# Patient Record
Sex: FEMALE | Race: WHITE | NOT HISPANIC OR LATINO | Employment: FULL TIME | URBAN - METROPOLITAN AREA
[De-identification: names, ages, dates, MRNs, and addresses within clinical notes are randomized per-mention and may not be internally consistent; named-entity substitution may affect disease eponyms.]

---

## 2017-01-05 RX ORDER — ERGOCALCIFEROL 1.25 MG/1
50000 CAPSULE ORAL
Qty: 4 CAPSULE | Refills: 2 | Status: SHIPPED | OUTPATIENT
Start: 2017-01-05 | End: 2017-06-11 | Stop reason: SDUPTHER

## 2017-01-10 ENCOUNTER — TELEPHONE (OUTPATIENT)
Dept: ENDOCRINOLOGY | Facility: CLINIC | Age: 47
End: 2017-01-10

## 2017-01-10 NOTE — TELEPHONE ENCOUNTER
----- Message from Rashmi Sheppard sent at 1/10/2017  9:22 AM EST -----  Contact: [facility]  THEY ARE CALLING TO CHECK ON THE STATUS OF A PRIOR AUTH FOR TOUJEO MEDICATION FOR THIS PATIENT. THEY FAXED THE ORIGINAL REQUEST FOR PRIOR AUTH ON 01/04/2017. I ASKED THEM IF THEY WOULD REFAX THAT JUST IN CASE WE DID NOT RECEIVE IT THE FIRST TIME. SHE WANTS US TO FAX THE PRIOR AUTH ONCE WE RECEIVE IT.

## 2017-01-10 NOTE — TELEPHONE ENCOUNTER
Called pharmacy informed them we haven't received the rejection from them asked them to refax the rejection so that PA can be started.

## 2017-01-18 ENCOUNTER — TELEPHONE (OUTPATIENT)
Dept: ENDOCRINOLOGY | Facility: CLINIC | Age: 47
End: 2017-01-18

## 2017-01-18 NOTE — TELEPHONE ENCOUNTER
----- Message from Mackenzie Elder sent at 1/18/2017  9:49 AM EST -----  Contact: [facility] in [place] called to check on PA status for toujeo  [phone]

## 2017-01-19 ENCOUNTER — TELEPHONE (OUTPATIENT)
Dept: ENDOCRINOLOGY | Facility: CLINIC | Age: 47
End: 2017-01-19

## 2017-01-19 DIAGNOSIS — E11.8 UNCONTROLLED TYPE 2 DIABETES MELLITUS WITH COMPLICATION, UNSPECIFIED LONG TERM INSULIN USE STATUS: Primary | ICD-10-CM

## 2017-01-19 DIAGNOSIS — E11.8 UNCONTROLLED TYPE 2 DIABETES MELLITUS WITH COMPLICATION, UNSPECIFIED LONG TERM INSULIN USE STATUS: ICD-10-CM

## 2017-01-19 DIAGNOSIS — E11.65 UNCONTROLLED TYPE 2 DIABETES MELLITUS WITH COMPLICATION, UNSPECIFIED LONG TERM INSULIN USE STATUS: ICD-10-CM

## 2017-01-19 DIAGNOSIS — E11.65 UNCONTROLLED TYPE 2 DIABETES MELLITUS WITH COMPLICATION, UNSPECIFIED LONG TERM INSULIN USE STATUS: Primary | ICD-10-CM

## 2017-01-19 NOTE — TELEPHONE ENCOUNTER
Returned pt's call and informed her that her Rx for Toujeo was switched to Basaglar per  considering that this was the only one that was covered under her insurance. Pt expressed understanding.

## 2017-01-19 NOTE — TELEPHONE ENCOUNTER
----- Message from Trish Rubio sent at 1/19/2017  9:05 AM EST -----  THE PATIENT WAS RETURNING YOUR CALL AND WOULD LIKE FOR YOU TO GIVE HER A CALL B K

## 2017-01-23 ENCOUNTER — TELEPHONE (OUTPATIENT)
Dept: ENDOCRINOLOGY | Facility: CLINIC | Age: 47
End: 2017-01-23

## 2017-01-23 DIAGNOSIS — E11.8 UNCONTROLLED TYPE 2 DIABETES MELLITUS WITH COMPLICATION, UNSPECIFIED LONG TERM INSULIN USE STATUS: ICD-10-CM

## 2017-01-23 DIAGNOSIS — E11.65 UNCONTROLLED TYPE 2 DIABETES MELLITUS WITH COMPLICATION, UNSPECIFIED LONG TERM INSULIN USE STATUS: ICD-10-CM

## 2017-01-23 NOTE — TELEPHONE ENCOUNTER
----- Message from Rashmi Sheppard sent at 1/23/2017  3:39 PM EST -----  Contact: PATIENT  PATIENT IS CALLING ABOUT HER RECENT REFILL ON THE BASAGLAR SCRIPT THAT HAS BEEN CALLED INTO HER PHARMACY. ITS BEING FILLED WRONG. SHE SAID SHE TAKES 50 UNITS AT NIGHT NOT 10 UNITS AT NIGHT. SHE WOULD LIKE FOR US TO FIX THIS ERROR ON THIS MEDICATION. PHARMACY [facility] IN [place] PHONE NUMBER [phone]. SHE NEEDS IT FOR 90 DAYS AS ITS CHEAPER THROUGH INSURANCE TO HAVE IT FILLED THIS WAY. YOU CAN REACH PATIENT [phone]

## 2017-02-17 DIAGNOSIS — E03.9 ACQUIRED HYPOTHYROIDISM: ICD-10-CM

## 2017-02-20 ENCOUNTER — TELEPHONE (OUTPATIENT)
Dept: ENDOCRINOLOGY | Facility: CLINIC | Age: 47
End: 2017-02-20

## 2017-02-20 RX ORDER — LEVOTHYROXINE SODIUM 300 UG/1
TABLET ORAL
Qty: 90 TABLET | Refills: 2 | Status: SHIPPED | OUTPATIENT
Start: 2017-02-20 | End: 2017-03-14 | Stop reason: ALTCHOICE

## 2017-02-20 NOTE — TELEPHONE ENCOUNTER
Please see PT request.         ---- Message from Maliha Dale sent at 2/20/2017 12:36 PM EST -----  Contact: PATIENT  PATIENT STATES HER FEET ARE HURTING A LOT AND SHE FEELS LIKE SHE NEEDS TO BE ON MEDICATION.SHE ISN'T SURE IF SHE SPOKE TO YOU ABOUT THIS AT HER LAST VISIT. SHE DOES SEE YOU IN 2 WEEKS BUT CANT WAIT TILL THEN.

## 2017-02-21 RX ORDER — GABAPENTIN 300 MG/1
300 CAPSULE ORAL 3 TIMES DAILY
Qty: 90 CAPSULE | Refills: 3 | Status: SHIPPED | OUTPATIENT
Start: 2017-02-21 | End: 2017-09-12 | Stop reason: SDUPTHER

## 2017-02-21 NOTE — TELEPHONE ENCOUNTER
Her symptoms sounds like diabetic neuropathy symptoms. We can start her on the medication and will assess jn sin 2 weeks at her visit, Please send or call in rx fro Neurontin 300 mg 3 times a day. (90 tabs in 3 refils) . she may start at twice a day for the first 2 weeks

## 2017-03-08 ENCOUNTER — OFFICE VISIT (OUTPATIENT)
Dept: ENDOCRINOLOGY | Facility: CLINIC | Age: 47
End: 2017-03-08

## 2017-03-08 VITALS
DIASTOLIC BLOOD PRESSURE: 60 MMHG | BODY MASS INDEX: 39.78 KG/M2 | WEIGHT: 233 LBS | HEIGHT: 64 IN | SYSTOLIC BLOOD PRESSURE: 118 MMHG

## 2017-03-08 DIAGNOSIS — E11.42 TYPE 2 DIABETES MELLITUS WITH DIABETIC POLYNEUROPATHY, WITH LONG-TERM CURRENT USE OF INSULIN (HCC): ICD-10-CM

## 2017-03-08 DIAGNOSIS — E11.8 UNCONTROLLED TYPE 2 DIABETES MELLITUS WITH COMPLICATION, UNSPECIFIED LONG TERM INSULIN USE STATUS: Primary | ICD-10-CM

## 2017-03-08 DIAGNOSIS — E11.65 UNCONTROLLED TYPE 2 DIABETES MELLITUS WITH COMPLICATION, UNSPECIFIED LONG TERM INSULIN USE STATUS: Primary | ICD-10-CM

## 2017-03-08 DIAGNOSIS — E03.9 ACQUIRED HYPOTHYROIDISM: ICD-10-CM

## 2017-03-08 DIAGNOSIS — Z79.4 TYPE 2 DIABETES MELLITUS WITH DIABETIC POLYNEUROPATHY, WITH LONG-TERM CURRENT USE OF INSULIN (HCC): ICD-10-CM

## 2017-03-08 DIAGNOSIS — E04.2 NONTOXIC MULTINODULAR GOITER: ICD-10-CM

## 2017-03-08 DIAGNOSIS — E55.9 VITAMIN D DEFICIENCY: ICD-10-CM

## 2017-03-08 LAB
ALBUMIN SERPL-MCNC: 4.4 G/DL (ref 3.2–4.8)
ALBUMIN/GLOB SERPL: 1.6 G/DL (ref 1.5–2.5)
ALP SERPL-CCNC: 52 U/L (ref 25–100)
ALT SERPL W P-5'-P-CCNC: 17 U/L (ref 7–40)
ANION GAP SERPL CALCULATED.3IONS-SCNC: 9 MMOL/L (ref 3–11)
AST SERPL-CCNC: 16 U/L (ref 0–33)
BILIRUB SERPL-MCNC: 0.3 MG/DL (ref 0.3–1.2)
BUN BLD-MCNC: 20 MG/DL (ref 9–23)
BUN/CREAT SERPL: 28.6 (ref 7–25)
CALCIUM SPEC-SCNC: 10 MG/DL (ref 8.7–10.4)
CHLORIDE SERPL-SCNC: 99 MMOL/L (ref 99–109)
CO2 SERPL-SCNC: 26 MMOL/L (ref 20–31)
CREAT BLD-MCNC: 0.7 MG/DL (ref 0.6–1.3)
GFR SERPL CREATININE-BSD FRML MDRD: 90 ML/MIN/1.73
GLOBULIN UR ELPH-MCNC: 2.8 GM/DL
GLUCOSE BLD-MCNC: 201 MG/DL (ref 70–100)
GLUCOSE BLDC GLUCOMTR-MCNC: 252 MG/DL (ref 70–130)
HBA1C MFR BLD: 10.5 %
POTASSIUM BLD-SCNC: 4.4 MMOL/L (ref 3.5–5.5)
PROT SERPL-MCNC: 7.2 G/DL (ref 5.7–8.2)
SODIUM BLD-SCNC: 134 MMOL/L (ref 132–146)
T4 FREE SERPL-MCNC: 1.93 NG/DL (ref 0.89–1.76)
TSH SERPL DL<=0.05 MIU/L-ACNC: 0.24 MIU/ML (ref 0.35–5.35)

## 2017-03-08 PROCEDURE — 80053 COMPREHEN METABOLIC PANEL: CPT | Performed by: INTERNAL MEDICINE

## 2017-03-08 PROCEDURE — 84439 ASSAY OF FREE THYROXINE: CPT | Performed by: INTERNAL MEDICINE

## 2017-03-08 PROCEDURE — 84443 ASSAY THYROID STIM HORMONE: CPT | Performed by: INTERNAL MEDICINE

## 2017-03-08 PROCEDURE — 83036 HEMOGLOBIN GLYCOSYLATED A1C: CPT | Performed by: INTERNAL MEDICINE

## 2017-03-08 PROCEDURE — 82947 ASSAY GLUCOSE BLOOD QUANT: CPT | Performed by: INTERNAL MEDICINE

## 2017-03-08 PROCEDURE — 99214 OFFICE O/P EST MOD 30 MIN: CPT | Performed by: INTERNAL MEDICINE

## 2017-03-08 RX ORDER — MONTELUKAST SODIUM 4 MG/1
1 TABLET, CHEWABLE ORAL 2 TIMES DAILY
Refills: 3 | COMMUNITY
Start: 2017-02-06 | End: 2019-01-29

## 2017-03-08 NOTE — PROGRESS NOTES
Chief complaint  Follow-up (Type 2 diabetes mellitus, hypothyroidism)    Subjective   Ann Galdamez is a 46 y.o. female is here today for follow-up.  Follow-up for DM type 2 dx 2012.  Complications: noticed burning on the top of the feet, tingling at night. Pain is severe and she called the office, I have started her on neurontin 300 mg TID 2 weeks ago.   She started medication and tingling improved, pain is still persistent. It interferes with her sleep and she feels tired. N oside effects observed.   I have started patient on basal insulin Lantus  50 units a day. Insulin titration instructions and insulin administration teaching was provided to the patient. She titrated up to 50 and stopped because felt that the dose is too high     Due for eye exam.   Medications:  metformin 500 mg ER daily. Victoza 1.8 mg Lantus    Past meds: Invokana in the past caused yeast infection and it was stopped.   Monitoring: glucose is 240- 300, more variability during    Hypothyroidism dx 2006.In July 2014 [place] levothyroxine combo was transitioned to levothyroxine, the dose increased to 350 mcg last visit. Doing well and compliant with the medication.        Diarrhea is resolved with cholestipol. Doing better with this. Had colonoscopy.     Thyroid nodules are stable.     She had abnormal PAPsmear precancerous lesions and scheduled for the follow-up visit. Scheduled for procedure.       Hypothyroidism   Associated symptoms include arthralgias and numbness (sensitive top of the feet bilaterally, constant pain worse at night. ). Pertinent negatives include no abdominal pain, chills, congestion, coughing, diaphoresis, joint swelling, myalgias, nausea, neck pain, sore throat or vomiting.       Medications    Current Outpatient Prescriptions:   •  Blood Glucose Monitoring Suppl (ONE TOUCH ULTRA MINI) W/DEVICE kit, Test 3 times a day, Disp: , Rfl:   •  colestipol (COLESTID) 1 G tablet, Take 1 tablet by mouth 2 (Two) Times a Day.,  Disp: , Rfl: 3  •  CRANBERRY PO, Take 1 capsule by mouth daily., Disp: , Rfl:   •  cyanocobalamin 1000 MCG/ML injection, Inject 1 mL under the skin every 28 (twenty-eight) days. Provide 3 vials - 90 days supply, Disp: 3 mL, Rfl: 3  •  gabapentin (NEURONTIN) 300 MG capsule, Take 1 capsule by mouth 3 (Three) Times a Day. TAKE 2 CAPSULES DAILY FOR THE FIRST TWO WEEKS, Disp: 90 capsule, Rfl: 3  •  glucose blood (ONE TOUCH ULTRA TEST) test strip, Test 3 times daily, Disp: , Rfl:   •  Insulin Glargine (BASAGLAR KWIKPEN) 100 UNIT/ML injection pen, Inject 50 Units under the skin Every Night., Disp: 50 mL, Rfl: 1  •  levothyroxine (SYNTHROID, LEVOTHROID) 300 MCG tablet, TAKE 1 TABLET EVERY DAY, Disp: 90 tablet, Rfl: 2  •  Liraglutide (VICTOZA) 18 MG/3ML solution pen-injector, Inject 1.8 mg under the skin Daily., Disp: 3 pen, Rfl: 5  •  medroxyPROGESTERone (PROVERA) 10 MG tablet, Take 10 mg by mouth daily., Disp: , Rfl:   •  metFORMIN XR (GLUCOPHAGE-XR) 500 MG 24 hr tablet, TAKE 1 TABLET EVERY DAY AS DIRECTED, Disp: 30 tablet, Rfl: 5  •  ONETOUCH DELICA LANCETS 33G misc, Test 3 times daily, Disp: , Rfl:   •  valsartan-hydrochlorothiazide (DIOVAN-HCT) 160-12.5 MG per tablet, TAKE 1 TABLET EVERY DAY, Disp: 90 tablet, Rfl: 1  •  vitamin D (ERGOCALCIFEROL) 05807 UNITS capsule capsule, Take 1 capsule by mouth Every 7 (Seven) Days., Disp: 4 capsule, Rfl: 2  •  Insulin Pen Needle (B-D UF III MINI PEN NEEDLES) 31G X 5 MM misc, 1 each 2 (Two) Times a Day., Disp: 60 each, Rfl: 5    PMH  The following portions of the patient's history were reviewed and updated as appropriate: allergies, current medications, past family history, past medical history, past social history, past surgical history and problem list.    Review of systems  Review of Systems   Constitutional: Positive for unexpected weight change (weight gain). Negative for activity change, appetite change, chills and diaphoresis.   HENT: Negative for congestion, ear pain, facial  "swelling, hearing loss, postnasal drip, sore throat, trouble swallowing and voice change.    Eyes: Negative.  Negative for redness, itching and visual disturbance.   Respiratory: Negative for cough and shortness of breath.    Cardiovascular: Negative for palpitations and leg swelling.   Gastrointestinal: Positive for diarrhea. Negative for abdominal distention, abdominal pain, constipation, nausea and vomiting.   Endocrine:        As listed in HPI   Genitourinary: Negative.    Musculoskeletal: Positive for arthralgias. Negative for back pain, joint swelling, myalgias, neck pain and neck stiffness.   Skin: Negative.    Allergic/Immunologic: Negative.    Neurological: Positive for numbness (sensitive top of the feet bilaterally, constant pain worse at night. ). Negative for syncope and light-headedness.   Hematological: Negative.    Psychiatric/Behavioral: Negative.    All other systems reviewed and are negative.      Physical exam  Objective   Blood pressure 118/60, height 64\" (162.6 cm), weight 233 lb (106 kg).   Physical Exam   Constitutional: She is oriented to person, place, and time. She appears well-developed and well-nourished.   HENT:   Head: Normocephalic and atraumatic.   Eyes: Conjunctivae are normal.   Neck: No thyromegaly present.   The neck is supple and symmetric   Cardiovascular: Normal rate, regular rhythm and normal heart sounds.    Pulmonary/Chest: Effort normal and breath sounds normal.   Musculoskeletal: She exhibits no edema.   Lymphadenopathy:     She has no cervical adenopathy.   Neurological: She is alert and oriented to person, place, and time.   Skin: Skin is warm and dry. No rash noted.   Psychiatric: She has a normal mood and affect. Thought content normal.   Vitals reviewed.          Results for orders placed or performed in visit on 03/08/17   POC Glucose Fingerstick   Result Value Ref Range    Glucose 252 (A) 70 - 130 mg/dL   POC Glycosylated Hemoglobin (Hb A1C)   Result Value Ref " Range    Hemoglobin A1C 10.5 %     Lab Results   Component Value Date    HGBA1C 10.5 03/08/2017    HGBA1C 10.9 11/04/2016    HGBA1C 10.60 (H) 08/23/2016     Lab Results   Component Value Date    CREATININE 0.80 11/04/2016         Assessment  Assessment/Plan   Problem List Items Addressed This Visit        Digestive    Vitamin D deficiency       Endocrine    Hypothyroidism    Relevant Orders    TSH    T4, Free    Nontoxic multinodular goiter    Type 2 diabetes mellitus, uncontrolled - Primary    Relevant Orders    POC Glucose Fingerstick (Completed)    POC Glycosylated Hemoglobin (Hb A1C) (Completed)    Comprehensive Metabolic Panel       Other    Diabetes mellitus with diabetic polyneuropathy, with long-term current use of insulin          Plan  Levothyroxine  300 mcg/day. Repeat labs today      cont metformin and Victoza  1.8 mg daily, explained that no association with cervical cancer or precancer.    - neurontin 300 mg ER given for BID dosing and  incresae to 300 TID if not effective      Goals of glucose reviewed. Diabetes diet and complication prevention reviewed in details.     Thyroid u/s 4/23/15 showed stable nodules. Monitor every 2 years, due in 2017. Next visit.     Follow-up in 2-3 months

## 2017-03-14 ENCOUNTER — TELEPHONE (OUTPATIENT)
Dept: ENDOCRINOLOGY | Facility: CLINIC | Age: 47
End: 2017-03-14

## 2017-03-14 RX ORDER — LEVOTHYROXINE SODIUM 0.2 MG/1
200 TABLET ORAL DAILY
Qty: 30 TABLET | Refills: 5 | Status: SHIPPED | OUTPATIENT
Start: 2017-03-14 | End: 2017-08-17 | Stop reason: SDUPTHER

## 2017-03-14 RX ORDER — LEVOTHYROXINE SODIUM 0.07 MG/1
75 TABLET ORAL DAILY
Qty: 30 TABLET | Refills: 5 | Status: SHIPPED | OUTPATIENT
Start: 2017-03-14 | End: 2017-08-17 | Stop reason: SDUPTHER

## 2017-03-14 NOTE — TELEPHONE ENCOUNTER
Pt informed of results and expressed understanding,She stated that she was currently taking Levothyroxine 300 mcg so per , Rx was sent to pharmacy for Levothyroxine 200 mcg and 75 mcg.

## 2017-03-14 NOTE — TELEPHONE ENCOUNTER
----- Message from Bree GARCIA MD sent at 3/13/2017  9:41 PM EDT -----  Please call the patient regarding her lab results. THyroid function is too high and we need to reduce the dose. Please ask what dose is she currently taking - 300 mcg? If it si correct, then reduce the dose to 275 mcg daily - send rx for 200 and 75 mcg levothyroxine to take together daily. Other labs are normal, including electrolytes and renal function, liver enzymes.

## 2017-03-31 RX ORDER — LIRAGLUTIDE 6 MG/ML
INJECTION SUBCUTANEOUS
Qty: 2 PEN | Refills: 4 | Status: SHIPPED | OUTPATIENT
Start: 2017-03-31 | End: 2018-03-26 | Stop reason: SDUPTHER

## 2017-04-10 RX ORDER — METFORMIN HYDROCHLORIDE 500 MG/1
500 TABLET, EXTENDED RELEASE ORAL DAILY
Qty: 90 TABLET | Refills: 0 | Status: SHIPPED | OUTPATIENT
Start: 2017-04-10 | End: 2017-04-26 | Stop reason: SDUPTHER

## 2017-04-21 DIAGNOSIS — E11.65 UNCONTROLLED TYPE 2 DIABETES MELLITUS WITH COMPLICATION, UNSPECIFIED LONG TERM INSULIN USE STATUS: ICD-10-CM

## 2017-04-21 DIAGNOSIS — E11.8 UNCONTROLLED TYPE 2 DIABETES MELLITUS WITH COMPLICATION, UNSPECIFIED LONG TERM INSULIN USE STATUS: ICD-10-CM

## 2017-04-27 RX ORDER — METFORMIN HYDROCHLORIDE 500 MG/1
500 TABLET, EXTENDED RELEASE ORAL DAILY
Qty: 90 TABLET | Refills: 0 | Status: SHIPPED | OUTPATIENT
Start: 2017-04-27 | End: 2017-08-17 | Stop reason: SDUPTHER

## 2017-05-15 RX ORDER — VALSARTAN AND HYDROCHLOROTHIAZIDE 160; 12.5 MG/1; MG/1
TABLET, FILM COATED ORAL
Qty: 90 TABLET | Refills: 1 | Status: SHIPPED | OUTPATIENT
Start: 2017-05-15 | End: 2017-11-10 | Stop reason: SDUPTHER

## 2017-06-08 ENCOUNTER — OFFICE VISIT (OUTPATIENT)
Dept: ENDOCRINOLOGY | Facility: CLINIC | Age: 47
End: 2017-06-08

## 2017-06-08 VITALS
OXYGEN SATURATION: 98 % | SYSTOLIC BLOOD PRESSURE: 136 MMHG | DIASTOLIC BLOOD PRESSURE: 78 MMHG | HEIGHT: 64 IN | BODY MASS INDEX: 40.26 KG/M2 | HEART RATE: 80 BPM | WEIGHT: 235.8 LBS

## 2017-06-08 DIAGNOSIS — E04.2 NONTOXIC MULTINODULAR GOITER: ICD-10-CM

## 2017-06-08 DIAGNOSIS — E11.65 UNCONTROLLED TYPE 2 DIABETES MELLITUS WITH COMPLICATION, UNSPECIFIED LONG TERM INSULIN USE STATUS: Primary | ICD-10-CM

## 2017-06-08 DIAGNOSIS — E03.9 ACQUIRED HYPOTHYROIDISM: ICD-10-CM

## 2017-06-08 DIAGNOSIS — E11.8 UNCONTROLLED TYPE 2 DIABETES MELLITUS WITH COMPLICATION, UNSPECIFIED LONG TERM INSULIN USE STATUS: Primary | ICD-10-CM

## 2017-06-08 LAB
ALBUMIN SERPL-MCNC: 4.6 G/DL (ref 3.2–4.8)
ALBUMIN/GLOB SERPL: 1.5 G/DL (ref 1.5–2.5)
ALP SERPL-CCNC: 46 U/L (ref 25–100)
ALT SERPL W P-5'-P-CCNC: 22 U/L (ref 7–40)
ANION GAP SERPL CALCULATED.3IONS-SCNC: 5 MMOL/L (ref 3–11)
ARTICHOKE IGE QN: 83 MG/DL (ref 0–130)
AST SERPL-CCNC: 17 U/L (ref 0–33)
BILIRUB SERPL-MCNC: 0.4 MG/DL (ref 0.3–1.2)
BUN BLD-MCNC: 17 MG/DL (ref 9–23)
BUN/CREAT SERPL: 24.3 (ref 7–25)
CALCIUM SPEC-SCNC: 10.3 MG/DL (ref 8.7–10.4)
CHLORIDE SERPL-SCNC: 103 MMOL/L (ref 99–109)
CHOLEST SERPL-MCNC: 172 MG/DL (ref 0–200)
CO2 SERPL-SCNC: 29 MMOL/L (ref 20–31)
CREAT BLD-MCNC: 0.7 MG/DL (ref 0.6–1.3)
GFR SERPL CREATININE-BSD FRML MDRD: 90 ML/MIN/1.73
GLOBULIN UR ELPH-MCNC: 3 GM/DL
GLUCOSE BLD-MCNC: 156 MG/DL (ref 70–100)
GLUCOSE BLDC GLUCOMTR-MCNC: 198 MG/DL (ref 70–130)
HBA1C MFR BLD: 9.7 %
HDLC SERPL-MCNC: 43 MG/DL (ref 40–60)
POTASSIUM BLD-SCNC: 4.4 MMOL/L (ref 3.5–5.5)
PROT SERPL-MCNC: 7.6 G/DL (ref 5.7–8.2)
SODIUM BLD-SCNC: 137 MMOL/L (ref 132–146)
T4 FREE SERPL-MCNC: 1.45 NG/DL (ref 0.89–1.76)
TRIGL SERPL-MCNC: 413 MG/DL (ref 0–150)
TSH SERPL DL<=0.05 MIU/L-ACNC: 0.84 MIU/ML (ref 0.35–5.35)

## 2017-06-08 PROCEDURE — 82570 ASSAY OF URINE CREATININE: CPT | Performed by: INTERNAL MEDICINE

## 2017-06-08 PROCEDURE — 84443 ASSAY THYROID STIM HORMONE: CPT | Performed by: INTERNAL MEDICINE

## 2017-06-08 PROCEDURE — 82947 ASSAY GLUCOSE BLOOD QUANT: CPT | Performed by: INTERNAL MEDICINE

## 2017-06-08 PROCEDURE — 99214 OFFICE O/P EST MOD 30 MIN: CPT | Performed by: INTERNAL MEDICINE

## 2017-06-08 PROCEDURE — 82043 UR ALBUMIN QUANTITATIVE: CPT | Performed by: INTERNAL MEDICINE

## 2017-06-08 PROCEDURE — 80053 COMPREHEN METABOLIC PANEL: CPT | Performed by: INTERNAL MEDICINE

## 2017-06-08 PROCEDURE — 84439 ASSAY OF FREE THYROXINE: CPT | Performed by: INTERNAL MEDICINE

## 2017-06-08 PROCEDURE — 76536 US EXAM OF HEAD AND NECK: CPT | Performed by: INTERNAL MEDICINE

## 2017-06-08 PROCEDURE — 80061 LIPID PANEL: CPT | Performed by: INTERNAL MEDICINE

## 2017-06-08 PROCEDURE — 83036 HEMOGLOBIN GLYCOSYLATED A1C: CPT | Performed by: INTERNAL MEDICINE

## 2017-06-08 NOTE — PATIENT INSTRUCTIONS
1. Continue Victoza 1.8 mg a day  Take Tresiba instead of Toujeo 100 units daily       2. Start injection with meal insulin  (Apidra) 10 units with largest meal (dinner).       3. Test glucose in the morning and at bedtime. If your bedtime, after meal glucose is > 200 in 1 week, increase Apidra to 15 Units

## 2017-06-08 NOTE — PROGRESS NOTES
Chief complaint  Hypothyroidism (F/u for type 2 diabetes and hypothyroidism, no sx or concerns. ) and Diabetes    Subjective   Ann Galdamez is a 46 y.o. female is here today for follow-up.  Follow-up for DM type 2 dx 2012.  Complications: noticed burning on the top of the feet, tingling at night.  on neurontin 300 mg at bedtime is effective for the symptoms.      Due for eye exam.   NUtrition - largest meal is dinner.   Medications:  metformin 500 mg ER daily. Victoza 1.8 mg Lantus.    Past meds: Invokana in the past caused yeast infection and it was stopped.   Monitoring: glucose is 240- 300, more variability during    Hypothyroidism dx 2006. In July 2014 [place] levothyroxine combo was transitioned to levothyroxine, the dose increased to 350 mcg last visit. Doing well and compliant with the medication.        Diarrhea is resolved with cholestipol. Doing better with this. Had colonoscopy.     Thyroid nodules were stable stable, last u/s in 2015    She had abnormal PAPsmear precancerous lesions and scheduled for the follow-up biopsy.        Hypothyroidism   Associated symptoms include arthralgias and numbness (sensitive top of the feet bilaterally, constant pain worse at night. ). Pertinent negatives include no abdominal pain, chills, congestion, coughing, diaphoresis, joint swelling, myalgias, nausea, neck pain, sore throat or vomiting.   Diabetes         Medications    Current Outpatient Prescriptions:   •  Blood Glucose Monitoring Suppl (ONE TOUCH ULTRA MINI) W/DEVICE kit, Test 3 times a day, Disp: , Rfl:   •  colestipol (COLESTID) 1 G tablet, Take 1 tablet by mouth 2 (Two) Times a Day., Disp: , Rfl: 3  •  CRANBERRY PO, Take 1 capsule by mouth daily., Disp: , Rfl:   •  cyanocobalamin 1000 MCG/ML injection, Inject 1 mL under the skin every 28 (twenty-eight) days. Provide 3 vials - 90 days supply, Disp: 3 mL, Rfl: 3  •  gabapentin (NEURONTIN) 300 MG capsule, Take 1 capsule by mouth 3 (Three) Times a Day. TAKE  2 CAPSULES DAILY FOR THE FIRST TWO WEEKS, Disp: 90 capsule, Rfl: 3  •  glucose blood (ONE TOUCH ULTRA TEST) test strip, Test 3 times daily, Disp: , Rfl:   •  Insulin Degludec 200 UNIT/ML solution pen-injector, Inject 100 Units under the skin Every Night., Disp: 5 pen, Rfl: 6  •  Insulin Glargine (Insulin Glargine) 100 UNIT/ML injection pen, Inject 50 Units under the skin Every Night., Disp: 50 mL, Rfl: 1  •  Insulin Glulisine 100 UNIT/ML solution pen-injector, Inject 10 Units under the skin Daily., Disp: 5 pen, Rfl: 3  •  Insulin Pen Needle (B-D UF III MINI PEN NEEDLES) 31G X 5 MM misc, 1 each 2 (Two) Times a Day., Disp: 60 each, Rfl: 5  •  levothyroxine (SYNTHROID) 200 MCG tablet, Take 1 tablet by mouth Daily., Disp: 30 tablet, Rfl: 5  •  levothyroxine (SYNTHROID) 75 MCG tablet, Take 1 tablet by mouth Daily., Disp: 30 tablet, Rfl: 5  •  Liraglutide (VICTOZA) 18 MG/3ML solution pen-injector, Inject 1.8 mg under the skin Daily., Disp: 3 pen, Rfl: 5  •  medroxyPROGESTERone (PROVERA) 10 MG tablet, Take 10 mg by mouth daily., Disp: , Rfl:   •  metFORMIN ER (GLUCOPHAGE-XR) 500 MG 24 hr tablet, Take 1 tablet by mouth Daily., Disp: 90 tablet, Rfl: 0  •  ONETOUCH DELICA LANCETS 33G misc, Test 3 times daily, Disp: , Rfl:   •  valsartan-hydrochlorothiazide (DIOVAN-HCT) 160-12.5 MG per tablet, TAKE 1 TABLET EVERY DAY, Disp: 90 tablet, Rfl: 1  •  VICTOZA 18 MG/3ML solution pen-injector, INJECT 1.2 MG UNDER THE SKIN ONCE A DAY WITH BREAKFAST, Disp: 2 pen, Rfl: 4  •  vitamin D (ERGOCALCIFEROL) 00947 UNITS capsule capsule, Take 1 capsule by mouth Every 7 (Seven) Days., Disp: 4 capsule, Rfl: 2    PMH  The following portions of the patient's history were reviewed and updated as appropriate: allergies, current medications, past family history, past medical history, past social history, past surgical history and problem list.    Review of systems  Review of Systems   Constitutional: Positive for unexpected weight change (weight gain).  "Negative for activity change, appetite change, chills and diaphoresis.   HENT: Negative for congestion, ear pain, facial swelling, hearing loss, postnasal drip, sore throat, trouble swallowing and voice change.    Eyes: Negative.  Negative for redness, itching and visual disturbance.   Respiratory: Negative for cough and shortness of breath.    Cardiovascular: Negative for palpitations and leg swelling.   Gastrointestinal: Positive for diarrhea. Negative for abdominal distention, abdominal pain, constipation, nausea and vomiting.   Endocrine:        As listed in HPI   Genitourinary: Negative.    Musculoskeletal: Positive for arthralgias. Negative for back pain, joint swelling, myalgias, neck pain and neck stiffness.   Skin: Negative.    Allergic/Immunologic: Negative.    Neurological: Positive for numbness (sensitive top of the feet bilaterally, constant pain worse at night. ). Negative for syncope and light-headedness.   Hematological: Negative.    Psychiatric/Behavioral: Negative.    All other systems reviewed and are negative.      Physical exam  Objective   Blood pressure 136/78, pulse 80, height 64\" (162.6 cm), weight 235 lb 12.8 oz (107 kg), SpO2 98 %.   Physical Exam   Constitutional: She is oriented to person, place, and time. She appears well-developed and well-nourished.   HENT:   Head: Normocephalic and atraumatic.   Eyes: Conjunctivae are normal.   Neck: No JVD present. No thyromegaly present.   The neck is supple and symmetric   Cardiovascular: Normal rate, regular rhythm and normal heart sounds.    Pulmonary/Chest: Effort normal and breath sounds normal.   Musculoskeletal: Normal range of motion. She exhibits no edema.    Ann had a diabetic foot exam performed (toes are red, capillary refill is normal. ) today.   During the foot exam she had a monofilament test performed.    Neurological Sensory Findings -  Altered sharp/dull right ankle/foot discrimination (toes decreased sensation to " microfilament ) and altered sharp/dull left ankle/foot discrimination. No right ankle/foot altered proprioception and no left ankle/foot altered proprioception    Vascular Status -  Her exam exhibits right foot vasculature normal. Her exam exhibits no right foot edema. Her exam exhibits left foot vasculature normal. Her exam exhibits no left foot edema.   Skin Integrity  -  Her right foot skin is intact.     Ann 's left foot skin is intact. .  Lymphadenopathy:     She has no cervical adenopathy.   Neurological: She is alert and oriented to person, place, and time. She has normal reflexes.   Skin: Skin is warm and dry. No rash noted.   Psychiatric: She has a normal mood and affect. Thought content normal.   Vitals reviewed.          Results for orders placed or performed in visit on 06/08/17   POC Glucose Fingerstick   Result Value Ref Range    Glucose 198 (A) 70 - 130 mg/dL   POC Glycosylated Hemoglobin (Hb A1C)   Result Value Ref Range    Hemoglobin A1C 9.7 %     Lab Results   Component Value Date    HGBA1C 9.7 06/08/2017    HGBA1C 10.5 03/08/2017    HGBA1C 10.9 11/04/2016     Lab Results   Component Value Date    CREATININE 0.70 03/08/2017     Thyroid ultrasound      Real time high resolution imaging of the thyroid gland was performed in transverse and longitudinal planes.      The right lobe measured 2.82 cm L x 0.92 cm AP x 1.43 cm in TV dimension.    The isthmus measured 0.26 cm in thickness.    The left thyroid lobe measured 2.47 cm L x 1.48 cm AP x 1.18 cm in TV dimension.    Thyroid gland is small, hypoechoic and contains single nodule.    Nodule 1   is located in the right lobe and measures 0.9 x 0.45 x 0.58 cm (L X AP X TV).  This nodule is solid, homogeneous, hypoechoic with irregular margins, and Grade II vascularity on Color Flow Doppler.  It has no artifacts    No pathologic lymph nodes were seen.      Assessment: Stable decreased in size nodule.   Follow-up ultrasound in 24  months      Assessment  Assessment/Plan   Problem List Items Addressed This Visit        Endocrine    Hypothyroidism    Nontoxic multinodular goiter    Relevant Orders    TSH    T4, Free    Type 2 diabetes mellitus, uncontrolled - Primary    Relevant Orders    POC Glucose Fingerstick (Completed)    POC Glycosylated Hemoglobin (Hb A1C) (Completed)    US Thyroid (Completed)    Comprehensive Metabolic Panel    Lipid Panel    Microalbumin / Creatinine Urine Ratio          Plan  Levothyroxine  275 mcg/day. Repeat labs today      -cont metformin and Victoza  1.8 mg daily  -Lantus 100 units at night. Samples of tresiba given/   Glycemic control is inadequate and I have added prandial insulin Apidra 10 units with largest meal. Increase to 15 units if glucose postprandially is still high      Goals of glucose reviewed. Diabetes diet and complication prevention reviewed in details. FMLA completed for the appointment and allowing to not wear steel toe shoes and prolonged standing due to neuropathy     Thyroid u/s 6/8/2017 showed stable nodules. Monitor every 2 years, due in 2019.     Follow-up in 2-3 months

## 2017-06-10 LAB
CREAT 24H UR-MCNC: 52.7 MG/DL
MICROALB/CRT. RATIO UR: 53.7 MG/G CREAT (ref 0–30)
MICROALBUMIN UR-MCNC: 28.3 UG/ML

## 2017-06-12 RX ORDER — ERGOCALCIFEROL 1.25 MG/1
CAPSULE ORAL
Qty: 4 CAPSULE | Refills: 1 | Status: SHIPPED | OUTPATIENT
Start: 2017-06-12 | End: 2017-08-17 | Stop reason: SDUPTHER

## 2017-06-27 ENCOUNTER — TELEPHONE (OUTPATIENT)
Dept: INTERNAL MEDICINE | Facility: CLINIC | Age: 47
End: 2017-06-27

## 2017-06-27 NOTE — TELEPHONE ENCOUNTER
LEW,    MS YIER CALLED TO REPORT THAT SHE HAS BEEN TOLD THAT THE [facility] PAPERWORK THAT WAS FILLED OUT FOR HER IS NOT COMPLETE. SHE WILL NEED THIS BEFORE Thursday.     TO FAX BACK #[phone]

## 2017-07-06 ENCOUNTER — TELEPHONE (OUTPATIENT)
Dept: INTERNAL MEDICINE | Facility: CLINIC | Age: 47
End: 2017-07-06

## 2017-08-14 RX ORDER — GABAPENTIN 300 MG/1
CAPSULE ORAL
Qty: 90 CAPSULE | Refills: 1 | OUTPATIENT
Start: 2017-08-14

## 2017-08-17 RX ORDER — METFORMIN HYDROCHLORIDE 500 MG/1
500 TABLET, EXTENDED RELEASE ORAL DAILY
Qty: 90 TABLET | Refills: 0 | Status: SHIPPED | OUTPATIENT
Start: 2017-08-17 | End: 2017-09-22 | Stop reason: SDUPTHER

## 2017-08-17 RX ORDER — LEVOTHYROXINE SODIUM 0.07 MG/1
75 TABLET ORAL DAILY
Qty: 90 TABLET | Refills: 0 | Status: SHIPPED | OUTPATIENT
Start: 2017-08-17 | End: 2017-09-22 | Stop reason: SDUPTHER

## 2017-08-17 RX ORDER — LEVOTHYROXINE SODIUM 0.2 MG/1
200 TABLET ORAL DAILY
Qty: 90 TABLET | Refills: 0 | Status: SHIPPED | OUTPATIENT
Start: 2017-08-17 | End: 2017-08-26 | Stop reason: SDUPTHER

## 2017-08-17 RX ORDER — ERGOCALCIFEROL 1.25 MG/1
50000 CAPSULE ORAL
Qty: 12 CAPSULE | Refills: 0 | Status: SHIPPED | OUTPATIENT
Start: 2017-08-17 | End: 2017-09-22 | Stop reason: SDUPTHER

## 2017-08-26 RX ORDER — LEVOTHYROXINE SODIUM 0.2 MG/1
TABLET ORAL
Qty: 30 TABLET | Refills: 2 | Status: SHIPPED | OUTPATIENT
Start: 2017-08-26 | End: 2017-09-22 | Stop reason: SDUPTHER

## 2017-09-13 RX ORDER — GABAPENTIN 300 MG/1
300 CAPSULE ORAL 3 TIMES DAILY
Qty: 90 CAPSULE | Refills: 3 | Status: SHIPPED | OUTPATIENT
Start: 2017-09-13 | End: 2017-09-22 | Stop reason: SDUPTHER

## 2017-09-13 NOTE — TELEPHONE ENCOUNTER
She has appointment 09/22 if she wants to wait until then. Or we need to print it out and have her  the script, sign papers , baiely etc

## 2017-09-13 NOTE — TELEPHONE ENCOUNTER
Spoke with pt, pt has one day left of med and doesn't mind stopping by to sign contract and  script, pt wants a call once script is read for .

## 2017-09-18 NOTE — TELEPHONE ENCOUNTER
Rx was printed and is at the  with the contract. Called pt, no answer. Left a message requesting for her to return my call.

## 2017-09-22 ENCOUNTER — OFFICE VISIT (OUTPATIENT)
Dept: ENDOCRINOLOGY | Facility: CLINIC | Age: 47
End: 2017-09-22

## 2017-09-22 VITALS
SYSTOLIC BLOOD PRESSURE: 128 MMHG | BODY MASS INDEX: 40.67 KG/M2 | OXYGEN SATURATION: 98 % | HEIGHT: 64 IN | WEIGHT: 238.2 LBS | DIASTOLIC BLOOD PRESSURE: 80 MMHG | HEART RATE: 84 BPM

## 2017-09-22 DIAGNOSIS — E04.2 NONTOXIC MULTINODULAR GOITER: ICD-10-CM

## 2017-09-22 DIAGNOSIS — E11.8 UNCONTROLLED TYPE 2 DIABETES MELLITUS WITH COMPLICATION, UNSPECIFIED LONG TERM INSULIN USE STATUS: Primary | ICD-10-CM

## 2017-09-22 DIAGNOSIS — Z79.4 TYPE 2 DIABETES MELLITUS WITH DIABETIC POLYNEUROPATHY, WITH LONG-TERM CURRENT USE OF INSULIN (HCC): ICD-10-CM

## 2017-09-22 DIAGNOSIS — E03.9 ACQUIRED HYPOTHYROIDISM: ICD-10-CM

## 2017-09-22 DIAGNOSIS — E11.42 TYPE 2 DIABETES MELLITUS WITH DIABETIC POLYNEUROPATHY, WITH LONG-TERM CURRENT USE OF INSULIN (HCC): ICD-10-CM

## 2017-09-22 DIAGNOSIS — E11.65 UNCONTROLLED TYPE 2 DIABETES MELLITUS WITH COMPLICATION, UNSPECIFIED LONG TERM INSULIN USE STATUS: Primary | ICD-10-CM

## 2017-09-22 LAB
GLUCOSE BLDC GLUCOMTR-MCNC: 185 MG/DL (ref 70–130)
HBA1C MFR BLD: 8.8 %

## 2017-09-22 PROCEDURE — 82947 ASSAY GLUCOSE BLOOD QUANT: CPT | Performed by: INTERNAL MEDICINE

## 2017-09-22 PROCEDURE — 83036 HEMOGLOBIN GLYCOSYLATED A1C: CPT | Performed by: INTERNAL MEDICINE

## 2017-09-22 PROCEDURE — 90686 IIV4 VACC NO PRSV 0.5 ML IM: CPT | Performed by: INTERNAL MEDICINE

## 2017-09-22 PROCEDURE — 99214 OFFICE O/P EST MOD 30 MIN: CPT | Performed by: INTERNAL MEDICINE

## 2017-09-22 PROCEDURE — 90471 IMMUNIZATION ADMIN: CPT | Performed by: INTERNAL MEDICINE

## 2017-09-22 RX ORDER — LEVOTHYROXINE SODIUM 0.2 MG/1
200 TABLET ORAL DAILY
Qty: 90 TABLET | Refills: 1 | Status: SHIPPED | OUTPATIENT
Start: 2017-09-22 | End: 2018-03-30 | Stop reason: SDUPTHER

## 2017-09-22 RX ORDER — CYANOCOBALAMIN 1000 UG/ML
1000 INJECTION, SOLUTION INTRAMUSCULAR; SUBCUTANEOUS
Qty: 3 ML | Refills: 1 | Status: SHIPPED | OUTPATIENT
Start: 2017-09-22 | End: 2018-04-13 | Stop reason: SDUPTHER

## 2017-09-22 RX ORDER — METFORMIN HYDROCHLORIDE 500 MG/1
500 TABLET, EXTENDED RELEASE ORAL DAILY
Qty: 90 TABLET | Refills: 1 | Status: SHIPPED | OUTPATIENT
Start: 2017-09-22 | End: 2017-11-05 | Stop reason: SDUPTHER

## 2017-09-22 RX ORDER — LEVOTHYROXINE SODIUM 0.07 MG/1
75 TABLET ORAL DAILY
Qty: 90 TABLET | Refills: 0 | Status: SHIPPED | OUTPATIENT
Start: 2017-09-22 | End: 2017-09-23 | Stop reason: SDUPTHER

## 2017-09-22 RX ORDER — GABAPENTIN 300 MG/1
300 CAPSULE ORAL 3 TIMES DAILY
Qty: 90 CAPSULE | Refills: 3 | Status: SHIPPED | OUTPATIENT
Start: 2017-09-22 | End: 2018-03-19 | Stop reason: SDUPTHER

## 2017-09-22 RX ORDER — ERGOCALCIFEROL 1.25 MG/1
50000 CAPSULE ORAL
Qty: 12 CAPSULE | Refills: 1 | Status: SHIPPED | OUTPATIENT
Start: 2017-09-22 | End: 2018-03-30 | Stop reason: SDUPTHER

## 2017-09-22 NOTE — PROGRESS NOTES
Chief complaint  Diabetes (F/u for type 2 diabetes, testing 1-2x qd)    Subjective   Ann Galdamez is a 47 y.o. female is here today for follow-up.  Follow-up for DM type 2 dx 2012.  Complications: noticed burning on the top of the feet, tingling at night.  on neurontin 300 mg at bedtime is effective for the symptoms.      Due for eye exam.   NUtrition - largest meal is dinner.   Medications:  metformin 500 mg ER daily. Victoza 1.8 mg Lantus wa schanged to Tresiba. Apidra started 3/2017    Past meds: Invokana in the past caused yeast infection and it was stopped.   Monitoring: glucose is 200 in the morning. , more variability during    Hypothyroidism dx 2006. In July 2014 [place] levothyroxine combo was transitioned to levothyroxine, 275 mcg last visit. Doing well and compliant with the medication.   Last TSH was normal       Thyroid nodules were stable stable, last u/s in 2015    Patient is doing better, checks her glucose and administers insulin. She feels that Apidra is working well. Lowering the numbers, glucose is in 200 now, not 300.     Diabetes     Hypothyroidism   Associated symptoms include arthralgias and numbness (sensitive top of the feet bilaterally, constant pain worse at night. ). Pertinent negatives include no abdominal pain, chills, congestion, coughing, diaphoresis, joint swelling, myalgias, nausea, neck pain, sore throat or vomiting.       Medications    Current Outpatient Prescriptions:   •  Blood Glucose Monitoring Suppl (ONE TOUCH ULTRA MINI) W/DEVICE kit, Test 3 times a day, Disp: , Rfl:   •  colestipol (COLESTID) 1 G tablet, Take 1 tablet by mouth 2 (Two) Times a Day., Disp: , Rfl: 3  •  CRANBERRY PO, Take 1 capsule by mouth daily., Disp: , Rfl:   •  cyanocobalamin 1000 MCG/ML injection, Inject 1 mL under the skin Every 28 (Twenty-Eight) Days. Provide 3 vials - 90 days supply, Disp: 3 mL, Rfl: 1  •  gabapentin (NEURONTIN) 300 MG capsule, Take 1 capsule by mouth 3 (Three) Times a Day.,  Disp: 90 capsule, Rfl: 3  •  glucose blood (ONE TOUCH ULTRA TEST) test strip, To test blood glucose level 3 times per day., Disp: 300 each, Rfl: 1  •  Insulin Degludec 200 UNIT/ML solution pen-injector, Inject 100 Units under the skin Every Night., Disp: 45 mL, Rfl: 1  •  Insulin Glulisine (APIDRA SOLOSTAR) 100 UNIT/ML solution pen-injector, Inject 15 Units under the skin Daily., Disp: 45 mL, Rfl: 1  •  Insulin Glulisine 100 UNIT/ML solution pen-injector, Inject 10 Units under the skin Daily., Disp: 5 pen, Rfl: 3  •  Insulin Pen Needle (B-D UF III MINI PEN NEEDLES) 31G X 5 MM misc, 1 each 2 (Two) Times a Day., Disp: 60 each, Rfl: 5  •  levothyroxine (SYNTHROID) 75 MCG tablet, Take 1 tablet by mouth Daily., Disp: 90 tablet, Rfl: 0  •  levothyroxine (SYNTHROID, LEVOTHROID) 200 MCG tablet, Take 1 tablet by mouth Daily., Disp: 90 tablet, Rfl: 1  •  Liraglutide (VICTOZA) 18 MG/3ML solution pen-injector, Inject 1.8 mg under the skin Daily., Disp: 9 pen, Rfl: 0  •  medroxyPROGESTERone (PROVERA) 10 MG tablet, Take 10 mg by mouth daily., Disp: , Rfl:   •  metFORMIN ER (GLUCOPHAGE-XR) 500 MG 24 hr tablet, Take 1 tablet by mouth Daily., Disp: 90 tablet, Rfl: 1  •  ONETOUCH DELICA LANCETS 33G misc, Test 3 times daily, Disp: , Rfl:   •  valsartan-hydrochlorothiazide (DIOVAN-HCT) 160-12.5 MG per tablet, TAKE 1 TABLET EVERY DAY, Disp: 90 tablet, Rfl: 1  •  VICTOZA 18 MG/3ML solution pen-injector, INJECT 1.2 MG UNDER THE SKIN ONCE A DAY WITH BREAKFAST, Disp: 2 pen, Rfl: 4  •  vitamin D (ERGOCALCIFEROL) 65877 units capsule capsule, Take 1 capsule by mouth Every 7 (Seven) Days., Disp: 12 capsule, Rfl: 1    PMH  The following portions of the patient's history were reviewed and updated as appropriate: allergies, current medications, past family history, past medical history, past social history, past surgical history and problem list.    Review of systems  Review of Systems   Constitutional: Positive for unexpected weight change  "(weight gain). Negative for activity change, appetite change, chills and diaphoresis.   HENT: Negative for congestion, ear pain, facial swelling, hearing loss, postnasal drip, sore throat, trouble swallowing and voice change.    Eyes: Negative.  Negative for redness, itching and visual disturbance.   Respiratory: Negative for cough and shortness of breath.    Cardiovascular: Negative for palpitations and leg swelling.   Gastrointestinal: Positive for diarrhea. Negative for abdominal distention, abdominal pain, constipation, nausea and vomiting.   Endocrine:        As listed in HPI   Genitourinary: Negative.    Musculoskeletal: Positive for arthralgias. Negative for back pain, joint swelling, myalgias, neck pain and neck stiffness.   Skin: Negative.    Allergic/Immunologic: Negative.    Neurological: Positive for numbness (sensitive top of the feet bilaterally, constant pain worse at night. ). Negative for syncope and light-headedness.   Hematological: Negative.    Psychiatric/Behavioral: Negative.    All other systems reviewed and are negative.      Physical exam  Objective   Blood pressure 128/80, pulse 84, height 64\" (162.6 cm), weight 238 lb 3.2 oz (108 kg), SpO2 98 %.   Physical Exam   Constitutional: She is oriented to person, place, and time. She appears well-developed and well-nourished.   HENT:   Head: Normocephalic and atraumatic.   Eyes: Conjunctivae are normal.   Neck: No JVD present. No thyromegaly present.   The neck is supple and symmetric   Cardiovascular: Normal rate, regular rhythm and normal heart sounds.    Pulmonary/Chest: Effort normal and breath sounds normal.   Musculoskeletal: Normal range of motion. She exhibits no edema.    Ann had a diabetic foot exam performed (toes are red, capillary refill is normal. ) today.   During the foot exam she had a monofilament test performed.    Neurological Sensory Findings -  Altered sharp/dull right ankle/foot discrimination (toes decreased sensation " to microfilament ) and altered sharp/dull left ankle/foot discrimination. No right ankle/foot altered proprioception and no left ankle/foot altered proprioception    Vascular Status -  Her exam exhibits right foot vasculature normal. Her exam exhibits no right foot edema. Her exam exhibits left foot vasculature normal. Her exam exhibits no left foot edema.   Skin Integrity  -  Her right foot skin is not intact (right big toe - s/p nail removed, wound is dressed, clean. ).    Ann 's left foot skin is intact. .  Lymphadenopathy:     She has no cervical adenopathy.   Neurological: She is alert and oriented to person, place, and time. She has normal reflexes.   Skin: Skin is warm and dry. No rash noted.   Psychiatric: She has a normal mood and affect. Thought content normal.   Vitals reviewed.          Results for orders placed or performed in visit on 09/22/17   POC Glucose Fingerstick   Result Value Ref Range    Glucose 185 (A) 70 - 130 mg/dL   POC Glycosylated Hemoglobin (Hb A1C)   Result Value Ref Range    Hemoglobin A1C 8.8 %     Lab Results   Component Value Date    HGBA1C 8.8 09/22/2017    HGBA1C 9.7 06/08/2017    HGBA1C 10.5 03/08/2017     Lab Results   Component Value Date    MICROALBUR 28.3 06/08/2017    CREATININE 0.70 06/08/2017     Thyroid ultrasound 6/08/17      Real time high resolution imaging of the thyroid gland was performed in transverse and longitudinal planes.      The right lobe measured 2.82 cm L x 0.92 cm AP x 1.43 cm in TV dimension.    The isthmus measured 0.26 cm in thickness.    The left thyroid lobe measured 2.47 cm L x 1.48 cm AP x 1.18 cm in TV dimension.    Thyroid gland is small, hypoechoic and contains single nodule.    Nodule 1   is located in the right lobe and measures 0.9 x 0.45 x 0.58 cm (L X AP X TV).  This nodule is solid, homogeneous, hypoechoic with irregular margins, and Grade II vascularity on Color Flow Doppler.  It has no artifacts    No pathologic lymph nodes were  seen.      Assessment: Stable decreased in size nodule.   Follow-up ultrasound in 24 months      Assessment  Assessment/Plan   Problem List Items Addressed This Visit        Endocrine    Hypothyroidism    Relevant Medications    levothyroxine (SYNTHROID) 75 MCG tablet    levothyroxine (SYNTHROID, LEVOTHROID) 200 MCG tablet    Nontoxic multinodular goiter    Relevant Medications    levothyroxine (SYNTHROID) 75 MCG tablet    levothyroxine (SYNTHROID, LEVOTHROID) 200 MCG tablet    Type 2 diabetes mellitus, uncontrolled - Primary    Relevant Orders    POC Glucose Fingerstick (Completed)    POC Glycosylated Hemoglobin (Hb A1C) (Completed)       Other    Diabetes mellitus with diabetic polyneuropathy, with long-term current use of insulin          Plan  Levothyroxine  275 mcg/day. Repeat labs today      -cont metformin and Victoza  1.8 mg daily  -Tresiba 100 Units. Apidra fro prandial coverage with 2 largest meals.    Goals of glucose reviewed. Diabetes diet and complication prevention reviewed in details. New instructions provided. Gabapentin refilled  Patient Instructions     Results for orders placed or performed in visit on 09/22/17   POC Glucose Fingerstick   Result Value Ref Range    Glucose 185 (A) 70 - 130 mg/dL   POC Glycosylated Hemoglobin (Hb A1C)   Result Value Ref Range    Hemoglobin A1C 8.8 %       Continue Victoza 1.8 mg a day and metformin   Take Tresiba 100 units daily       2. Start injection with meal insulin  (Apidra) 15 units with breakfast and  20 units with dinner.      Correction insulin (Apidra) - add on to meal insulin if your sugars are > 250, or could use separately to control high glucose if you are not eating.     5 units 250-300  8 units  301-350  10 units 351-400  15 units > 400.             Thyroid u/s 6/8/2017 showed stable nodules. Monitor every 2 years, due in 2019.   Flu shot administered today   Fasting labs next visit.   Follow-up in 2-3 months

## 2017-09-22 NOTE — PATIENT INSTRUCTIONS
Results for orders placed or performed in visit on 09/22/17   POC Glucose Fingerstick   Result Value Ref Range    Glucose 185 (A) 70 - 130 mg/dL   POC Glycosylated Hemoglobin (Hb A1C)   Result Value Ref Range    Hemoglobin A1C 8.8 %       Continue Victoza 1.8 mg a day and metformin   Take Tresiba 100 units daily       2. Start injection with meal insulin  (Apidra) 15 units with breakfast and  20 units with dinner.      Correction insulin (Apidra) - add on to meal insulin if your sugars are > 250, or could use separately to control high glucose if you are not eating.     5 units 250-300  8 units  301-350  10 units 351-400  15 units > 400.

## 2017-09-23 RX ORDER — LEVOTHYROXINE SODIUM 0.07 MG/1
TABLET ORAL
Qty: 30 TABLET | Refills: 3 | Status: SHIPPED | OUTPATIENT
Start: 2017-09-23 | End: 2018-01-21 | Stop reason: SDUPTHER

## 2017-09-29 ENCOUNTER — TELEPHONE (OUTPATIENT)
Dept: INTERNAL MEDICINE | Facility: CLINIC | Age: 47
End: 2017-09-29

## 2017-10-02 RX ORDER — INSULIN ASPART 100 [IU]/ML
15 INJECTION, SOLUTION INTRAVENOUS; SUBCUTANEOUS DAILY
Qty: 15 ML | Refills: 5 | Status: SHIPPED | OUTPATIENT
Start: 2017-10-02 | End: 2018-03-28 | Stop reason: ALTCHOICE

## 2017-11-06 RX ORDER — METFORMIN HYDROCHLORIDE 500 MG/1
TABLET, EXTENDED RELEASE ORAL
Qty: 90 TABLET | Refills: 0 | Status: SHIPPED | OUTPATIENT
Start: 2017-11-06 | End: 2018-03-19 | Stop reason: SDUPTHER

## 2017-11-10 RX ORDER — VALSARTAN AND HYDROCHLOROTHIAZIDE 160; 12.5 MG/1; MG/1
TABLET, FILM COATED ORAL
Qty: 90 TABLET | Refills: 1 | Status: SHIPPED | OUTPATIENT
Start: 2017-11-10 | End: 2018-06-10 | Stop reason: SDUPTHER

## 2017-12-01 ENCOUNTER — TELEPHONE (OUTPATIENT)
Dept: INTERNAL MEDICINE | Facility: CLINIC | Age: 47
End: 2017-12-01

## 2018-01-10 ENCOUNTER — LAB (OUTPATIENT)
Dept: INTERNAL MEDICINE | Facility: CLINIC | Age: 48
End: 2018-01-10

## 2018-01-10 ENCOUNTER — TELEPHONE (OUTPATIENT)
Dept: INTERNAL MEDICINE | Facility: CLINIC | Age: 48
End: 2018-01-10

## 2018-01-10 DIAGNOSIS — E03.9 ACQUIRED HYPOTHYROIDISM: ICD-10-CM

## 2018-01-10 DIAGNOSIS — E11.65 UNCONTROLLED TYPE 2 DIABETES MELLITUS WITH COMPLICATION, UNSPECIFIED LONG TERM INSULIN USE STATUS: ICD-10-CM

## 2018-01-10 DIAGNOSIS — E11.8 UNCONTROLLED TYPE 2 DIABETES MELLITUS WITH COMPLICATION, UNSPECIFIED LONG TERM INSULIN USE STATUS: ICD-10-CM

## 2018-01-10 LAB
ALBUMIN SERPL-MCNC: 4.3 G/DL (ref 3.2–4.8)
ALBUMIN/GLOB SERPL: 1.6 G/DL (ref 1.5–2.5)
ALP SERPL-CCNC: 56 U/L (ref 25–100)
ALT SERPL W P-5'-P-CCNC: 24 U/L (ref 7–40)
ANION GAP SERPL CALCULATED.3IONS-SCNC: 8 MMOL/L (ref 3–11)
AST SERPL-CCNC: 18 U/L (ref 0–33)
BILIRUB SERPL-MCNC: 0.4 MG/DL (ref 0.3–1.2)
BUN BLD-MCNC: 26 MG/DL (ref 9–23)
BUN/CREAT SERPL: 32.5 (ref 7–25)
CALCIUM SPEC-SCNC: 9.9 MG/DL (ref 8.7–10.4)
CHLORIDE SERPL-SCNC: 98 MMOL/L (ref 99–109)
CO2 SERPL-SCNC: 27 MMOL/L (ref 20–31)
CREAT BLD-MCNC: 0.8 MG/DL (ref 0.6–1.3)
GFR SERPL CREATININE-BSD FRML MDRD: 77 ML/MIN/1.73
GLOBULIN UR ELPH-MCNC: 2.7 GM/DL
GLUCOSE BLD-MCNC: 149 MG/DL (ref 70–100)
POTASSIUM BLD-SCNC: 4.8 MMOL/L (ref 3.5–5.5)
PROT SERPL-MCNC: 7 G/DL (ref 5.7–8.2)
SODIUM BLD-SCNC: 133 MMOL/L (ref 132–146)
T4 FREE SERPL-MCNC: 1.51 NG/DL (ref 0.89–1.76)
TSH SERPL DL<=0.05 MIU/L-ACNC: 1.53 MIU/ML (ref 0.35–5.35)

## 2018-01-10 PROCEDURE — 80053 COMPREHEN METABOLIC PANEL: CPT | Performed by: INTERNAL MEDICINE

## 2018-01-10 PROCEDURE — 84439 ASSAY OF FREE THYROXINE: CPT | Performed by: INTERNAL MEDICINE

## 2018-01-10 PROCEDURE — 84443 ASSAY THYROID STIM HORMONE: CPT | Performed by: INTERNAL MEDICINE

## 2018-01-10 NOTE — TELEPHONE ENCOUNTER
PATIENT NEEDS POTASSIUM LAB RESULT SENT TO DR CUMMINS PRIOR TO UPCOMING KNEE SURGERY AT [facility]. [facility] IS HERE TODAY FOR LAB DRAW FOR JEAN

## 2018-01-11 ENCOUNTER — TELEPHONE (OUTPATIENT)
Dept: INTERNAL MEDICINE | Facility: CLINIC | Age: 48
End: 2018-01-11

## 2018-01-11 NOTE — TELEPHONE ENCOUNTER
RAUL WITH [facility]  NEEDS US TO FAX HER POTASSIUM LEVEL  ITS NOT SIGNED OFF ON    PLEASE CALL THEM [phone]  [phone]

## 2018-01-12 ENCOUNTER — TELEPHONE (OUTPATIENT)
Dept: INTERNAL MEDICINE | Facility: CLINIC | Age: 48
End: 2018-01-12

## 2018-01-12 NOTE — TELEPHONE ENCOUNTER
----- Message from Bree GARCIA MD sent at 1/12/2018  9:03 AM EST -----  Thyroid function is normal and basic metabolic panel is fine with exception of slightly elevated BUN . It could be a sign of mild dehydration- increase fluid intake.

## 2018-01-12 NOTE — TELEPHONE ENCOUNTER
I faxed results to [facility] @ f: [phone].   Pt is scheduled to have a Left knee scope on 01/17/18

## 2018-01-22 RX ORDER — LEVOTHYROXINE SODIUM 0.07 MG/1
TABLET ORAL
Qty: 90 TABLET | Refills: 0 | Status: SHIPPED | OUTPATIENT
Start: 2018-01-22 | End: 2018-05-23 | Stop reason: ALTCHOICE

## 2018-03-05 ENCOUNTER — HOSPITAL ENCOUNTER (EMERGENCY)
Age: 48
Discharge: HOME | End: 2018-03-05
Payer: COMMERCIAL

## 2018-03-05 VITALS
TEMPERATURE: 98 F | DIASTOLIC BLOOD PRESSURE: 70 MMHG | SYSTOLIC BLOOD PRESSURE: 138 MMHG | HEART RATE: 78 BPM | OXYGEN SATURATION: 99 % | RESPIRATION RATE: 20 BRPM

## 2018-03-05 VITALS
TEMPERATURE: 98.96 F | RESPIRATION RATE: 22 BRPM | OXYGEN SATURATION: 95 % | SYSTOLIC BLOOD PRESSURE: 162 MMHG | DIASTOLIC BLOOD PRESSURE: 92 MMHG | HEART RATE: 88 BPM

## 2018-03-05 VITALS — BODY MASS INDEX: 43.7 KG/M2

## 2018-03-05 DIAGNOSIS — E11.65: ICD-10-CM

## 2018-03-05 DIAGNOSIS — Z79.84: ICD-10-CM

## 2018-03-05 DIAGNOSIS — K29.80: Primary | ICD-10-CM

## 2018-03-05 DIAGNOSIS — Z79.4: ICD-10-CM

## 2018-03-05 LAB
ALBUMIN LEVEL: 3 GM/DL (ref 3.4–5)
ALBUMIN/GLOB SERPL: 0.9 {RATIO} (ref 1.1–1.8)
ALP ISO SERPL-ACNC: 63 U/L (ref 46–116)
ALT SERPLBLD-CCNC: 32 U/L (ref 12–78)
AMYLASE SERPL-CCNC: 26 U/L (ref 25–125)
ANION GAP SERPL CALC-SCNC: 9.2 MEQ/L (ref 5–15)
AST SERPL QL: 19 U/L (ref 15–37)
BILIRUBIN,TOTAL: 0.2 MG/DL (ref 0.2–1)
BUN SERPL-MCNC: 13 MG/DL (ref 7–18)
CALCIUM SPEC-MCNC: 7.9 MG/DL (ref 8.5–10.1)
CHLORIDE SPEC-SCNC: 100 MMOL/L (ref 98–107)
CK SERPL-CCNC: 48 U/L (ref 26–192)
CKMB RELATIVE INDEX: 1 U/L (ref 0–4)
CO2 SERPL-SCNC: 34 MMOL/L (ref 21–32)
COLOR UR: YELLOW
CREAT BLD-SCNC: 0.84 MG/DL (ref 0.55–1.02)
CREATININE CLEARANCE ESTIMATED: 71 ML/MIN (ref 0–300)
ESTIMATED GLOMERULAR FILT RATE: 72 ML/MIN (ref 60–?)
GFR (AFRICAN AMERICAN): 88 ML/MIN (ref 60–?)
GLOBULIN SER CALC-MCNC: 3.5 GM/DL (ref 1.3–3.2)
GLUCOSE UR QL STRIP.AUTO: (no result)
GLUCOSE: 217 MG/DL (ref 74–106)
HCT VFR BLD CALC: 37.7 % (ref 37–47)
HGB BLD-MCNC: 12.4 G/DL (ref 12.2–16.2)
KETONES UR STRIP.AUTO-MCNC: (no result) MG/DL
LEUKOCYTE ESTERASE UR QL STRIP: (no result)
LIPASE: 101 U/L (ref 73–393)
MCHC RBC-ENTMCNC: 32.8 G/DL (ref 31.8–35.4)
MCV RBC: 90.3 FL (ref 81–99)
MEAN CORPUSCULAR HEMOGLOBIN: 29.6 PG (ref 27–31.2)
MICRO URNS: (no result)
PH UR: 8.5 [PH] (ref 5–8.5)
PLATELET # BLD: 256 K/MM3 (ref 142–424)
POTASSIUM: 4.2 MMOL/L (ref 3.5–5.1)
PROT SERPL-MCNC: 6.5 GM/DL (ref 6.4–8.2)
PROT UR STRIP-MCNC: (no result) MG/DL
RBC # BLD AUTO: 4.18 M/MM3 (ref 4.2–5.4)
SODIUM SPEC-SCNC: 139 MMOL/L (ref 136–145)
SP GR UR: 1.01 (ref 1–1.03)
TROPONIN I: < 0.02 NG/ML (ref 0–0.06)
TSH SERPL-ACNC: 7.07 UIU/ML (ref 0.36–3.74)
UROBILINOGEN UR QL: 0.2 EU/DL
WBC # BLD AUTO: 11.6 K/MM3 (ref 4.8–10.8)

## 2018-03-05 PROCEDURE — 84443 ASSAY THYROID STIM HORMONE: CPT

## 2018-03-05 PROCEDURE — 81001 URINALYSIS AUTO W/SCOPE: CPT

## 2018-03-05 PROCEDURE — 83880 ASSAY OF NATRIURETIC PEPTIDE: CPT

## 2018-03-05 PROCEDURE — 84484 ASSAY OF TROPONIN QUANT: CPT

## 2018-03-05 PROCEDURE — 87040 BLOOD CULTURE FOR BACTERIA: CPT

## 2018-03-05 PROCEDURE — 93005 ELECTROCARDIOGRAM TRACING: CPT

## 2018-03-05 PROCEDURE — 82553 CREATINE MB FRACTION: CPT

## 2018-03-05 PROCEDURE — 83690 ASSAY OF LIPASE: CPT

## 2018-03-05 PROCEDURE — 83605 ASSAY OF LACTIC ACID: CPT

## 2018-03-05 PROCEDURE — 74176 CT ABD & PELVIS W/O CONTRAST: CPT

## 2018-03-05 PROCEDURE — 99283 EMERGENCY DEPT VISIT LOW MDM: CPT

## 2018-03-05 PROCEDURE — 82150 ASSAY OF AMYLASE: CPT

## 2018-03-05 PROCEDURE — 85025 COMPLETE CBC W/AUTO DIFF WBC: CPT

## 2018-03-05 PROCEDURE — 96374 THER/PROPH/DIAG INJ IV PUSH: CPT

## 2018-03-05 PROCEDURE — 80053 COMPREHEN METABOLIC PANEL: CPT

## 2018-03-05 PROCEDURE — 96375 TX/PRO/DX INJ NEW DRUG ADDON: CPT

## 2018-03-05 PROCEDURE — 82550 ASSAY OF CK (CPK): CPT

## 2018-03-05 PROCEDURE — 71046 X-RAY EXAM CHEST 2 VIEWS: CPT

## 2018-03-06 LAB
BACTERIA URNS QL MICRO: (no result) /LPF
SQUAMOUS URNS QL MICRO: (no result) #/HPF (ref 0–5)
WBC # UR: (no result) #/HPF (ref 0–3)

## 2018-03-19 RX ORDER — METFORMIN HYDROCHLORIDE 500 MG/1
TABLET, EXTENDED RELEASE ORAL
Qty: 90 TABLET | Refills: 1 | Status: SHIPPED | OUTPATIENT
Start: 2018-03-19 | End: 2018-12-19 | Stop reason: SDUPTHER

## 2018-03-22 RX ORDER — GABAPENTIN 300 MG/1
CAPSULE ORAL
Qty: 90 CAPSULE | Refills: 0 | Status: SHIPPED | OUTPATIENT
Start: 2018-03-22 | End: 2018-05-22 | Stop reason: SDUPTHER

## 2018-03-28 RX ORDER — LIRAGLUTIDE 6 MG/ML
INJECTION SUBCUTANEOUS
Qty: 3 PEN | Refills: 3 | Status: SHIPPED | OUTPATIENT
Start: 2018-03-28 | End: 2018-06-21 | Stop reason: SDUPTHER

## 2018-03-30 RX ORDER — LEVOTHYROXINE SODIUM 0.2 MG/1
200 TABLET ORAL DAILY
Qty: 90 TABLET | Refills: 1 | Status: SHIPPED | OUTPATIENT
Start: 2018-03-30 | End: 2018-11-30 | Stop reason: SDUPTHER

## 2018-03-30 RX ORDER — ERGOCALCIFEROL 1.25 MG/1
CAPSULE ORAL
Qty: 12 CAPSULE | Refills: 1 | Status: SHIPPED | OUTPATIENT
Start: 2018-03-30 | End: 2018-10-01 | Stop reason: SDUPTHER

## 2018-04-13 RX ORDER — CYANOCOBALAMIN 1000 UG/ML
INJECTION, SOLUTION INTRAMUSCULAR; SUBCUTANEOUS
Qty: 3 ML | Refills: 1 | Status: SHIPPED | OUTPATIENT
Start: 2018-04-13 | End: 2018-10-14 | Stop reason: SDUPTHER

## 2018-05-22 ENCOUNTER — OFFICE VISIT (OUTPATIENT)
Dept: ENDOCRINOLOGY | Facility: CLINIC | Age: 48
End: 2018-05-22

## 2018-05-22 VITALS
BODY MASS INDEX: 40.77 KG/M2 | SYSTOLIC BLOOD PRESSURE: 122 MMHG | HEIGHT: 64 IN | WEIGHT: 238.8 LBS | HEART RATE: 84 BPM | OXYGEN SATURATION: 96 % | DIASTOLIC BLOOD PRESSURE: 74 MMHG

## 2018-05-22 DIAGNOSIS — E11.8 UNCONTROLLED TYPE 2 DIABETES MELLITUS WITH COMPLICATION, UNSPECIFIED LONG TERM INSULIN USE STATUS: Primary | ICD-10-CM

## 2018-05-22 DIAGNOSIS — E11.65 UNCONTROLLED TYPE 2 DIABETES MELLITUS WITH COMPLICATION, UNSPECIFIED LONG TERM INSULIN USE STATUS: Primary | ICD-10-CM

## 2018-05-22 DIAGNOSIS — E55.9 VITAMIN D DEFICIENCY: ICD-10-CM

## 2018-05-22 DIAGNOSIS — E03.9 ACQUIRED HYPOTHYROIDISM: ICD-10-CM

## 2018-05-22 LAB
ALBUMIN SERPL-MCNC: 4.1 G/DL (ref 3.2–4.8)
ALBUMIN/GLOB SERPL: 1.5 G/DL (ref 1.5–2.5)
ALP SERPL-CCNC: 56 U/L (ref 25–100)
ALT SERPL W P-5'-P-CCNC: 23 U/L (ref 7–40)
ANION GAP SERPL CALCULATED.3IONS-SCNC: 5 MMOL/L (ref 3–11)
ARTICHOKE IGE QN: 100 MG/DL (ref 0–130)
AST SERPL-CCNC: 15 U/L (ref 0–33)
BILIRUB SERPL-MCNC: 0.4 MG/DL (ref 0.3–1.2)
BUN BLD-MCNC: 14 MG/DL (ref 9–23)
BUN/CREAT SERPL: 23.3 (ref 7–25)
CALCIUM SPEC-SCNC: 9.4 MG/DL (ref 8.7–10.4)
CHLORIDE SERPL-SCNC: 100 MMOL/L (ref 99–109)
CHOLEST SERPL-MCNC: 181 MG/DL (ref 0–200)
CO2 SERPL-SCNC: 34 MMOL/L (ref 20–31)
CREAT BLD-MCNC: 0.6 MG/DL (ref 0.6–1.3)
GFR SERPL CREATININE-BSD FRML MDRD: 107 ML/MIN/1.73
GLOBULIN UR ELPH-MCNC: 2.7 GM/DL
GLUCOSE BLD-MCNC: 111 MG/DL (ref 70–100)
GLUCOSE BLDC GLUCOMTR-MCNC: 120 MG/DL (ref 70–130)
HBA1C MFR BLD: 9.2 %
HDLC SERPL-MCNC: 40 MG/DL (ref 40–60)
POTASSIUM BLD-SCNC: 4.3 MMOL/L (ref 3.5–5.5)
PROT SERPL-MCNC: 6.8 G/DL (ref 5.7–8.2)
SODIUM BLD-SCNC: 139 MMOL/L (ref 132–146)
T4 FREE SERPL-MCNC: 2.04 NG/DL (ref 0.89–1.76)
TRIGL SERPL-MCNC: 423 MG/DL (ref 0–150)
TSH SERPL DL<=0.05 MIU/L-ACNC: 0.65 MIU/ML (ref 0.35–5.35)

## 2018-05-22 PROCEDURE — 80061 LIPID PANEL: CPT | Performed by: INTERNAL MEDICINE

## 2018-05-22 PROCEDURE — 82947 ASSAY GLUCOSE BLOOD QUANT: CPT | Performed by: INTERNAL MEDICINE

## 2018-05-22 PROCEDURE — 80053 COMPREHEN METABOLIC PANEL: CPT | Performed by: INTERNAL MEDICINE

## 2018-05-22 PROCEDURE — 84439 ASSAY OF FREE THYROXINE: CPT | Performed by: INTERNAL MEDICINE

## 2018-05-22 PROCEDURE — 82043 UR ALBUMIN QUANTITATIVE: CPT | Performed by: INTERNAL MEDICINE

## 2018-05-22 PROCEDURE — 84443 ASSAY THYROID STIM HORMONE: CPT | Performed by: INTERNAL MEDICINE

## 2018-05-22 PROCEDURE — 99214 OFFICE O/P EST MOD 30 MIN: CPT | Performed by: INTERNAL MEDICINE

## 2018-05-22 PROCEDURE — 82570 ASSAY OF URINE CREATININE: CPT | Performed by: INTERNAL MEDICINE

## 2018-05-22 PROCEDURE — 83036 HEMOGLOBIN GLYCOSYLATED A1C: CPT | Performed by: INTERNAL MEDICINE

## 2018-05-22 RX ORDER — GABAPENTIN 300 MG/1
600 CAPSULE ORAL 3 TIMES DAILY
Qty: 180 CAPSULE | Refills: 3 | Status: SHIPPED | OUTPATIENT
Start: 2018-05-22 | End: 2018-10-08 | Stop reason: SDUPTHER

## 2018-05-22 RX ORDER — INSULIN GLARGINE 100 [IU]/ML
100 INJECTION, SOLUTION SUBCUTANEOUS NIGHTLY
Qty: 12 PEN | Refills: 11 | Status: SHIPPED | OUTPATIENT
Start: 2018-05-22 | End: 2019-01-29

## 2018-05-22 RX ORDER — GABAPENTIN 300 MG/1
600 CAPSULE ORAL 3 TIMES DAILY
Qty: 180 CAPSULE | Refills: 3 | Status: SHIPPED | OUTPATIENT
Start: 2018-05-22 | End: 2018-05-22 | Stop reason: SDUPTHER

## 2018-05-22 NOTE — PROGRESS NOTES
Chief complaint  Diabetes (F/u for type 2 diabetes and hypothyroidism) and Hypothyroidism    Subjective   Ann Galdamez is a 47 y.o. female is here today for follow-up.  Follow-up for DM type 2 dx 2012.  Complications: noticed burning on the top of the feet, tingling at night.  on neurontin 300 mg at bedtime is effective for the symptoms.      Due for eye exam.   NUtrition - largest meal is dinner.   Medications:  metformin 500 mg ER daily. Victoza 1.8 mg. Basaglar and Novolog     Past meds: Invokana in the past caused yeast infection and it was stopped.   Monitoring: glucose is 200 in the morning. , more variability during    Hypothyroidism dx 2006. In July 2014 [place] levothyroxine combo was transitioned to levothyroxine, 275 mcg last visit. Doing well and compliant with the medication.   Last TSH was normal       Thyroid nodules were stable stable, last u/s in 2015    Patient reported compliance with meds, doesn't test often. She is considering weight loss surgery.  Her goal is to loose wieght and discontinue some of her medications.   She c/o severe pain in the feet and takes 3 tabs of neurontin at night with partial relief of pain.     Diabetes     Hypothyroidism   Associated symptoms include arthralgias and numbness (sensitive top of the feet bilaterally, constant pain worse at night. ). Pertinent negatives include no abdominal pain, chills, congestion, coughing, diaphoresis, joint swelling, myalgias, nausea, neck pain, sore throat or vomiting.       Medications    Current Outpatient Prescriptions:   •  Blood Glucose Monitoring Suppl (ONE TOUCH ULTRA MINI) W/DEVICE kit, Test 3 times a day, Disp: , Rfl:   •  colestipol (COLESTID) 1 G tablet, Take 1 tablet by mouth 2 (Two) Times a Day., Disp: , Rfl: 3  •  CRANBERRY PO, Take 1 capsule by mouth daily., Disp: , Rfl:   •  cyanocobalamin 1000 MCG/ML injection, INJECT 1 ML UNDER THE SKIN EVERY 28 DAYS, Disp: 3 mL, Rfl: 1  •  gabapentin (NEURONTIN) 300 MG capsule,  Take 2 capsules by mouth 3 (Three) Times a Day., Disp: 180 capsule, Rfl: 3  •  glucose blood (ONE TOUCH ULTRA TEST) test strip, To test blood glucose level 3 times per day., Disp: 300 each, Rfl: 1  •  insulin aspart (NOVOLOG FLEXPEN) 100 UNIT/ML solution pen-injector sc pen, Inject 30 Units under the skin 2 (Two) Times a Day., Disp: 60 mL, Rfl: 1  •  Insulin Glargine (BASAGLAR KWIKPEN) 100 UNIT/ML injection pen, Inject 100 Units under the skin Every Night., Disp: 12 pen, Rfl: 11  •  Insulin Pen Needle (B-D UF III MINI PEN NEEDLES) 31G X 5 MM misc, 1 each 2 (Two) Times a Day., Disp: 60 each, Rfl: 5  •  levothyroxine (SYNTHROID, LEVOTHROID) 200 MCG tablet, TAKE 1 TABLET BY MOUTH DAILY, Disp: 90 tablet, Rfl: 1  •  levothyroxine (SYNTHROID, LEVOTHROID) 75 MCG tablet, TAKE 1 TABLET BY MOUTH DAILY, Disp: 90 tablet, Rfl: 0  •  Liraglutide (VICTOZA) 18 MG/3ML solution pen-injector injection, Inject 1.8 mg under the skin Daily., Disp: 9 pen, Rfl: 0  •  medroxyPROGESTERone (PROVERA) 10 MG tablet, Take 10 mg by mouth daily., Disp: , Rfl:   •  metFORMIN ER (GLUCOPHAGE-XR) 500 MG 24 hr tablet, TAKE 1 TABLET BY MOUTH DAILY, Disp: 90 tablet, Rfl: 1  •  ONETOUCH DELICA LANCETS 33G misc, Test 3 times daily, Disp: , Rfl:   •  valsartan-hydrochlorothiazide (DIOVAN-HCT) 160-12.5 MG per tablet, TAKE 1 TABLET EVERY DAY, Disp: 90 tablet, Rfl: 1  •  VICTOZA 18 MG/3ML solution pen-injector injection, INJECT 1.8MG UNDER THE SKIN DAILY, Disp: 3 pen, Rfl: 3  •  vitamin D (ERGOCALCIFEROL) 53720 units capsule capsule, TAKE 1 CAPSULE BY MOUTH EVERY 7 DAYS, Disp: 12 capsule, Rfl: 1    PMH  The following portions of the patient's history were reviewed and updated as appropriate: allergies, current medications, past family history, past medical history, past social history, past surgical history and problem list.    Review of systems  Review of Systems   Constitutional: Positive for unexpected weight change (weight gain). Negative for activity  "change, appetite change, chills and diaphoresis.   HENT: Negative for congestion, ear pain, facial swelling, hearing loss, postnasal drip, sore throat, trouble swallowing and voice change.    Eyes: Negative.  Negative for redness, itching and visual disturbance.   Respiratory: Negative for cough and shortness of breath.    Cardiovascular: Negative for palpitations and leg swelling.   Gastrointestinal: Positive for diarrhea. Negative for abdominal distention, abdominal pain, constipation, nausea and vomiting.   Endocrine:        As listed in HPI   Genitourinary: Negative.    Musculoskeletal: Positive for arthralgias. Negative for back pain, joint swelling, myalgias, neck pain and neck stiffness.   Skin: Negative.    Allergic/Immunologic: Negative.    Neurological: Positive for numbness (sensitive top of the feet bilaterally, constant pain worse at night. ). Negative for syncope and light-headedness.   Hematological: Negative.    Psychiatric/Behavioral: Negative.    All other systems reviewed and are negative.      Physical exam  Objective   Blood pressure 122/74, pulse 84, height 162.6 cm (64\"), weight 108 kg (238 lb 12.8 oz), SpO2 96 %.   Physical Exam   Constitutional: She is oriented to person, place, and time. She appears well-developed and well-nourished.   HENT:   Head: Normocephalic and atraumatic.   Eyes: Conjunctivae are normal.   Neck: No JVD present. No thyromegaly present.   The neck is supple and symmetric   Cardiovascular: Normal rate, regular rhythm and normal heart sounds.    Pulmonary/Chest: Effort normal and breath sounds normal.   Musculoskeletal: Normal range of motion. She exhibits no edema.    Ann had a diabetic foot exam performed (toes are red, capillary refill is normal. ) today.   During the foot exam she had a monofilament test performed.    Neurological Sensory Findings -  Altered sharp/dull right ankle/foot discrimination (toes decreased sensation to microfilament ) and altered " sharp/dull left ankle/foot discrimination. No right ankle/foot altered proprioception and no left ankle/foot altered proprioception  Vascular Status -  Her right foot exhibits abnormal foot vasculature . Her right foot exhibits no edema. Her left foot exhibits abnormal foot vasculature . Her left foot exhibits no edema.  Skin Integrity  -  Her right foot skin is intact (right big toe - s/p nail removed, wound is dressed, clean. ).  Her left foot skin is not intact..  Lymphadenopathy:     She has no cervical adenopathy.   Neurological: She is alert and oriented to person, place, and time. She has normal reflexes.   Skin: Skin is warm and dry. No rash noted.   Psychiatric: She has a normal mood and affect. Thought content normal.   Vitals reviewed.          Results for orders placed or performed in visit on 05/22/18   Lipid Panel   Result Value Ref Range    Total Cholesterol 181 0 - 200 mg/dL    Triglycerides 423 (H) 0 - 150 mg/dL    HDL Cholesterol 40 40 - 60 mg/dL    LDL Cholesterol  100 0 - 130 mg/dL   Comprehensive Metabolic Panel   Result Value Ref Range    Glucose 111 (H) 70 - 100 mg/dL    BUN 14 9 - 23 mg/dL    Creatinine 0.60 0.60 - 1.30 mg/dL    Sodium 139 132 - 146 mmol/L    Potassium 4.3 3.5 - 5.5 mmol/L    Chloride 100 99 - 109 mmol/L    CO2 34.0 (H) 20.0 - 31.0 mmol/L    Calcium 9.4 8.7 - 10.4 mg/dL    Total Protein 6.8 5.7 - 8.2 g/dL    Albumin 4.10 3.20 - 4.80 g/dL    ALT (SGPT) 23 7 - 40 U/L    AST (SGOT) 15 0 - 33 U/L    Alkaline Phosphatase 56 25 - 100 U/L    Total Bilirubin 0.4 0.3 - 1.2 mg/dL    eGFR Non African Amer 107 >60 mL/min/1.73    Globulin 2.7 gm/dL    A/G Ratio 1.5 1.5 - 2.5 g/dL    BUN/Creatinine Ratio 23.3 7.0 - 25.0    Anion Gap 5.0 3.0 - 11.0 mmol/L   TSH   Result Value Ref Range    TSH 0.652 0.350 - 5.350 mIU/mL   T4, Free   Result Value Ref Range    Free T4 2.04 (H) 0.89 - 1.76 ng/dL   POC Glucose Fingerstick   Result Value Ref Range    Glucose 120 70 - 130 mg/dL   POC Glycosylated  Hemoglobin (Hb A1C)   Result Value Ref Range    Hemoglobin A1C 9.2 %     Lab Results   Component Value Date    HGBA1C 9.2 05/22/2018    HGBA1C 8.8 09/22/2017    HGBA1C 9.7 06/08/2017     Lab Results   Component Value Date    MICROALBUR 28.3 06/08/2017    CREATININE 0.60 05/22/2018     Thyroid ultrasound 6/08/17      Real time high resolution imaging of the thyroid gland was performed in transverse and longitudinal planes.      The right lobe measured 2.82 cm L x 0.92 cm AP x 1.43 cm in TV dimension.    The isthmus measured 0.26 cm in thickness.    The left thyroid lobe measured 2.47 cm L x 1.48 cm AP x 1.18 cm in TV dimension.    Thyroid gland is small, hypoechoic and contains single nodule.    Nodule 1   is located in the right lobe and measures 0.9 x 0.45 x 0.58 cm (L X AP X TV).  This nodule is solid, homogeneous, hypoechoic with irregular margins, and Grade II vascularity on Color Flow Doppler.  It has no artifacts    No pathologic lymph nodes were seen.      Assessment: Stable decreased in size nodule.   Follow-up ultrasound in 24 months      Assessment  Assessment/Plan   Problem List Items Addressed This Visit        Digestive    Vitamin D deficiency       Endocrine    Hypothyroidism    Relevant Orders    TSH (Completed)    T4, Free (Completed)    Type 2 diabetes mellitus, uncontrolled - Primary    Relevant Medications    Insulin Glargine (BASAGLAR KWIKPEN) 100 UNIT/ML injection pen    Other Relevant Orders    POC Glucose Fingerstick (Completed)    POC Glycosylated Hemoglobin (Hb A1C) (Completed)    Lipid Panel (Completed)    Comprehensive Metabolic Panel (Completed)    Microalbumin / Creatinine Urine Ratio - Urine, Clean Catch          Plan  Levothyroxine  275 mcg/day changed to 250 mcg daily. Repeat labs today showed high TFT     - diabetes control has worsened and we have reviewed the diet and new insulin instructions.     Patient Instructions   Continue Victoza 1.8 mg a day and metformin   Basaglar 60  Units    Novolog 15 Units with meals.     5 units 250-300  10 units  301-350  15 units 351-400  20 units > 400.             Thyroid u/s 6/8/2017 showed stable nodules. Monitor every 2 years, due in 2019.     Follow-up in 3 months

## 2018-05-22 NOTE — PATIENT INSTRUCTIONS
Continue Victoza 1.8 mg a day and metformin   Basaglar 60 Units    Novolog 15 Units with meals.     5 units 250-300  10 units  301-350  15 units 351-400  20 units > 400.

## 2018-05-23 ENCOUNTER — TELEPHONE (OUTPATIENT)
Dept: ENDOCRINOLOGY | Facility: CLINIC | Age: 48
End: 2018-05-23

## 2018-05-23 LAB
CREAT 24H UR-MCNC: 70.5 MG/DL
MICROALBUMIN UR-MCNC: 5 UG/ML
MICROALBUMIN/CREAT UR: 7.1 MG/G CREAT (ref 0–30)

## 2018-05-23 RX ORDER — LEVOTHYROXINE SODIUM 0.05 MG/1
50 TABLET ORAL DAILY
Qty: 30 TABLET | Refills: 5 | Status: SHIPPED | OUTPATIENT
Start: 2018-05-23 | End: 2018-12-19 | Stop reason: SDUPTHER

## 2018-05-23 NOTE — TELEPHONE ENCOUNTER
----- Message from Bree GARCIA MD sent at 5/22/2018  7:04 PM EDT -----  Please call the patient regarding her lab results. Thyroid function is high and we have to reduce the dose of your medication to 250 mcg (send rx for 50 mcg in addition to 200 mcg instead of current med) Lipid panel is normal and kidney function is good.

## 2018-06-07 NOTE — TELEPHONE ENCOUNTER
Received a 90 day refill request via fax from [facility] pharmacy for Novolog flex pen, escribed per protocol.

## 2018-06-10 RX ORDER — VALSARTAN AND HYDROCHLOROTHIAZIDE 160; 12.5 MG/1; MG/1
TABLET, FILM COATED ORAL
Qty: 90 TABLET | Refills: 1 | Status: SHIPPED | OUTPATIENT
Start: 2018-06-10 | End: 2018-09-12 | Stop reason: ALTCHOICE

## 2018-06-19 ENCOUNTER — TELEPHONE (OUTPATIENT)
Dept: INTERNAL MEDICINE | Facility: CLINIC | Age: 48
End: 2018-06-19

## 2018-06-19 NOTE — TELEPHONE ENCOUNTER
PATIENT CALLED DUE TO HER [place] TIMING OUT AND WOULD LIKE TO HAVE DOCTOR JEAN REINSTATE IT AS SHE DID LAST YR AND THE YR BEFORE THAT. THE PATIENT WOULD LIKE TO GET A CALL BACK IN REGARDS TO THIS MATTER -[phone]

## 2018-06-21 RX ORDER — LIRAGLUTIDE 6 MG/ML
INJECTION SUBCUTANEOUS
Qty: 9 PEN | Refills: 1 | Status: SHIPPED | OUTPATIENT
Start: 2018-06-21 | End: 2018-06-25 | Stop reason: SDUPTHER

## 2018-06-21 NOTE — TELEPHONE ENCOUNTER
PATIENT IS CALLING BACK TO CHECK THE STATUS OF [facility] PAPER WORK. SHE NEEDS TO KNOW IF SHE NEEDS NEW PAPERWORK TO FILL OUT OF IF WE CAN REINSTATE THE ONE WE HAVE ON FILE WHICH IS ABOUT TO . PLEASE CONTACT PATIENT BACK -[phone]

## 2018-06-22 NOTE — TELEPHONE ENCOUNTER
Could you please find in chart last year FMLA, reprint it and copy last page with white out, so I can update the date and fax them back the FMLA>

## 2018-06-28 NOTE — TELEPHONE ENCOUNTER
6/29/2017 scanned [facility] has all pages. PLease print out and I will sign when I am back in a week. If pt needs it earlier, ask dr Cloud if she wouldn't mind signing.

## 2018-08-23 RX ORDER — FLURBIPROFEN SODIUM 0.3 MG/ML
SOLUTION/ DROPS OPHTHALMIC
Qty: 100 EACH | Refills: 5 | Status: SHIPPED | OUTPATIENT
Start: 2018-08-23 | End: 2019-01-29

## 2018-09-12 ENCOUNTER — OFFICE VISIT (OUTPATIENT)
Dept: ENDOCRINOLOGY | Facility: CLINIC | Age: 48
End: 2018-09-12

## 2018-09-12 VITALS
OXYGEN SATURATION: 98 % | HEART RATE: 89 BPM | WEIGHT: 234.8 LBS | BODY MASS INDEX: 40.08 KG/M2 | HEIGHT: 64 IN | DIASTOLIC BLOOD PRESSURE: 78 MMHG | SYSTOLIC BLOOD PRESSURE: 122 MMHG

## 2018-09-12 DIAGNOSIS — E11.42 TYPE 2 DIABETES MELLITUS WITH DIABETIC POLYNEUROPATHY, WITH LONG-TERM CURRENT USE OF INSULIN (HCC): Primary | ICD-10-CM

## 2018-09-12 DIAGNOSIS — E04.2 NONTOXIC MULTINODULAR GOITER: ICD-10-CM

## 2018-09-12 DIAGNOSIS — Z79.4 TYPE 2 DIABETES MELLITUS WITH DIABETIC POLYNEUROPATHY, WITH LONG-TERM CURRENT USE OF INSULIN (HCC): Primary | ICD-10-CM

## 2018-09-12 DIAGNOSIS — E53.8 VITAMIN B12 DEFICIENCY: ICD-10-CM

## 2018-09-12 DIAGNOSIS — E55.9 VITAMIN D DEFICIENCY: ICD-10-CM

## 2018-09-12 DIAGNOSIS — E03.9 ACQUIRED HYPOTHYROIDISM: ICD-10-CM

## 2018-09-12 LAB
ALBUMIN SERPL-MCNC: 4.4 G/DL (ref 3.2–4.8)
ALBUMIN/GLOB SERPL: 2.1 G/DL (ref 1.5–2.5)
ALP SERPL-CCNC: 60 U/L (ref 25–100)
ALT SERPL W P-5'-P-CCNC: 15 U/L (ref 7–40)
ANION GAP SERPL CALCULATED.3IONS-SCNC: 8 MMOL/L (ref 3–11)
AST SERPL-CCNC: 12 U/L (ref 0–33)
BILIRUB SERPL-MCNC: 0.2 MG/DL (ref 0.3–1.2)
BUN BLD-MCNC: 24 MG/DL (ref 9–23)
BUN/CREAT SERPL: 24.7 (ref 7–25)
CALCIUM SPEC-SCNC: 9.1 MG/DL (ref 8.7–10.4)
CHLORIDE SERPL-SCNC: 100 MMOL/L (ref 99–109)
CO2 SERPL-SCNC: 29 MMOL/L (ref 20–31)
CREAT BLD-MCNC: 0.97 MG/DL (ref 0.6–1.3)
GFR SERPL CREATININE-BSD FRML MDRD: 61 ML/MIN/1.73
GLOBULIN UR ELPH-MCNC: 2.1 GM/DL
GLUCOSE BLD-MCNC: 184 MG/DL (ref 70–100)
GLUCOSE BLDC GLUCOMTR-MCNC: 161 MG/DL (ref 70–130)
HBA1C MFR BLD: 6.1 %
POTASSIUM BLD-SCNC: 4.1 MMOL/L (ref 3.5–5.5)
PROT SERPL-MCNC: 6.5 G/DL (ref 5.7–8.2)
SODIUM BLD-SCNC: 137 MMOL/L (ref 132–146)
T4 FREE SERPL-MCNC: 1.54 NG/DL (ref 0.89–1.76)
TSH SERPL DL<=0.05 MIU/L-ACNC: 2.06 MIU/ML (ref 0.35–5.35)

## 2018-09-12 PROCEDURE — 82947 ASSAY GLUCOSE BLOOD QUANT: CPT | Performed by: INTERNAL MEDICINE

## 2018-09-12 PROCEDURE — 99214 OFFICE O/P EST MOD 30 MIN: CPT | Performed by: INTERNAL MEDICINE

## 2018-09-12 PROCEDURE — 84439 ASSAY OF FREE THYROXINE: CPT | Performed by: INTERNAL MEDICINE

## 2018-09-12 PROCEDURE — 83036 HEMOGLOBIN GLYCOSYLATED A1C: CPT | Performed by: INTERNAL MEDICINE

## 2018-09-12 PROCEDURE — 80053 COMPREHEN METABOLIC PANEL: CPT | Performed by: INTERNAL MEDICINE

## 2018-09-12 PROCEDURE — 84443 ASSAY THYROID STIM HORMONE: CPT | Performed by: INTERNAL MEDICINE

## 2018-09-12 RX ORDER — LOSARTAN POTASSIUM AND HYDROCHLOROTHIAZIDE 12.5; 5 MG/1; MG/1
TABLET ORAL
COMMUNITY
Start: 2018-08-21 | End: 2019-01-29

## 2018-09-12 NOTE — PATIENT INSTRUCTIONS
Continue Victoza 1.8 mg a day and metformin     Basaglar 50 Units    Novolog 15 Units with meals.     5 units 250-300  10 units  301-350  15 units 351-400  20 units > 400.

## 2018-09-12 NOTE — PROGRESS NOTES
Chief complaint  Hypothyroidism (Follow Up Blood sugars have been pretty good No meter or log book presented, just came from work) and Diabetes (Updated paperwork on FMLA)    Subjective   Ann Galdamez is a 48 y.o. female is here today for follow-up.  Follow-up for DM type 2 dx 2012.  Complications: noticed burning on the top of the feet, tingling at night.  on neurontin 300 mg at bedtime is effective for the symptoms.      Due for eye exam.   NUtrition - largest meal is dinner.   Medications:  metformin 500 mg ER daily. Victoza 1.8 mg. Basaglar and Novolog     Past meds: Invokana in the past caused yeast infection and it was stopped.   Monitoring: glucose is 200 in the morning. , more variability during    Hypothyroidism dx 2006. In July 2014 [place] levothyroxine combo was transitioned to levothyroxine, an dthe dose was slowly reduced from 275 mcg to current dose of 200 mcg. Last change was in 5/2018, when TFT were high    Thyroid nodules were stable stable, last u/s in 2015    She is doing well, glucose improved, energy level is better. Right shin rash is biopsied and confirmed psoriasis.   Diabetes     Hypothyroidism   Associated symptoms include arthralgias, numbness (sensitive top of the feet bilaterally, constant pain worse at night. ) and a rash. Pertinent negatives include no abdominal pain, chills, congestion, coughing, diaphoresis, joint swelling, myalgias, nausea, neck pain, sore throat or vomiting.       Medications    Current Outpatient Prescriptions:   •  B-D UF III MINI PEN NEEDLES 31G X 5 MM misc, USE AS DIRECTED TWICE A DAY, Disp: 100 each, Rfl: 5  •  Blood Glucose Monitoring Suppl (ONE TOUCH ULTRA MINI) W/DEVICE kit, Test 3 times a day, Disp: , Rfl:   •  colestipol (COLESTID) 1 G tablet, Take 1 tablet by mouth 2 (Two) Times a Day., Disp: , Rfl: 3  •  CRANBERRY PO, Take 1 capsule by mouth daily., Disp: , Rfl:   •  cyanocobalamin 1000 MCG/ML injection, INJECT 1 ML UNDER THE SKIN EVERY 28 DAYS,  Disp: 3 mL, Rfl: 1  •  gabapentin (NEURONTIN) 300 MG capsule, Take 2 capsules by mouth 3 (Three) Times a Day., Disp: 180 capsule, Rfl: 3  •  glucose blood (ONE TOUCH ULTRA TEST) test strip, To test blood glucose level 3 times per day., Disp: 300 each, Rfl: 1  •  insulin aspart (NOVOLOG FLEXPEN) 100 UNIT/ML solution pen-injector sc pen, Inject 30 Units under the skin 2 (Two) Times a Day., Disp: 60 mL, Rfl: 1  •  Insulin Glargine (BASAGLAR KWIKPEN) 100 UNIT/ML injection pen, Inject 100 Units under the skin Every Night., Disp: 12 pen, Rfl: 11  •  levothyroxine (SYNTHROID, LEVOTHROID) 200 MCG tablet, TAKE 1 TABLET BY MOUTH DAILY, Disp: 90 tablet, Rfl: 1  •  levothyroxine (SYNTHROID, LEVOTHROID) 50 MCG tablet, Take 1 tablet by mouth Daily., Disp: 30 tablet, Rfl: 5  •  Liraglutide (VICTOZA) 18 MG/3ML solution pen-injector injection, 1.8 mg Daily, Disp: 27 pen, Rfl: 1  •  losartan-hydrochlorothiazide (HYZAAR) 50-12.5 MG per tablet, , Disp: , Rfl:   •  metFORMIN ER (GLUCOPHAGE-XR) 500 MG 24 hr tablet, TAKE 1 TABLET BY MOUTH DAILY, Disp: 90 tablet, Rfl: 1  •  ONETOUCH DELICA LANCETS 33G misc, Test 3 times daily, Disp: , Rfl:   •  vitamin D (ERGOCALCIFEROL) 21334 units capsule capsule, TAKE 1 CAPSULE BY MOUTH EVERY 7 DAYS, Disp: 12 capsule, Rfl: 1    PMH  The following portions of the patient's history were reviewed and updated as appropriate: allergies, current medications, past family history, past medical history, past social history, past surgical history and problem list.    Review of systems  Review of Systems   Constitutional: Positive for unexpected weight change (weight gain). Negative for activity change, appetite change, chills and diaphoresis.   HENT: Negative for congestion, ear pain, facial swelling, hearing loss, postnasal drip, sore throat, trouble swallowing and voice change.    Eyes: Negative.  Negative for redness, itching and visual disturbance.   Respiratory: Negative for cough and shortness of breath.   "  Cardiovascular: Negative for palpitations and leg swelling.   Gastrointestinal: Positive for diarrhea. Negative for abdominal distention, abdominal pain, constipation, nausea and vomiting.   Endocrine:        As listed in HPI   Genitourinary: Negative.    Musculoskeletal: Positive for arthralgias. Negative for back pain, joint swelling, myalgias, neck pain and neck stiffness.   Skin: Positive for rash.   Allergic/Immunologic: Negative.    Neurological: Positive for numbness (sensitive top of the feet bilaterally, constant pain worse at night. ). Negative for syncope and light-headedness.   Hematological: Negative.    Psychiatric/Behavioral: Negative.    All other systems reviewed and are negative.      Physical exam  Objective   Blood pressure 122/78, pulse 89, height 162.6 cm (64\"), weight 107 kg (234 lb 12.8 oz), SpO2 98 %.   Physical Exam   Constitutional: She is oriented to person, place, and time. She appears well-developed and well-nourished.   HENT:   Head: Normocephalic and atraumatic.   Eyes: Conjunctivae are normal.   Neck: No JVD present. No thyromegaly present.   The neck is supple and symmetric   Cardiovascular: Normal rate, regular rhythm and normal heart sounds.    Pulmonary/Chest: Effort normal and breath sounds normal.   Musculoskeletal: Normal range of motion. She exhibits no edema.   Lymphadenopathy:     She has no cervical adenopathy.   Neurological: She is alert and oriented to person, place, and time. She has normal reflexes.   Skin: Skin is warm and dry. No rash noted.   Right shin erythematous scaly lesion.    Psychiatric: She has a normal mood and affect. Thought content normal.   Vitals reviewed.          Results for orders placed or performed in visit on 09/12/18   POC Glucose Fingerstick   Result Value Ref Range    Glucose 161 (A) 70 - 130 mg/dL   POC Glycosylated Hemoglobin (Hb A1C)   Result Value Ref Range    Hemoglobin A1C 6.1 %     Lab Results   Component Value Date    HGBA1C 6.1 " 09/12/2018    HGBA1C 9.2 05/22/2018    HGBA1C 8.8 09/22/2017     Lab Results   Component Value Date    MICROALBUR 5.0 05/22/2018    CREATININE 0.60 05/22/2018     Thyroid ultrasound 6/08/17      Real time high resolution imaging of the thyroid gland was performed in transverse and longitudinal planes.      The right lobe measured 2.82 cm L x 0.92 cm AP x 1.43 cm in TV dimension.    The isthmus measured 0.26 cm in thickness.    The left thyroid lobe measured 2.47 cm L x 1.48 cm AP x 1.18 cm in TV dimension.    Thyroid gland is small, hypoechoic and contains single nodule.    Nodule 1   is located in the right lobe and measures 0.9 x 0.45 x 0.58 cm (L X AP X TV).  This nodule is solid, homogeneous, hypoechoic with irregular margins, and Grade II vascularity on Color Flow Doppler.  It has no artifacts    No pathologic lymph nodes were seen.      Assessment: Stable decreased in size nodule.   Follow-up ultrasound in 24 months      Assessment  Assessment/Plan   Problem List Items Addressed This Visit        Digestive    Vitamin B12 deficiency    Vitamin D deficiency       Endocrine    Diabetes mellitus with diabetic polyneuropathy, with long-term current use of insulin (CMS/[facility]) - Primary    Relevant Orders    POC Glucose Fingerstick (Completed)    POC Glycosylated Hemoglobin (Hb A1C) (Completed)    Comprehensive Metabolic Panel    TSH    T4, Free    Hypothyroidism    Nontoxic multinodular goiter          Plan  Levothyroxine  250 mcg daily. Repeat levels today.   Patient is taking Biotin 1 tab daily.    - diabetes control has improved and at goal of therapy now.  Continue the same and work on titrating down the Novolog    Patient Instructions   Continue Victoza 1.8 mg a day and metformin     Basaglar 50 Units    Novolog 15 Units with meals.     5 units 250-300  10 units  301-350  15 units 351-400  20 units > 400.         Thyroid u/s 6/8/2017 showed stable nodules. Monitor every 2 years, due in 2019.     Follow-up in 3  months

## 2018-10-01 RX ORDER — ERGOCALCIFEROL 1.25 MG/1
CAPSULE ORAL
Qty: 12 CAPSULE | Refills: 1 | Status: SHIPPED | OUTPATIENT
Start: 2018-10-01 | End: 2019-04-13 | Stop reason: SDUPTHER

## 2018-10-08 NOTE — TELEPHONE ENCOUNTER
Please Advise   LOV 05/22/2018  NOV 01/29/2019  LR 05/22/2018 3 RF   Needs Updated Felix   And CSA

## 2018-10-09 RX ORDER — GABAPENTIN 300 MG/1
CAPSULE ORAL
Qty: 180 CAPSULE | Refills: 1 | Status: SHIPPED | OUTPATIENT
Start: 2018-10-09 | End: 2019-01-29

## 2018-10-14 RX ORDER — CYANOCOBALAMIN 1000 UG/ML
INJECTION, SOLUTION INTRAMUSCULAR; SUBCUTANEOUS
Qty: 3 ML | Refills: 1 | Status: SHIPPED | OUTPATIENT
Start: 2018-10-14 | End: 2019-11-15 | Stop reason: SDUPTHER

## 2018-11-30 RX ORDER — LEVOTHYROXINE SODIUM 0.2 MG/1
200 TABLET ORAL DAILY
Qty: 90 TABLET | Refills: 1 | Status: SHIPPED | OUTPATIENT
Start: 2018-11-30 | End: 2019-08-30 | Stop reason: ALTCHOICE

## 2018-11-30 NOTE — TELEPHONE ENCOUNTER
Rx Refill request from [facility] for 90 day supply of Levothyroixine 200 mcg.  Rx E- Scrtiped

## 2018-12-19 RX ORDER — LEVOTHYROXINE SODIUM 0.05 MG/1
TABLET ORAL
Qty: 30 TABLET | Refills: 5 | Status: SHIPPED | OUTPATIENT
Start: 2018-12-19 | End: 2019-02-05 | Stop reason: DRUGHIGH

## 2018-12-19 RX ORDER — METFORMIN HYDROCHLORIDE 500 MG/1
TABLET, EXTENDED RELEASE ORAL
Qty: 90 TABLET | Refills: 1 | Status: SHIPPED | OUTPATIENT
Start: 2018-12-19 | End: 2019-01-29

## 2019-01-29 ENCOUNTER — OFFICE VISIT (OUTPATIENT)
Dept: ENDOCRINOLOGY | Facility: CLINIC | Age: 49
End: 2019-01-29

## 2019-01-29 VITALS
SYSTOLIC BLOOD PRESSURE: 110 MMHG | HEIGHT: 64 IN | WEIGHT: 202.6 LBS | BODY MASS INDEX: 34.59 KG/M2 | HEART RATE: 64 BPM | OXYGEN SATURATION: 98 % | DIASTOLIC BLOOD PRESSURE: 77 MMHG

## 2019-01-29 DIAGNOSIS — E11.40 CONTROLLED TYPE 2 DIABETES WITH NEUROPATHY (HCC): Primary | ICD-10-CM

## 2019-01-29 DIAGNOSIS — E03.9 ACQUIRED HYPOTHYROIDISM: ICD-10-CM

## 2019-01-29 DIAGNOSIS — E55.9 VITAMIN D DEFICIENCY: ICD-10-CM

## 2019-01-29 DIAGNOSIS — E04.2 NONTOXIC MULTINODULAR GOITER: ICD-10-CM

## 2019-01-29 DIAGNOSIS — E53.8 VITAMIN B12 DEFICIENCY: ICD-10-CM

## 2019-01-29 LAB
25(OH)D3 SERPL-MCNC: 45.9 NG/ML
ALBUMIN SERPL-MCNC: 4.33 G/DL (ref 3.2–4.8)
ALBUMIN/GLOB SERPL: 2.3 G/DL (ref 1.5–2.5)
ALP SERPL-CCNC: 58 U/L (ref 25–100)
ALT SERPL W P-5'-P-CCNC: 15 U/L (ref 7–40)
ANION GAP SERPL CALCULATED.3IONS-SCNC: 8 MMOL/L (ref 3–11)
AST SERPL-CCNC: 14 U/L (ref 0–33)
BILIRUB SERPL-MCNC: 0.3 MG/DL (ref 0.3–1.2)
BUN BLD-MCNC: 20 MG/DL (ref 9–23)
BUN/CREAT SERPL: 27.4 (ref 7–25)
CALCIUM SPEC-SCNC: 9.2 MG/DL (ref 8.7–10.4)
CHLORIDE SERPL-SCNC: 106 MMOL/L (ref 99–109)
CO2 SERPL-SCNC: 30 MMOL/L (ref 20–31)
CREAT BLD-MCNC: 0.73 MG/DL (ref 0.6–1.3)
GFR SERPL CREATININE-BSD FRML MDRD: 85 ML/MIN/1.73
GLOBULIN UR ELPH-MCNC: 1.9 GM/DL
GLUCOSE BLD-MCNC: 102 MG/DL (ref 70–100)
GLUCOSE BLDC GLUCOMTR-MCNC: 117 MG/DL (ref 70–130)
HBA1C MFR BLD: 6.2 %
POTASSIUM BLD-SCNC: 3.9 MMOL/L (ref 3.5–5.5)
PROT SERPL-MCNC: 6.2 G/DL (ref 5.7–8.2)
SODIUM BLD-SCNC: 144 MMOL/L (ref 132–146)
T4 FREE SERPL-MCNC: 1.09 NG/DL (ref 0.89–1.76)
TSH SERPL DL<=0.05 MIU/L-ACNC: 9 MIU/ML (ref 0.35–5.35)
VIT B12 BLD-MCNC: 925 PG/ML (ref 211–911)

## 2019-01-29 PROCEDURE — 99214 OFFICE O/P EST MOD 30 MIN: CPT | Performed by: INTERNAL MEDICINE

## 2019-01-29 PROCEDURE — 80053 COMPREHEN METABOLIC PANEL: CPT | Performed by: INTERNAL MEDICINE

## 2019-01-29 PROCEDURE — 83036 HEMOGLOBIN GLYCOSYLATED A1C: CPT | Performed by: INTERNAL MEDICINE

## 2019-01-29 PROCEDURE — 84443 ASSAY THYROID STIM HORMONE: CPT | Performed by: INTERNAL MEDICINE

## 2019-01-29 PROCEDURE — 82306 VITAMIN D 25 HYDROXY: CPT | Performed by: INTERNAL MEDICINE

## 2019-01-29 PROCEDURE — 82607 VITAMIN B-12: CPT | Performed by: INTERNAL MEDICINE

## 2019-01-29 PROCEDURE — 84439 ASSAY OF FREE THYROXINE: CPT | Performed by: INTERNAL MEDICINE

## 2019-01-29 PROCEDURE — 82947 ASSAY GLUCOSE BLOOD QUANT: CPT | Performed by: INTERNAL MEDICINE

## 2019-01-29 RX ORDER — GABAPENTIN 300 MG/1
600 CAPSULE ORAL 3 TIMES DAILY
Qty: 180 CAPSULE | Refills: 3 | Status: SHIPPED | OUTPATIENT
Start: 2019-01-29 | End: 2019-05-06

## 2019-01-29 RX ORDER — BETAMETHASONE DIPROPIONATE 0.05 %
OINTMENT (GRAM) TOPICAL
Refills: 2 | COMMUNITY
Start: 2018-11-12 | End: 2021-12-27

## 2019-01-29 RX ORDER — GABAPENTIN 300 MG/1
600 CAPSULE ORAL 3 TIMES DAILY
Qty: 180 CAPSULE | Refills: 3 | Status: SHIPPED | OUTPATIENT
Start: 2019-01-29 | End: 2019-01-29

## 2019-01-29 RX ORDER — METFORMIN HYDROCHLORIDE 500 MG/1
1000 TABLET, EXTENDED RELEASE ORAL
Qty: 60 TABLET | Refills: 11 | Status: SHIPPED | OUTPATIENT
Start: 2019-01-29 | End: 2019-05-06

## 2019-01-29 RX ORDER — OMEPRAZOLE 20 MG/1
20 CAPSULE, DELAYED RELEASE ORAL DAILY PRN
Refills: 5 | COMMUNITY
Start: 2018-10-31 | End: 2022-02-16 | Stop reason: SDUPTHER

## 2019-01-29 NOTE — PROGRESS NOTES
Chief complaint  Diabetes (Follow Up   Had gastric Sleeve, lost 50 plus took herself off Diabetis RX, feels like doing fine ) and Hypothyroidism    Subjective   Ann Galdamez is a 48 y.o. female is here today for follow-up.  Follow-up for DM type 2 dx 2012. After gastric sleeve surgery she has lost 35 lbs and came off all her diabetes medications, including insulin, victoza, metformin.     Complications: sx of neuropathy that persisted after diabetes controlled improved. She reported redness of the toes and tingling pain in the feet.   C/o burning on the top of the feet, tingling at night.  on neurontin 600 mgTID.        Hypothyroidism dx 2006. In July 2014 [place] levothyroxine combo was transitioned to levothyroxine  Current dose was 250 mcg   TSH was 50 after gastric sleeve surgery and the levels improved on repeat testing.       Thyroid nodules were stable stable, last u/s in 2017, nodules were decreased in size.         Medications    Current Outpatient Medications:   •  betamethasone dipropionate (DIPROLENE) 0.05 % ointment, APPLY TO AREAS ON TRUNK/EXTREMITIES 2X DAILY X3 WEEKS, THEN OFF 1 WK. NOT FOR FACE, PITS OR GROIN, Disp: , Rfl: 2  •  CRANBERRY PO, Take 1 capsule by mouth daily., Disp: , Rfl:   •  cyanocobalamin 1000 MCG/ML injection, INJECT 1 ML UNDER THE SKIN EVERY 28 DAYS, Disp: 3 mL, Rfl: 1  •  gabapentin (NEURONTIN) 300 MG capsule, Take 2 capsules by mouth 3 (Three) Times a Day., Disp: 180 capsule, Rfl: 3  •  levothyroxine (SYNTHROID, LEVOTHROID) 200 MCG tablet, Take 1 tablet by mouth Daily., Disp: 90 tablet, Rfl: 1  •  levothyroxine (SYNTHROID, LEVOTHROID) 50 MCG tablet, TAKE 1 TABLET BY MOUTH EVERY DAY, Disp: 30 tablet, Rfl: 5  •  omeprazole (priLOSEC) 20 MG capsule, Take 20 mg by mouth Daily., Disp: , Rfl: 5  •  vitamin D (ERGOCALCIFEROL) 02304 units capsule capsule, TAKE 1 CAPSULE BY MOUTH EVERY 7 DAYS, Disp: 12 capsule, Rfl: 1  •  metFORMIN ER (GLUCOPHAGE-XR) 500 MG 24 hr tablet, Take 2  "tablets by mouth Daily With Breakfast., Disp: 60 tablet, Rfl: 11    PMH  The following portions of the patient's history were reviewed and updated as appropriate: allergies, current medications, past family history, past medical history, past social history, past surgical history and problem list.    Review of systems  Review of Systems   Constitutional: Positive for unexpected weight change (weight gain). Negative for activity change, appetite change, chills and diaphoresis.   HENT: Negative for congestion, ear pain, facial swelling, hearing loss, postnasal drip, sore throat, trouble swallowing and voice change.    Eyes: Negative.  Negative for redness, itching and visual disturbance.   Respiratory: Negative for cough and shortness of breath.    Cardiovascular: Negative for palpitations and leg swelling.   Gastrointestinal: Positive for diarrhea. Negative for abdominal distention, abdominal pain, constipation, nausea and vomiting.   Endocrine:        As listed in HPI   Genitourinary: Negative.    Musculoskeletal: Positive for arthralgias. Negative for back pain, joint swelling, myalgias, neck pain and neck stiffness.   Skin: Positive for rash.   Allergic/Immunologic: Negative.    Neurological: Positive for numbness (sensitive top of the feet bilaterally, constant pain worse at night. ). Negative for syncope and light-headedness.   Hematological: Negative.    Psychiatric/Behavioral: Negative.    All other systems reviewed and are negative.      Physical exam  Objective   Blood pressure 110/77, pulse 64, height 162.6 cm (64\"), weight 91.9 kg (202 lb 9.6 oz), SpO2 98 %.   Physical Exam   Constitutional: She is oriented to person, place, and time. She appears well-developed and well-nourished.   HENT:   Head: Normocephalic and atraumatic.   Eyes: Conjunctivae are normal.   Neck: No thyromegaly present.   The neck is supple and symmetric   Cardiovascular: Normal rate, regular rhythm and normal heart sounds. "   Pulmonary/Chest: Effort normal and breath sounds normal.   Musculoskeletal: Normal range of motion. She exhibits no edema.   Lymphadenopathy:     She has no cervical adenopathy.   Neurological: She is alert and oriented to person, place, and time. She has normal reflexes.   Psychiatric: She has a normal mood and affect. Thought content normal.   Vitals reviewed.          Results for orders placed or performed in visit on 01/29/19   TSH   Result Value Ref Range    TSH 8.999 (H) 0.350 - 5.350 mIU/mL   T4, Free   Result Value Ref Range    Free T4 1.09 0.89 - 1.76 ng/dL   Comprehensive Metabolic Panel   Result Value Ref Range    Glucose 102 (H) 70 - 100 mg/dL    BUN 20 9 - 23 mg/dL    Creatinine 0.73 0.60 - 1.30 mg/dL    Sodium 144 132 - 146 mmol/L    Potassium 3.9 3.5 - 5.5 mmol/L    Chloride 106 99 - 109 mmol/L    CO2 30.0 20.0 - 31.0 mmol/L    Calcium 9.2 8.7 - 10.4 mg/dL    Total Protein 6.2 5.7 - 8.2 g/dL    Albumin 4.33 3.20 - 4.80 g/dL    ALT (SGPT) 15 7 - 40 U/L    AST (SGOT) 14 0 - 33 U/L    Alkaline Phosphatase 58 25 - 100 U/L    Total Bilirubin 0.3 0.3 - 1.2 mg/dL    eGFR Non African Amer 85 >60 mL/min/1.73    Globulin 1.9 gm/dL    A/G Ratio 2.3 1.5 - 2.5 g/dL    BUN/Creatinine Ratio 27.4 (H) 7.0 - 25.0    Anion Gap 8.0 3.0 - 11.0 mmol/L   Vitamin B12   Result Value Ref Range    Vitamin B-12 925 (H) 211 - 911 pg/mL   Vitamin D 25 Hydroxy   Result Value Ref Range    25 Hydroxy, Vitamin D 45.9 ng/ml   POC Glucose Fingerstick   Result Value Ref Range    Glucose 117 70 - 130 mg/dL   POC Glycosylated Hemoglobin (Hb A1C)   Result Value Ref Range    Hemoglobin A1C 6.2 %     Lab Results   Component Value Date    HGBA1C 6.2 01/29/2019    HGBA1C 6.1 09/12/2018    HGBA1C 9.2 05/22/2018     Lab Results   Component Value Date    MICROALBUR 5.0 05/22/2018    CREATININE 0.73 01/29/2019     Thyroid ultrasound 6/08/17      Real time high resolution imaging of the thyroid gland was performed in transverse and longitudinal  planes.      The right lobe measured 2.82 cm L x 0.92 cm AP x 1.43 cm in TV dimension.    The isthmus measured 0.26 cm in thickness.    The left thyroid lobe measured 2.47 cm L x 1.48 cm AP x 1.18 cm in TV dimension.    Thyroid gland is small, hypoechoic and contains single nodule.    Nodule 1   is located in the right lobe and measures 0.9 x 0.45 x 0.58 cm (L X AP X TV).  This nodule is solid, homogeneous, hypoechoic with irregular margins, and Grade II vascularity on Color Flow Doppler.  It has no artifacts    No pathologic lymph nodes were seen.      Assessment: Stable decreased in size nodule.   Follow-up ultrasound in 24 months      Assessment  Assessment/Plan   Problem List Items Addressed This Visit        Digestive    Vitamin B12 deficiency    Vitamin D deficiency       Endocrine    Hypothyroidism    Nontoxic multinodular goiter    Type 2 diabetes mellitus, uncontrolled (CMS/HCC) - Primary    Relevant Medications    metFORMIN ER (GLUCOPHAGE-XR) 500 MG 24 hr tablet          Plan  Levothyroxine  250 mcg daily. Repeat levels today showed borderline low function. Increase the dose to 275 mcg and monitro levels closely. .      diabetes control has improved without therapy. Restart metformin at 500  Mg daily.   Continue with diet    Sx of neuropathy seems to be beyond glycemic control.   -order NCS and EMG, labs to evaluate B12 and other metabolic neuropathy reasons  -I have also advised her to see podiatrist for shoe inserts    Thyroid u/s 6/8/2017 showed stable nodules. Monitor every 2 years, due in 2019.     Follow-up in 4-6 months

## 2019-02-05 ENCOUNTER — TELEPHONE (OUTPATIENT)
Dept: ENDOCRINOLOGY | Facility: CLINIC | Age: 49
End: 2019-02-05

## 2019-02-05 RX ORDER — LEVOTHYROXINE SODIUM 0.07 MG/1
75 TABLET ORAL DAILY
Qty: 30 TABLET | Refills: 11 | Status: SHIPPED | OUTPATIENT
Start: 2019-02-05 | End: 2019-08-30 | Stop reason: ALTCHOICE

## 2019-02-05 NOTE — TELEPHONE ENCOUNTER
----- Message from Bree GARCIA MD sent at 1/31/2019  5:20 AM EST -----  Please call with lab results - thyroid function is abnormal low. TSH is 8.8. It is improved from last TSH of 50, but still suggestive of low function. Increase the dose of levothyr to 275 mcg. Send rx for 75 instead of 50 to add to 200 mcg pill. Vit B12, Vit D and metabolic panel are normal.

## 2019-02-08 ENCOUNTER — HOSPITAL ENCOUNTER (OUTPATIENT)
Dept: NEUROLOGY | Facility: HOSPITAL | Age: 49
Discharge: HOME | End: 2019-02-08
Attending: INTERNAL MEDICINE | Admitting: INTERNAL MEDICINE

## 2019-02-08 DIAGNOSIS — E11.40 CONTROLLED TYPE 2 DIABETES WITH NEUROPATHY (HCC): ICD-10-CM

## 2019-02-08 PROCEDURE — 95886 MUSC TEST DONE W/N TEST COMP: CPT

## 2019-02-08 PROCEDURE — 95910 NRV CNDJ TEST 7-8 STUDIES: CPT

## 2019-03-06 ENCOUNTER — APPOINTMENT (OUTPATIENT)
Dept: NEUROLOGY | Facility: HOSPITAL | Age: 49
End: 2019-03-06
Attending: INTERNAL MEDICINE

## 2019-04-14 RX ORDER — ERGOCALCIFEROL 1.25 MG/1
CAPSULE ORAL
Qty: 4 CAPSULE | Refills: 0 | Status: SHIPPED | OUTPATIENT
Start: 2019-04-14 | End: 2019-05-01 | Stop reason: SDUPTHER

## 2019-05-01 RX ORDER — ERGOCALCIFEROL 1.25 MG/1
50000 CAPSULE ORAL
Qty: 12 CAPSULE | Refills: 0 | Status: SHIPPED | OUTPATIENT
Start: 2019-05-01 | End: 2021-03-19 | Stop reason: SDUPTHER

## 2019-05-06 ENCOUNTER — OFFICE VISIT (OUTPATIENT)
Dept: ENDOCRINOLOGY | Facility: CLINIC | Age: 49
End: 2019-05-06

## 2019-05-06 ENCOUNTER — TRANSCRIBE ORDERS (OUTPATIENT)
Dept: ADMINISTRATIVE | Facility: HOSPITAL | Age: 49
End: 2019-05-06

## 2019-05-06 VITALS
DIASTOLIC BLOOD PRESSURE: 78 MMHG | OXYGEN SATURATION: 98 % | HEART RATE: 63 BPM | BODY MASS INDEX: 35.15 KG/M2 | HEIGHT: 64 IN | WEIGHT: 205.9 LBS | SYSTOLIC BLOOD PRESSURE: 122 MMHG

## 2019-05-06 DIAGNOSIS — Z79.4 TYPE 2 DIABETES MELLITUS WITH DIABETIC POLYNEUROPATHY, WITH LONG-TERM CURRENT USE OF INSULIN (HCC): ICD-10-CM

## 2019-05-06 DIAGNOSIS — Z12.31 VISIT FOR SCREENING MAMMOGRAM: Primary | ICD-10-CM

## 2019-05-06 DIAGNOSIS — E03.9 ACQUIRED HYPOTHYROIDISM: Primary | ICD-10-CM

## 2019-05-06 DIAGNOSIS — E11.42 TYPE 2 DIABETES MELLITUS WITH DIABETIC POLYNEUROPATHY, WITH LONG-TERM CURRENT USE OF INSULIN (HCC): ICD-10-CM

## 2019-05-06 DIAGNOSIS — E11.65 UNCONTROLLED TYPE 2 DIABETES MELLITUS WITH HYPERGLYCEMIA (HCC): ICD-10-CM

## 2019-05-06 LAB
ALBUMIN SERPL-MCNC: 4.1 G/DL (ref 3.5–5.2)
ALBUMIN/GLOB SERPL: 1.5 G/DL
ALP SERPL-CCNC: 52 U/L (ref 39–117)
ALT SERPL W P-5'-P-CCNC: 6 U/L (ref 1–33)
ANION GAP SERPL CALCULATED.3IONS-SCNC: 9.6 MMOL/L
AST SERPL-CCNC: 7 U/L (ref 1–32)
BILIRUB SERPL-MCNC: 0.2 MG/DL (ref 0.2–1.2)
BUN BLD-MCNC: 14 MG/DL (ref 6–20)
BUN/CREAT SERPL: 22.2 (ref 7–25)
CALCIUM SPEC-SCNC: 9 MG/DL (ref 8.6–10.5)
CHLORIDE SERPL-SCNC: 98 MMOL/L (ref 98–107)
CO2 SERPL-SCNC: 26.4 MMOL/L (ref 22–29)
CREAT BLD-MCNC: 0.63 MG/DL (ref 0.57–1)
GFR SERPL CREATININE-BSD FRML MDRD: 100 ML/MIN/1.73
GLOBULIN UR ELPH-MCNC: 2.8 GM/DL
GLUCOSE BLD-MCNC: 100 MG/DL (ref 65–99)
GLUCOSE BLDC GLUCOMTR-MCNC: 94 MG/DL (ref 70–130)
HBA1C MFR BLD: 5.8 %
HBA1C MFR BLD: 6.01 % (ref 4.8–5.6)
POTASSIUM BLD-SCNC: 4.1 MMOL/L (ref 3.5–5.2)
PROT SERPL-MCNC: 6.9 G/DL (ref 6–8.5)
SODIUM BLD-SCNC: 134 MMOL/L (ref 136–145)
T4 FREE SERPL-MCNC: 2.34 NG/DL (ref 0.93–1.7)
TSH SERPL DL<=0.05 MIU/L-ACNC: 1.03 MIU/ML (ref 0.27–4.2)

## 2019-05-06 PROCEDURE — 82962 GLUCOSE BLOOD TEST: CPT | Performed by: INTERNAL MEDICINE

## 2019-05-06 PROCEDURE — 84439 ASSAY OF FREE THYROXINE: CPT | Performed by: INTERNAL MEDICINE

## 2019-05-06 PROCEDURE — 84443 ASSAY THYROID STIM HORMONE: CPT | Performed by: INTERNAL MEDICINE

## 2019-05-06 PROCEDURE — 99214 OFFICE O/P EST MOD 30 MIN: CPT | Performed by: INTERNAL MEDICINE

## 2019-05-06 PROCEDURE — 80053 COMPREHEN METABOLIC PANEL: CPT | Performed by: INTERNAL MEDICINE

## 2019-05-06 PROCEDURE — 83036 HEMOGLOBIN GLYCOSYLATED A1C: CPT | Performed by: INTERNAL MEDICINE

## 2019-05-06 RX ORDER — GABAPENTIN 300 MG/1
600 CAPSULE ORAL 3 TIMES DAILY
Qty: 180 CAPSULE | Refills: 4 | Status: SHIPPED | OUTPATIENT
Start: 2019-05-06 | End: 2019-11-15 | Stop reason: SDUPTHER

## 2019-05-06 NOTE — PATIENT INSTRUCTIONS
Neuropathy support vitamins:   Nerve recovery, Advances nerve support.     Vitamin B6 (As Pyridoxine HCI) 30mg / 1‚500%  Folate 400mcg / 100%  Vitamin B12 (As Methylcobalamin) 500mcg / 8‚333%  Alpha-Lipoic Acid 600mg / *  N-Acetyl-L-Cysteine (NAC) 600mg / *  Glutathione‚ Reduced (As L-Glutathione) 25mg / *

## 2019-05-06 NOTE — PROGRESS NOTES
Chief complaint  Diabetes (Follow Up) and Hypothyroidism    Subjective   Ann Galdamez is a 48 y.o. female is here today for follow-up.  Follow-up for DM type 2 dx 2012. After gastric sleeve surgery she has lost 35 lbs and came off all her diabetes medications, including insulin, victoza, metformin.   Complications: sx of neuropathy that persisted after diabetes controlled improved. She reported redness of the toes and tingling pain in the feet. The sx are persistent.   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  C/o burning on the top of the feet, tingling at night, sensation of cold and warm.  on neurontin 600 mgTID.   After starting metformin she developed diarrhea. Would like to try something else.      Hypothyroidism dx 2006. In July 2014 [place] levothyroxine combo was transitioned to levothyroxine. Current dose was 250 mcg   TSH was 50 after gastric sleeve surgery and the levels improved on repeat testing. Last labs in 1/2019 - TSH of 8 and the dose increased to 275 mcg.     Thyroid nodules were stable stable, last u/s in 2017, nodules were decreased in size.     Medications    Current Outpatient Medications:   •  betamethasone dipropionate (DIPROLENE) 0.05 % ointment, APPLY TO AREAS ON TRUNK/EXTREMITIES 2X DAILY X3 WEEKS, THEN OFF 1 WK. NOT FOR FACE, PITS OR GROIN, Disp: , Rfl: 2  •  CRANBERRY PO, Take 1 capsule by mouth daily., Disp: , Rfl:   •  cyanocobalamin 1000 MCG/ML injection, INJECT 1 ML UNDER THE SKIN EVERY 28 DAYS, Disp: 3 mL, Rfl: 1  •  gabapentin (NEURONTIN) 300 MG capsule, Take 2 capsules by mouth 3 (Three) Times a Day., Disp: 180 capsule, Rfl: 3  •  levothyroxine (SYNTHROID, LEVOTHROID) 200 MCG tablet, Take 1 tablet by mouth Daily., Disp: 90 tablet, Rfl: 1  •  levothyroxine (SYNTHROID, LEVOTHROID) 75 MCG tablet, Take 1 tablet by mouth Daily., Disp: 30 tablet, Rfl: 11  •  omeprazole (priLOSEC) 20 MG capsule, Take 20 mg by mouth Daily., Disp: , Rfl: 5  •  vitamin D (ERGOCALCIFEROL)  "32094 units capsule capsule, Take 1 capsule by mouth Every 7 (Seven) Days., Disp: 12 capsule, Rfl: 0  •  Liraglutide (VICTOZA) 18 MG/3ML solution pen-injector injection, Inject 1.2 mg under the skin into the appropriate area as directed Daily., Disp: 2 pen, Rfl: 6    PMH  The following portions of the patient's history were reviewed and updated as appropriate: allergies, current medications, past family history, past medical history, past social history, past surgical history and problem list.    Review of systems  Review of Systems   Constitutional: Positive for unexpected weight change (weight gain). Negative for activity change, appetite change, chills and diaphoresis.   HENT: Negative for congestion, ear pain, facial swelling, hearing loss, postnasal drip, sore throat, trouble swallowing and voice change.    Eyes: Negative.  Negative for redness, itching and visual disturbance.   Respiratory: Negative for cough and shortness of breath.    Cardiovascular: Negative for palpitations and leg swelling.   Gastrointestinal: Positive for diarrhea. Negative for abdominal distention, abdominal pain, constipation, nausea and vomiting.   Endocrine:        As listed in HPI   Genitourinary: Negative.    Musculoskeletal: Positive for arthralgias. Negative for back pain, joint swelling, myalgias, neck pain and neck stiffness.   Skin: Positive for rash.   Allergic/Immunologic: Negative.    Neurological: Positive for numbness (sensitive top of the feet bilaterally, constant pain worse at night. ). Negative for syncope and light-headedness.   Hematological: Negative.    Psychiatric/Behavioral: Negative.    All other systems reviewed and are negative.      Physical exam  Objective   Blood pressure 122/78, pulse 63, height 162.6 cm (64\"), weight 93.4 kg (205 lb 14.4 oz), SpO2 98 %.   Physical Exam   Constitutional: She is oriented to person, place, and time. She appears well-developed and well-nourished.   HENT:   Head: " Normocephalic and atraumatic.   Eyes: Conjunctivae are normal.   Neck: No thyromegaly present.   The neck is supple and symmetric   Cardiovascular: Normal rate, regular rhythm and normal heart sounds.   Pulmonary/Chest: Effort normal and breath sounds normal.   Musculoskeletal: Normal range of motion. She exhibits no edema.   Lymphadenopathy:     She has no cervical adenopathy.   Neurological: She is alert and oriented to person, place, and time. She has normal reflexes.   Skin:   Skin of the feet is clean, no ulcers. Tips of the toes are erythematous.    Psychiatric: She has a normal mood and affect. Thought content normal.   Vitals reviewed.      Results for orders placed or performed in visit on 05/06/19   POC Glucose Fingerstick   Result Value Ref Range    Glucose 94 70 - 130 mg/dL   POC Glycosylated Hemoglobin (Hb A1C)   Result Value Ref Range    Hemoglobin A1C 5.8 %     Lab Results   Component Value Date    HGBA1C 5.8 05/06/2019    HGBA1C 6.2 01/29/2019    HGBA1C 6.1 09/12/2018     Lab Results   Component Value Date    MICROALBUR 5.0 05/22/2018    CREATININE 0.73 01/29/2019     Thyroid ultrasound 6/08/17      Real time high resolution imaging of the thyroid gland was performed in transverse and longitudinal planes.      The right lobe measured 2.82 cm L x 0.92 cm AP x 1.43 cm in TV dimension.    The isthmus measured 0.26 cm in thickness.    The left thyroid lobe measured 2.47 cm L x 1.48 cm AP x 1.18 cm in TV dimension.    Thyroid gland is small, hypoechoic and contains single nodule.    Nodule 1   is located in the right lobe and measures 0.9 x 0.45 x 0.58 cm (L X AP X TV).  This nodule is solid, homogeneous, hypoechoic with irregular margins, and Grade II vascularity on Color Flow Doppler.  It has no artifacts    No pathologic lymph nodes were seen.      Assessment: Stable decreased in size nodule.   Follow-up ultrasound in 24 months      Assessment  Assessment/Plan   Problem List Items Addressed This Visit         Endocrine    Diabetes mellitus with diabetic polyneuropathy, with long-term current use of insulin (CMS/[facility])    Relevant Medications    Liraglutide (VICTOZA) 18 MG/3ML solution pen-injector injection    Other Relevant Orders    POC Glucose Fingerstick (Completed)    POC Glycosylated Hemoglobin (Hb A1C) (Completed)    Hypothyroidism - Primary    Type 2 diabetes mellitus, uncontrolled (CMS/[facility])    Relevant Medications    Liraglutide (VICTOZA) 18 MG/3ML solution pen-injector injection    Other Relevant Orders    POC Glucose Fingerstick (Completed)    POC Glycosylated Hemoglobin (Hb A1C) (Completed)          Plan  Levothyroxine  275 mcg and monitor levels closely. Repeat labs for dose adjustments.      Diabetes control has improved after bariatric surgery. stop metformin at 500  Mg daily and restart Victoza at 0.6, increase to 1.2 in 2 weeks if tolerated well.. A1C was 6.1, prediabetes range.   Continue with diet    Sx of neuropathy seems to be not related to glycemic control.   - NCS confirmed mild sensory neuropathy, B12 is normal . Results reviewed.   -uses arch support shoes.   -gabapentin 600 mg BID continued.   -provided with info on supplements with malu-lipoic acid and B12.     Thyroid u/s 6/8/2017 showed stable nodules. Monitor every 2 years, due in 2019. Will schedule for next visit     Follow-up in 6 months

## 2019-05-16 ENCOUNTER — TELEPHONE (OUTPATIENT)
Dept: INTERNAL MEDICINE | Facility: CLINIC | Age: 49
End: 2019-05-16

## 2019-05-16 NOTE — TELEPHONE ENCOUNTER
Pharmacy is calling to see if they can add an aceinhibitor to her regimen  Please call the pharmacy, [facility] in [place] back at [phone]

## 2019-06-12 RX ORDER — GABAPENTIN 300 MG/1
CAPSULE ORAL
Qty: 180 CAPSULE | Refills: 1 | OUTPATIENT
Start: 2019-06-12

## 2019-06-17 ENCOUNTER — HOSPITAL ENCOUNTER (OUTPATIENT)
Dept: MAMMOGRAPHY | Facility: HOSPITAL | Age: 49
Discharge: HOME | End: 2019-06-17
Admitting: FAMILY MEDICINE

## 2019-06-17 DIAGNOSIS — Z12.31 VISIT FOR SCREENING MAMMOGRAM: ICD-10-CM

## 2019-06-17 PROCEDURE — 77063 BREAST TOMOSYNTHESIS BI: CPT | Performed by: RADIOLOGY

## 2019-06-17 PROCEDURE — 77063 BREAST TOMOSYNTHESIS BI: CPT

## 2019-06-17 PROCEDURE — 77067 SCR MAMMO BI INCL CAD: CPT | Performed by: RADIOLOGY

## 2019-06-17 PROCEDURE — 77067 SCR MAMMO BI INCL CAD: CPT

## 2019-07-08 RX ORDER — METFORMIN HYDROCHLORIDE 500 MG/1
TABLET, EXTENDED RELEASE ORAL
Qty: 180 TABLET | Refills: 0 | Status: SHIPPED | OUTPATIENT
Start: 2019-07-08 | End: 2019-10-28 | Stop reason: SDUPTHER

## 2019-08-29 ENCOUNTER — TELEPHONE (OUTPATIENT)
Dept: ENDOCRINOLOGY | Facility: CLINIC | Age: 49
End: 2019-08-29

## 2019-08-30 RX ORDER — LEVOTHYROXINE SODIUM 125 UG/1
CAPSULE ORAL
Qty: 60 CAPSULE | Refills: 5 | Status: SHIPPED | OUTPATIENT
Start: 2019-08-30 | End: 2019-11-15 | Stop reason: CLARIF

## 2019-08-30 NOTE — TELEPHONE ENCOUNTER
The levels are very low and we need to increase the dose of medication. WHat dose is she currently taking? 275mcg? Did she skip medication or had any stomach issues. Ideal would be to switch to Tirosint, but not sure if insurance covers.   So if she taking her med consistently now  Option 1 - increase the dose - send her 300 mcg levothyroxine for now and encourage to take consistently,   Option 2. Tirosint 250 mcg (2 tabs of 125 mcg daily) - we can give her samples if she can stop by in the office.

## 2019-08-30 NOTE — TELEPHONE ENCOUNTER
Spoke with patient she would like to try Tirosint. She stated she has a 90 day supply of Synthroid. Should give enough time for PA on Tirosint should require one.  Patient asked if she should also take [place] Thyroid too.

## 2019-09-27 ENCOUNTER — TELEPHONE (OUTPATIENT)
Dept: INTERNAL MEDICINE | Facility: CLINIC | Age: 49
End: 2019-09-27

## 2019-09-27 NOTE — TELEPHONE ENCOUNTER
Pt was calling to see if her fmla forms were ready she needs them by 10/2/2019  She said the last time we just changed the dates on the ones from her chart  Please call pt [phone]

## 2019-09-27 NOTE — TELEPHONE ENCOUNTER
I CALLED PT BACK AND I PRINTED OFF HER OLD FMLA FORMS TO SEE IF WE CAN CHANGE THE DATE. I TOLD HER SHE WOULD BE CALLED ON MONDAY

## 2019-10-28 RX ORDER — METFORMIN HYDROCHLORIDE 500 MG/1
TABLET, EXTENDED RELEASE ORAL
Qty: 180 TABLET | Refills: 0 | Status: SHIPPED | OUTPATIENT
Start: 2019-10-28 | End: 2019-11-15

## 2019-11-15 ENCOUNTER — OFFICE VISIT (OUTPATIENT)
Dept: ENDOCRINOLOGY | Facility: CLINIC | Age: 49
End: 2019-11-15

## 2019-11-15 VITALS
OXYGEN SATURATION: 98 % | SYSTOLIC BLOOD PRESSURE: 124 MMHG | HEART RATE: 91 BPM | DIASTOLIC BLOOD PRESSURE: 82 MMHG | HEIGHT: 64 IN | WEIGHT: 200.1 LBS | BODY MASS INDEX: 34.16 KG/M2

## 2019-11-15 DIAGNOSIS — G47.00 INSOMNIA, UNSPECIFIED TYPE: ICD-10-CM

## 2019-11-15 DIAGNOSIS — E04.2 NONTOXIC MULTINODULAR GOITER: Primary | ICD-10-CM

## 2019-11-15 DIAGNOSIS — Z79.4 TYPE 2 DIABETES MELLITUS WITH DIABETIC POLYNEUROPATHY, WITH LONG-TERM CURRENT USE OF INSULIN (HCC): ICD-10-CM

## 2019-11-15 DIAGNOSIS — E11.42 TYPE 2 DIABETES MELLITUS WITH DIABETIC POLYNEUROPATHY, WITH LONG-TERM CURRENT USE OF INSULIN (HCC): ICD-10-CM

## 2019-11-15 DIAGNOSIS — E03.9 ACQUIRED HYPOTHYROIDISM: ICD-10-CM

## 2019-11-15 LAB
GLUCOSE BLDC GLUCOMTR-MCNC: 108 MG/DL (ref 70–130)
HBA1C MFR BLD: 5.7 %

## 2019-11-15 PROCEDURE — 84439 ASSAY OF FREE THYROXINE: CPT | Performed by: INTERNAL MEDICINE

## 2019-11-15 PROCEDURE — 99214 OFFICE O/P EST MOD 30 MIN: CPT | Performed by: INTERNAL MEDICINE

## 2019-11-15 PROCEDURE — 83036 HEMOGLOBIN GLYCOSYLATED A1C: CPT | Performed by: INTERNAL MEDICINE

## 2019-11-15 PROCEDURE — 82947 ASSAY GLUCOSE BLOOD QUANT: CPT | Performed by: INTERNAL MEDICINE

## 2019-11-15 PROCEDURE — 84443 ASSAY THYROID STIM HORMONE: CPT | Performed by: INTERNAL MEDICINE

## 2019-11-15 RX ORDER — BUPROPION HYDROCHLORIDE 100 MG/1
100 TABLET ORAL 2 TIMES DAILY
Refills: 2 | COMMUNITY
Start: 2019-10-21 | End: 2021-03-19

## 2019-11-15 RX ORDER — CYANOCOBALAMIN 1000 UG/ML
INJECTION, SOLUTION INTRAMUSCULAR; SUBCUTANEOUS
Qty: 3 ML | Refills: 1 | Status: SHIPPED | OUTPATIENT
Start: 2019-11-15 | End: 2021-03-19 | Stop reason: SDUPTHER

## 2019-11-15 RX ORDER — LEVOTHYROXINE SODIUM 125 UG/1
250 CAPSULE ORAL DAILY
Qty: 180 CAPSULE | Refills: 3 | Status: SHIPPED | OUTPATIENT
Start: 2019-11-15 | End: 2020-06-26 | Stop reason: ALTCHOICE

## 2019-11-15 RX ORDER — TRAZODONE HYDROCHLORIDE 50 MG/1
50 TABLET ORAL NIGHTLY
Qty: 30 TABLET | Refills: 3 | Status: SHIPPED | OUTPATIENT
Start: 2019-11-15 | End: 2019-11-16 | Stop reason: SDUPTHER

## 2019-11-15 RX ORDER — LEVOTHYROXINE SODIUM 0.2 MG/1
300 TABLET ORAL DAILY
Refills: 1 | COMMUNITY
Start: 2019-09-17 | End: 2020-01-27

## 2019-11-15 RX ORDER — CELECOXIB 100 MG/1
CAPSULE ORAL
Refills: 2 | COMMUNITY
Start: 2019-10-17 | End: 2021-12-23

## 2019-11-15 RX ORDER — GABAPENTIN 300 MG/1
600 CAPSULE ORAL 3 TIMES DAILY
Qty: 180 CAPSULE | Refills: 4 | Status: SHIPPED | OUTPATIENT
Start: 2019-11-15 | End: 2020-03-25

## 2019-11-15 NOTE — PATIENT INSTRUCTIONS
Results for orders placed or performed in visit on 11/15/19   POC Glucose Fingerstick   Result Value Ref Range    Glucose 108 70 - 130 mg/dL   POC Glycosylated Hemoglobin (Hb A1C)   Result Value Ref Range    Hemoglobin A1C 5.7 %

## 2019-11-16 LAB
T4 FREE SERPL-MCNC: 2.11 NG/DL (ref 0.93–1.7)
TSH SERPL DL<=0.05 MIU/L-ACNC: 0.1 UIU/ML (ref 0.27–4.2)

## 2019-11-16 RX ORDER — TRAZODONE HYDROCHLORIDE 50 MG/1
50 TABLET ORAL NIGHTLY
Qty: 30 TABLET | Refills: 3 | Status: SHIPPED | OUTPATIENT
Start: 2019-11-16 | End: 2020-03-20

## 2019-11-23 DIAGNOSIS — E03.9 ACQUIRED HYPOTHYROIDISM: Primary | ICD-10-CM

## 2019-11-26 ENCOUNTER — TELEPHONE (OUTPATIENT)
Dept: INTERNAL MEDICINE | Facility: CLINIC | Age: 49
End: 2019-11-26

## 2019-12-12 RX ORDER — LEVOTHYROXINE SODIUM 0.2 MG/1
TABLET ORAL
Qty: 90 TABLET | Refills: 1 | OUTPATIENT
Start: 2019-12-12

## 2020-01-27 RX ORDER — LEVOTHYROXINE SODIUM 0.2 MG/1
TABLET ORAL
Qty: 90 TABLET | Refills: 1 | Status: SHIPPED | OUTPATIENT
Start: 2020-01-27 | End: 2020-06-26 | Stop reason: ALTCHOICE

## 2020-01-31 RX ORDER — METFORMIN HYDROCHLORIDE 500 MG/1
TABLET, EXTENDED RELEASE ORAL
Qty: 180 TABLET | Refills: 3 | Status: SHIPPED | OUTPATIENT
Start: 2020-01-31 | End: 2021-03-19 | Stop reason: SINTOL

## 2020-03-18 ENCOUNTER — TELEPHONE (OUTPATIENT)
Dept: ENDOCRINOLOGY | Facility: CLINIC | Age: 50
End: 2020-03-18

## 2020-03-18 NOTE — TELEPHONE ENCOUNTER
Pt missed her appt   She wanted to know if she could get some labs drawn    pts number [phone]

## 2020-03-20 RX ORDER — TRAZODONE HYDROCHLORIDE 50 MG/1
TABLET ORAL
Qty: 30 TABLET | Refills: 0 | Status: SHIPPED | OUTPATIENT
Start: 2020-03-20 | End: 2020-04-17 | Stop reason: SDUPTHER

## 2020-03-24 DIAGNOSIS — Z79.4 TYPE 2 DIABETES MELLITUS WITH DIABETIC POLYNEUROPATHY, WITH LONG-TERM CURRENT USE OF INSULIN (HCC): Primary | ICD-10-CM

## 2020-03-24 DIAGNOSIS — E11.42 TYPE 2 DIABETES MELLITUS WITH DIABETIC POLYNEUROPATHY, WITH LONG-TERM CURRENT USE OF INSULIN (HCC): Primary | ICD-10-CM

## 2020-03-25 ENCOUNTER — TELEPHONE (OUTPATIENT)
Dept: ENDOCRINOLOGY | Facility: CLINIC | Age: 50
End: 2020-03-25

## 2020-03-25 RX ORDER — GABAPENTIN 300 MG/1
CAPSULE ORAL
Qty: 180 CAPSULE | Refills: 3 | Status: SHIPPED | OUTPATIENT
Start: 2020-03-25 | End: 2021-03-19

## 2020-03-25 NOTE — TELEPHONE ENCOUNTER
Returned patient call, advised her that Dr. Velasco sent the RX in this am.  Patient will contact pharmacy

## 2020-03-25 NOTE — TELEPHONE ENCOUNTER
PLEASE SEE REQUEST FOR CONTROLLED. LAST ov 11/15/19, PATIENT NSH 3/13/20 VISIT, NO FUTURE VISITS SCHEUDLED.  ARACELI UPDATED AND PLACED IN YOUR FOLDER

## 2020-03-25 NOTE — TELEPHONE ENCOUNTER
PT NEEDS A REFILL FOR HER GABAPENTIN IT WAS DUE YESTERDAY    PT USES [facility] IN [place]    PTS NUMBER IF YOU HAVE QUESTIONS [phone]

## 2020-03-25 NOTE — TELEPHONE ENCOUNTER
I have sent the rx, but could you call and schedule her somewhere in June or for video call this week.

## 2020-04-17 ENCOUNTER — TELEPHONE (OUTPATIENT)
Dept: ENDOCRINOLOGY | Facility: CLINIC | Age: 50
End: 2020-04-17

## 2020-04-17 RX ORDER — TRAZODONE HYDROCHLORIDE 50 MG/1
50 TABLET ORAL
Qty: 90 TABLET | Refills: 0 | Status: SHIPPED | OUTPATIENT
Start: 2020-04-17 | End: 2020-07-07

## 2020-04-17 NOTE — TELEPHONE ENCOUNTER
Scheduled pt for follow up 6/12/20 with Dr Velasco.     Pt just wanted Dr Velasco to know that she recently saw her PCP via Video Visit because her face had drooped on one side. They did MRI and Cat Scan and everything came back normal. 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 They did start her on an Anti-Viral and Steroids for 1 week that started on Tuesday.

## 2020-04-24 RX ORDER — LEVOTHYROXINE SODIUM 0.07 MG/1
TABLET ORAL
Qty: 90 TABLET | Refills: 1 | Status: SHIPPED | OUTPATIENT
Start: 2020-04-24 | End: 2020-06-26 | Stop reason: ALTCHOICE

## 2020-06-25 RX ORDER — CYANOCOBALAMIN 1000 UG/ML
INJECTION, SOLUTION INTRAMUSCULAR; SUBCUTANEOUS
Qty: 3 ML | Refills: 1 | OUTPATIENT
Start: 2020-06-25

## 2020-06-25 NOTE — TELEPHONE ENCOUNTER
PATIENT CALLED THE OFFICE TODAY REGARDING REFUSE REFILL REQUEST. PATIENT STATES THAT SHE IS UPSET THAT HER LAST TWO APPTS WERE CANCELLED DUE TO DR PENA BEING OUT OF THE OFFICE AND THAT ANOTHER APPT LAST YEAR HAD TO R/S DUE TO DR PENA BEING OUT OF THE OFFICE. PATIENT IS SCHEDULED TO SEE DR PENA ON 10/6/2020. PLEASE SEND REFILLS TO LAST UNTIL THAT TIME.

## 2020-06-25 NOTE — TELEPHONE ENCOUNTER
Do you mind to advise me on what does patient is to take  I see several doses and not sure which on to fill.  Levothy 75, Levothy 200, [id number]

## 2020-06-26 RX ORDER — LEVOTHYROXINE SODIUM 125 UG/1
250 CAPSULE ORAL DAILY
Qty: 180 CAPSULE | Refills: 0 | Status: SHIPPED | OUTPATIENT
Start: 2020-06-26 | End: 2020-06-29 | Stop reason: CLARIF

## 2020-06-26 NOTE — TELEPHONE ENCOUNTER
July 22 at 3:15 pm - could you schedule her at that time for now? She is supposed to be taking tirosint 250 mcg. I would like to see her before October. If July 22 doesn't work please ask her which days are better and I will try to accomodate.

## 2020-06-26 NOTE — TELEPHONE ENCOUNTER
LVM with appointment information for pt and to call back if date/time doesn't work so we can reschedule.

## 2020-06-29 RX ORDER — LEVOTHYROXINE SODIUM 0.12 MG/1
TABLET ORAL
Qty: 60 TABLET | Refills: 5 | Status: SHIPPED | OUTPATIENT
Start: 2020-06-29 | End: 2020-07-28 | Stop reason: SDUPTHER

## 2020-06-29 NOTE — TELEPHONE ENCOUNTER
PA  For Tirosint was denied.  RX E- Scripted for Synthroid/Levothyroixine Will discuss at up coming appt on medication change

## 2020-07-07 RX ORDER — TRAZODONE HYDROCHLORIDE 50 MG/1
TABLET ORAL
Qty: 90 TABLET | Refills: 0 | Status: SHIPPED | OUTPATIENT
Start: 2020-07-07 | End: 2020-10-16 | Stop reason: SDUPTHER

## 2020-07-08 ENCOUNTER — TELEPHONE (OUTPATIENT)
Dept: ENDOCRINOLOGY | Facility: CLINIC | Age: 50
End: 2020-07-08

## 2020-07-17 ENCOUNTER — TELEPHONE (OUTPATIENT)
Dept: ENDOCRINOLOGY | Facility: CLINIC | Age: 50
End: 2020-07-17

## 2020-07-17 NOTE — TELEPHONE ENCOUNTER
Pharmacy faxed a request for Gabapentin. Last OV 11/2019  Patient has an appt next week 7/22/20  Would you want to wait until then or send to pharmacy  Felix Updated and placed in your folder

## 2020-07-20 RX ORDER — LEVOTHYROXINE SODIUM 0.2 MG/1
TABLET ORAL
Qty: 90 TABLET | Refills: 1 | OUTPATIENT
Start: 2020-07-20

## 2020-07-28 RX ORDER — LEVOTHYROXINE SODIUM 0.12 MG/1
TABLET ORAL
Qty: 180 TABLET | Refills: 1 | Status: SHIPPED | OUTPATIENT
Start: 2020-07-28 | End: 2021-03-19 | Stop reason: DRUGHIGH

## 2020-08-31 RX ORDER — LEVOTHYROXINE SODIUM 0.2 MG/1
TABLET ORAL
Qty: 90 TABLET | Refills: 1 | OUTPATIENT
Start: 2020-08-31

## 2020-10-16 RX ORDER — TRAZODONE HYDROCHLORIDE 50 MG/1
TABLET ORAL
Qty: 90 TABLET | Refills: 0 | Status: SHIPPED | OUTPATIENT
Start: 2020-10-16 | End: 2021-03-19 | Stop reason: SDUPTHER

## 2020-10-20 RX ORDER — LEVOTHYROXINE SODIUM 0.2 MG/1
TABLET ORAL
Qty: 90 TABLET | Refills: 1 | OUTPATIENT
Start: 2020-10-20

## 2020-12-18 ENCOUNTER — HOSPITAL ENCOUNTER (EMERGENCY)
Age: 50
Discharge: HOME | End: 2020-12-18
Payer: COMMERCIAL

## 2020-12-18 VITALS
TEMPERATURE: 98.78 F | OXYGEN SATURATION: 97 % | RESPIRATION RATE: 16 BRPM | DIASTOLIC BLOOD PRESSURE: 76 MMHG | HEART RATE: 87 BPM | SYSTOLIC BLOOD PRESSURE: 142 MMHG

## 2020-12-18 VITALS — BODY MASS INDEX: 38.6 KG/M2

## 2020-12-18 VITALS
RESPIRATION RATE: 16 BRPM | SYSTOLIC BLOOD PRESSURE: 142 MMHG | HEART RATE: 87 BPM | TEMPERATURE: 98.78 F | DIASTOLIC BLOOD PRESSURE: 76 MMHG | OXYGEN SATURATION: 97 %

## 2020-12-18 DIAGNOSIS — J01.00: ICD-10-CM

## 2020-12-18 DIAGNOSIS — E11.9: ICD-10-CM

## 2020-12-18 DIAGNOSIS — Z20.828: Primary | ICD-10-CM

## 2020-12-18 DIAGNOSIS — Z79.899: ICD-10-CM

## 2020-12-18 PROCEDURE — 99201: CPT

## 2020-12-18 PROCEDURE — U0004 COV-19 TEST NON-CDC HGH THRU: HCPCS

## 2020-12-19 LAB — COVID-19 NASAL PCR SENDOUT P&C: NEGATIVE

## 2020-12-24 RX ORDER — LEVOTHYROXINE SODIUM 0.07 MG/1
TABLET ORAL
Qty: 90 TABLET | Refills: 1 | OUTPATIENT
Start: 2020-12-24

## 2021-02-18 RX ORDER — TRAZODONE HYDROCHLORIDE 50 MG/1
TABLET ORAL
Qty: 90 TABLET | Refills: 0 | OUTPATIENT
Start: 2021-02-18

## 2021-03-08 ENCOUNTER — TELEPHONE (OUTPATIENT)
Dept: ENDOCRINOLOGY | Facility: CLINIC | Age: 51
End: 2021-03-08

## 2021-03-08 NOTE — TELEPHONE ENCOUNTER
Patient needs refill for metformin and lyrica, please call to advise and next available appt is scheduled in September. Prefer to be seen sooner by someone else.

## 2021-03-09 NOTE — TELEPHONE ENCOUNTER
Last OV 11/15/2019 Last 6 appts have either been cancelled or NS    11/12/20 Pt canceled  10/6/20  Provider cancelled  9/8/20 Provider canceled  7/22/20 [facility]  6/12/20 Provider cancelled  3/13/20 [facility]     Future appt is not until 9/2/21 Out of protocol for controlled and need updated labs for Metformin as has not been seen since 2019 I have  not run Felix or pended   medication yet     I will send to [place] to schedule asap or with PA

## 2021-03-10 NOTE — TELEPHONE ENCOUNTER
LVM FOR PT TO CALL TO RESCHEDULE HER CURRENTLY SCHEDULED F/U WITH DANIELLE PENA. NEEDS IN ASAP W/ HER OR VEDRANA.

## 2021-03-16 NOTE — TELEPHONE ENCOUNTER
This patient needs to be seen sooner. I can see her this Friday March 19 at 12:30 pm for instance.

## 2021-03-19 ENCOUNTER — OFFICE VISIT (OUTPATIENT)
Dept: ENDOCRINOLOGY | Facility: CLINIC | Age: 51
End: 2021-03-19

## 2021-03-19 VITALS
HEIGHT: 64 IN | OXYGEN SATURATION: 98 % | SYSTOLIC BLOOD PRESSURE: 130 MMHG | WEIGHT: 221.3 LBS | HEART RATE: 87 BPM | TEMPERATURE: 97.7 F | BODY MASS INDEX: 37.78 KG/M2 | DIASTOLIC BLOOD PRESSURE: 77 MMHG

## 2021-03-19 DIAGNOSIS — E03.9 ACQUIRED HYPOTHYROIDISM: ICD-10-CM

## 2021-03-19 DIAGNOSIS — Z79.4 TYPE 2 DIABETES MELLITUS WITH DIABETIC POLYNEUROPATHY, WITH LONG-TERM CURRENT USE OF INSULIN (HCC): ICD-10-CM

## 2021-03-19 DIAGNOSIS — E53.8 VITAMIN B12 DEFICIENCY: ICD-10-CM

## 2021-03-19 DIAGNOSIS — E11.42 TYPE 2 DIABETES MELLITUS WITH DIABETIC POLYNEUROPATHY, WITH LONG-TERM CURRENT USE OF INSULIN (HCC): ICD-10-CM

## 2021-03-19 DIAGNOSIS — E11.65 UNCONTROLLED TYPE 2 DIABETES MELLITUS WITH HYPERGLYCEMIA (HCC): Primary | ICD-10-CM

## 2021-03-19 LAB
EXPIRATION DATE: ABNORMAL
EXPIRATION DATE: NORMAL
GLUCOSE BLDC GLUCOMTR-MCNC: 218 MG/DL (ref 70–130)
HBA1C MFR BLD: 7.7 %
Lab: ABNORMAL
Lab: NORMAL

## 2021-03-19 PROCEDURE — 83036 HEMOGLOBIN GLYCOSYLATED A1C: CPT | Performed by: INTERNAL MEDICINE

## 2021-03-19 PROCEDURE — 82947 ASSAY GLUCOSE BLOOD QUANT: CPT | Performed by: INTERNAL MEDICINE

## 2021-03-19 PROCEDURE — 99214 OFFICE O/P EST MOD 30 MIN: CPT | Performed by: INTERNAL MEDICINE

## 2021-03-19 RX ORDER — CYANOCOBALAMIN 1000 UG/ML
1000 INJECTION, SOLUTION INTRAMUSCULAR; SUBCUTANEOUS
Qty: 3 ML | Refills: 3 | Status: SHIPPED | OUTPATIENT
Start: 2021-03-19 | End: 2021-05-14 | Stop reason: SDUPTHER

## 2021-03-19 RX ORDER — ERGOCALCIFEROL 1.25 MG/1
50000 CAPSULE ORAL
Qty: 12 CAPSULE | Refills: 3 | Status: SHIPPED | OUTPATIENT
Start: 2021-03-19 | End: 2021-12-15

## 2021-03-19 RX ORDER — PREGABALIN 300 MG/1
300 CAPSULE ORAL 2 TIMES DAILY
Qty: 60 CAPSULE | Refills: 3 | Status: SHIPPED | OUTPATIENT
Start: 2021-03-19 | End: 2021-05-14 | Stop reason: SDUPTHER

## 2021-03-19 RX ORDER — SEMAGLUTIDE 1.34 MG/ML
0.5 INJECTION, SOLUTION SUBCUTANEOUS WEEKLY
Qty: 3 ML | Refills: 3 | Status: SHIPPED | OUTPATIENT
Start: 2021-03-19 | End: 2021-09-15

## 2021-03-19 RX ORDER — SEMAGLUTIDE 1.34 MG/ML
0.5 INJECTION, SOLUTION SUBCUTANEOUS WEEKLY
Qty: 2 PEN | Refills: 11 | Status: SHIPPED | OUTPATIENT
Start: 2021-03-19 | End: 2021-03-19 | Stop reason: SDUPTHER

## 2021-03-19 RX ORDER — CYANOCOBALAMIN 1000 UG/ML
1000 INJECTION, SOLUTION INTRAMUSCULAR; SUBCUTANEOUS
Status: DISCONTINUED | OUTPATIENT
Start: 2021-03-19 | End: 2021-10-29

## 2021-03-19 RX ORDER — LEVOTHYROXINE SODIUM 0.15 MG/1
300 TABLET ORAL DAILY
Qty: 60 TABLET | Refills: 11 | Status: SHIPPED | OUTPATIENT
Start: 2021-03-19 | End: 2021-05-18 | Stop reason: SDUPTHER

## 2021-03-19 RX ORDER — PREGABALIN 150 MG/1
150 CAPSULE ORAL 2 TIMES DAILY
Qty: 60 CAPSULE | Refills: 3 | Status: SHIPPED | OUTPATIENT
Start: 2021-03-19 | End: 2021-03-19 | Stop reason: SDUPTHER

## 2021-03-19 RX ORDER — LOSARTAN POTASSIUM AND HYDROCHLOROTHIAZIDE 12.5; 5 MG/1; MG/1
1 TABLET ORAL DAILY
COMMUNITY
Start: 2021-03-11

## 2021-03-19 RX ORDER — LEVOTHYROXINE SODIUM 0.2 MG/1
TABLET ORAL
COMMUNITY
Start: 2021-03-16 | End: 2021-03-19

## 2021-03-19 RX ORDER — LEVOTHYROXINE SODIUM 0.05 MG/1
TABLET ORAL
COMMUNITY
End: 2021-03-19

## 2021-03-19 RX ORDER — TRAZODONE HYDROCHLORIDE 50 MG/1
50 TABLET ORAL
Qty: 90 TABLET | Refills: 3 | Status: SHIPPED | OUTPATIENT
Start: 2021-03-19 | End: 2022-02-16 | Stop reason: SDUPTHER

## 2021-03-19 RX ORDER — PREGABALIN 150 MG/1
CAPSULE ORAL
COMMUNITY
Start: 2021-01-15 | End: 2021-03-19 | Stop reason: SDUPTHER

## 2021-03-19 NOTE — PROGRESS NOTES
Chief complaint  Hypothyroidism (Follow Up) and Diabetes    Subjective   Ann Galdamez is a 50 y.o. female is here today for follow-up.  Follow-up for DM type 2 dx 2012. After gastric sleeve surgery she has lost 35 lbs and came off all her diabetes medications, including insulin, victoza, metformin.   Complications: sx of neuropathy that persisted after diabetes controlled improved, neuropathy improved. She reported redness of the toes and tingling pain in the feet.  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  C/o burning on the top of the feet, tingling at night, sensation of cold and warm.  on neurontin 600 mgTID and later changed to lyrica 300 mg BID. .      Hypothyroidism dx 2006. In July 2014 [place] levothyroxine combo was transitioned to levothyroxine. Current dose was 250 mcg   TSH was 50 after gastric sleeve surgery and the levels improved on repeat testing. Last labs in 1/2019 - TSH of 8 and the dose increased to 275 mcg.   TSH was 91.9 and in August 2019 and we have transitioned to Tirosint 250 mcg daily.         Medications    Current Outpatient Medications:   •  betamethasone dipropionate (DIPROLENE) 0.05 % ointment, APPLY TO AREAS ON TRUNK/EXTREMITIES 2X DAILY X3 WEEKS, THEN OFF 1 WK. NOT FOR FACE, PITS OR GROIN, Disp: , Rfl: 2  •  celecoxib (CeleBREX) 100 MG capsule, TAKE 1 CAPSULE BY MOUTH TWICE A DAY WITH FOOD, Disp: , Rfl: 2  •  CRANBERRY PO, Take 1 capsule by mouth daily., Disp: , Rfl:   •  cyanocobalamin 1000 MCG/ML injection, Inject 1 mL under the skin into the appropriate area as directed Every 28 (Twenty-Eight) Days., Disp: 3 mL, Rfl: 3  •  losartan-hydrochlorothiazide (HYZAAR) 50-12.5 MG per tablet, , Disp: , Rfl:   •  omeprazole (priLOSEC) 20 MG capsule, Take 20 mg by mouth Daily., Disp: , Rfl: 5  •  pregabalin (LYRICA) 300 MG capsule, Take 1 capsule by mouth 2 (Two) Times a Day., Disp: 60 capsule, Rfl: 3  •  traZODone (DESYREL) 50 MG tablet, Take 1 tablet by mouth every night  "at bedtime., Disp: 90 tablet, Rfl: 3  •  vitamin D (ERGOCALCIFEROL) 1.25 MG (09292 UT) capsule capsule, Take 1 capsule by mouth Every 7 (Seven) Days., Disp: 12 capsule, Rfl: 3  •  levothyroxine (SYNTHROID, LEVOTHROID) 150 MCG tablet, Take 2 tablets by mouth Daily., Disp: 60 tablet, Rfl: 11  •  Semaglutide,0.25 or 0.5MG/DOS, (Ozempic, 0.25 or 0.5 MG/DOSE,) 2 MG/1.5ML solution pen-injector, Inject 0.5 mg under the skin into the appropriate area as directed 1 (One) Time Per Week., Disp: 3 mL, Rfl: 3    Current Facility-Administered Medications:   •  cyanocobalamin injection 1,000 mcg, 1,000 mcg, Intramuscular, Q28 Days, Bree Velasco MD      Review of systems  Review of Systems   Constitutional: Positive for unexpected weight change (weight gain). Negative for activity change, appetite change, chills and diaphoresis.   HENT: Negative for congestion, ear pain, facial swelling, hearing loss, postnasal drip, sore throat, trouble swallowing and voice change.    Eyes: Negative.  Negative for redness, itching and visual disturbance.   Respiratory: Negative for cough and shortness of breath.    Cardiovascular: Negative for palpitations and leg swelling.   Gastrointestinal: Negative for abdominal distention, abdominal pain, constipation, diarrhea, nausea and vomiting.   Endocrine:        As listed in HPI   Genitourinary: Negative.    Musculoskeletal: Positive for arthralgias. Negative for back pain, joint swelling, myalgias, neck pain and neck stiffness.   Skin: Positive for rash.   Allergic/Immunologic: Negative.    Neurological: Positive for numbness (sensitive top of the feet bilaterally, constant pain worse at night. ). Negative for syncope and light-headedness.   Hematological: Negative.    Psychiatric/Behavioral: Negative.    All other systems reviewed and are negative.      Physical exam  Objective   Blood pressure 130/77, pulse 87, temperature 97.7 °F (36.5 °C), height 162.6 cm (64\"), weight 100 kg (221 lb 4.8 oz), " SpO2 98 %.   Physical Exam   Constitutional: She is oriented to person, place, and time. She appears well-developed.   HENT:   Head: Normocephalic and atraumatic.   Eyes: Conjunctivae are normal.   Neck: No thyromegaly present.   The neck is supple and symmetric   Cardiovascular: Normal rate, regular rhythm and normal heart sounds.   Pulmonary/Chest: Effort normal and breath sounds normal.   Musculoskeletal: Normal range of motion.   Lymphadenopathy:     She has no cervical adenopathy.   Neurological: She is alert and oriented to person, place, and time. She has normal reflexes.   Skin:   Skin of the feet is clean, no ulcers. Tips of the toes are erythematous.    Psychiatric: Thought content normal.   Vitals reviewed.      Results for orders placed or performed in visit on 03/19/21   POC Glucose, Blood    Specimen: Blood   Result Value Ref Range    Glucose 218 (A) 70 - 130 mg/dL    Lot Number [id number]     Expiration Date 12/10/2021    POC Glycosylated Hemoglobin (Hb A1C)    Specimen: Blood   Result Value Ref Range    Hemoglobin A1C 7.7 %    Lot Number [id number]     Expiration Date 11/24/2022      Lab Results   Component Value Date    HGBA1C 7.7 03/19/2021    HGBA1C 5.7 11/15/2019    HGBA1C 6.01 (H) 05/06/2019     Lab Results   Component Value Date    MICROALBUR 5.0 05/22/2018    CREATININE 0.63 05/06/2019       Assessment  Assessment/Plan   Problems Addressed this Visit        Other    Diabetes mellitus with diabetic polyneuropathy, with long-term current use of insulin (CMS/[facility])    Relevant Medications    Semaglutide,0.25 or 0.5MG/DOS, (Ozempic, 0.25 or 0.5 MG/DOSE,) 2 MG/1.5ML solution pen-injector    Hypothyroidism    Relevant Medications    levothyroxine (SYNTHROID, LEVOTHROID) 150 MCG tablet    Other Relevant Orders    T4, Free    TSH    Type 2 diabetes mellitus, uncontrolled (CMS/[facility]) - Primary    Relevant Medications    Semaglutide,0.25 or 0.5MG/DOS, (Ozempic, 0.25 or 0.5 MG/DOSE,) 2 MG/1.5ML solution  pen-injector    pregabalin (LYRICA) 300 MG capsule    Other Relevant Orders    POC Glucose, Blood (Completed)    POC Glycosylated Hemoglobin (Hb A1C) (Completed)    Vitamin B12 deficiency    Relevant Medications    cyanocobalamin injection 1,000 mcg (Start on 3/19/2021 12:32 PM)      Diagnoses       Codes Comments    Uncontrolled type 2 diabetes mellitus with hyperglycemia (CMS/[facility])    -  Primary ICD-10-CM: E11.65  ICD-9-CM: 250.02     Acquired hypothyroidism     ICD-10-CM: E03.9  ICD-9-CM: 244.9     Type 2 diabetes mellitus with diabetic polyneuropathy, with long-term current use of insulin (CMS/[facility])     ICD-10-CM: E11.42, Z79.4  ICD-9-CM: 250.60, 357.2, V58.67     Vitamin B12 deficiency     ICD-10-CM: E53.8  ICD-9-CM: 266.2           Plan   Levothyroxine 300 mcg daily - sample given and I will also send rx to [facility] pharmacy to assess the cost. . Repeat labs for dose adjustments in 2.5 months.      Diabetes mellitus with worsening control. She had A1C as high as 9.   -Victoza 1.2 mg changed to Ozempic and it was denied. I will resend the rx to [facility]  -Continue with diet.  -patient was taking metformin but it caused severe diarrhea.     -gabapentin 600 mg BID switched to lyrica 300 mg BID. Refill sent  ARACELI query complete. Treatment plan to include limited course of prescribed  controlled substance. Risks including addiction, benefits, and alternatives presented to patient.     Thyroid u/s 6/8/2017 showed stable nodules. Repeat u/s 2019 - no nodules seen. No need for routine follow-up     Follow-up in 6 months

## 2021-05-14 DIAGNOSIS — E11.65 UNCONTROLLED TYPE 2 DIABETES MELLITUS WITH HYPERGLYCEMIA (HCC): ICD-10-CM

## 2021-05-14 RX ORDER — PREGABALIN 300 MG/1
300 CAPSULE ORAL 2 TIMES DAILY
Qty: 60 CAPSULE | Refills: 3 | Status: SHIPPED | OUTPATIENT
Start: 2021-05-14 | End: 2021-05-18

## 2021-05-14 RX ORDER — CYANOCOBALAMIN 1000 UG/ML
1000 INJECTION, SOLUTION INTRAMUSCULAR; SUBCUTANEOUS
Qty: 3 ML | Refills: 3 | Status: SHIPPED | OUTPATIENT
Start: 2021-05-14 | End: 2021-05-18

## 2021-05-18 DIAGNOSIS — E11.65 UNCONTROLLED TYPE 2 DIABETES MELLITUS WITH HYPERGLYCEMIA (HCC): ICD-10-CM

## 2021-05-18 RX ORDER — PREGABALIN 300 MG/1
300 CAPSULE ORAL 2 TIMES DAILY
Qty: 180 CAPSULE | Refills: 0 | Status: SHIPPED | OUTPATIENT
Start: 2021-05-18 | End: 2021-06-01 | Stop reason: SDUPTHER

## 2021-05-18 RX ORDER — CYANOCOBALAMIN 1000 UG/ML
1000 INJECTION, SOLUTION INTRAMUSCULAR; SUBCUTANEOUS
Qty: 3 ML | Refills: 1 | Status: SHIPPED | OUTPATIENT
Start: 2021-05-18 | End: 2021-10-29

## 2021-05-18 RX ORDER — LEVOTHYROXINE SODIUM 0.15 MG/1
300 TABLET ORAL DAILY
Qty: 90 TABLET | Refills: 2 | Status: SHIPPED | OUTPATIENT
Start: 2021-05-18 | End: 2021-07-07

## 2021-05-18 NOTE — TELEPHONE ENCOUNTER
90 day Fax Rx  [facility] from [facility] for Synthroid, Pregabalin, syringes, b12 Last OV 3/19/21 future appt 7/2/21

## 2021-06-01 DIAGNOSIS — E11.65 UNCONTROLLED TYPE 2 DIABETES MELLITUS WITH HYPERGLYCEMIA (HCC): ICD-10-CM

## 2021-06-01 RX ORDER — PREGABALIN 300 MG/1
300 CAPSULE ORAL 2 TIMES DAILY
Qty: 180 CAPSULE | Refills: 0 | Status: SHIPPED | OUTPATIENT
Start: 2021-06-01 | End: 2021-08-30

## 2021-06-01 NOTE — TELEPHONE ENCOUNTER
Fax refill request from [facility] for Lyrica  RXX did not go through previously   No receipt confirmed

## 2021-07-02 ENCOUNTER — LAB (OUTPATIENT)
Dept: LAB | Facility: HOSPITAL | Age: 51
End: 2021-07-02

## 2021-07-02 ENCOUNTER — LAB (OUTPATIENT)
Dept: ENDOCRINOLOGY | Facility: CLINIC | Age: 51
End: 2021-07-02

## 2021-07-02 DIAGNOSIS — E03.9 ACQUIRED HYPOTHYROIDISM: ICD-10-CM

## 2021-07-02 LAB
T4 FREE SERPL-MCNC: 2.36 NG/DL (ref 0.93–1.7)
TSH SERPL DL<=0.05 MIU/L-ACNC: 0.01 UIU/ML (ref 0.27–4.2)

## 2021-07-02 PROCEDURE — 84443 ASSAY THYROID STIM HORMONE: CPT

## 2021-07-02 PROCEDURE — 84439 ASSAY OF FREE THYROXINE: CPT

## 2021-07-07 DIAGNOSIS — E55.9 VITAMIN D DEFICIENCY: ICD-10-CM

## 2021-07-07 DIAGNOSIS — E03.9 ACQUIRED HYPOTHYROIDISM: Primary | ICD-10-CM

## 2021-07-07 DIAGNOSIS — E11.42 TYPE 2 DIABETES MELLITUS WITH DIABETIC POLYNEUROPATHY, WITH LONG-TERM CURRENT USE OF INSULIN (HCC): ICD-10-CM

## 2021-07-07 DIAGNOSIS — Z79.4 TYPE 2 DIABETES MELLITUS WITH DIABETIC POLYNEUROPATHY, WITH LONG-TERM CURRENT USE OF INSULIN (HCC): ICD-10-CM

## 2021-07-07 RX ORDER — LEVOTHYROXINE SODIUM 0.2 MG/1
200 TABLET ORAL DAILY
Qty: 30 TABLET | Refills: 11 | Status: SHIPPED | OUTPATIENT
Start: 2021-07-07 | End: 2021-09-20 | Stop reason: SDUPTHER

## 2021-08-26 DIAGNOSIS — E11.65 UNCONTROLLED TYPE 2 DIABETES MELLITUS WITH HYPERGLYCEMIA (HCC): ICD-10-CM

## 2021-08-30 RX ORDER — PREGABALIN 300 MG/1
CAPSULE ORAL
Qty: 180 CAPSULE | Refills: 0 | Status: SHIPPED | OUTPATIENT
Start: 2021-08-30 | End: 2021-11-09 | Stop reason: SDUPTHER

## 2021-09-15 ENCOUNTER — LAB (OUTPATIENT)
Dept: LAB | Facility: HOSPITAL | Age: 51
End: 2021-09-15

## 2021-09-15 ENCOUNTER — OFFICE VISIT (OUTPATIENT)
Dept: ENDOCRINOLOGY | Facility: CLINIC | Age: 51
End: 2021-09-15

## 2021-09-15 VITALS
SYSTOLIC BLOOD PRESSURE: 128 MMHG | DIASTOLIC BLOOD PRESSURE: 80 MMHG | OXYGEN SATURATION: 97 % | HEIGHT: 64 IN | WEIGHT: 210.4 LBS | BODY MASS INDEX: 35.92 KG/M2 | HEART RATE: 82 BPM

## 2021-09-15 DIAGNOSIS — E11.42 TYPE 2 DIABETES MELLITUS WITH DIABETIC POLYNEUROPATHY, WITH LONG-TERM CURRENT USE OF INSULIN (HCC): ICD-10-CM

## 2021-09-15 DIAGNOSIS — Z79.4 TYPE 2 DIABETES MELLITUS WITH DIABETIC POLYNEUROPATHY, WITH LONG-TERM CURRENT USE OF INSULIN (HCC): ICD-10-CM

## 2021-09-15 DIAGNOSIS — E03.9 ACQUIRED HYPOTHYROIDISM: ICD-10-CM

## 2021-09-15 DIAGNOSIS — E55.9 VITAMIN D DEFICIENCY: ICD-10-CM

## 2021-09-15 DIAGNOSIS — E11.65 UNCONTROLLED TYPE 2 DIABETES MELLITUS WITH HYPERGLYCEMIA (HCC): Primary | ICD-10-CM

## 2021-09-15 LAB
EXPIRATION DATE: ABNORMAL
EXPIRATION DATE: NORMAL
GLUCOSE BLDC GLUCOMTR-MCNC: 131 MG/DL (ref 70–130)
HBA1C MFR BLD: 6.1 %
Lab: ABNORMAL
Lab: NORMAL

## 2021-09-15 PROCEDURE — 80053 COMPREHEN METABOLIC PANEL: CPT | Performed by: INTERNAL MEDICINE

## 2021-09-15 PROCEDURE — 82947 ASSAY GLUCOSE BLOOD QUANT: CPT | Performed by: INTERNAL MEDICINE

## 2021-09-15 PROCEDURE — 84443 ASSAY THYROID STIM HORMONE: CPT | Performed by: INTERNAL MEDICINE

## 2021-09-15 PROCEDURE — 84439 ASSAY OF FREE THYROXINE: CPT | Performed by: INTERNAL MEDICINE

## 2021-09-15 PROCEDURE — 99214 OFFICE O/P EST MOD 30 MIN: CPT | Performed by: INTERNAL MEDICINE

## 2021-09-15 PROCEDURE — 82306 VITAMIN D 25 HYDROXY: CPT | Performed by: INTERNAL MEDICINE

## 2021-09-15 RX ORDER — HYDROCODONE BITARTRATE AND ACETAMINOPHEN 5; 325 MG/1; MG/1
1 TABLET ORAL EVERY 6 HOURS PRN
COMMUNITY

## 2021-09-15 RX ORDER — GLIPIZIDE 5 MG/1
10 TABLET ORAL DAILY
Qty: 60 TABLET | Refills: 11 | Status: SHIPPED | OUTPATIENT
Start: 2021-09-15 | End: 2021-12-27

## 2021-09-15 RX ORDER — SEMAGLUTIDE 1.34 MG/ML
1 INJECTION, SOLUTION SUBCUTANEOUS WEEKLY
Qty: 6 PEN | Refills: 3 | Status: SHIPPED | OUTPATIENT
Start: 2021-09-15 | End: 2022-10-03

## 2021-09-15 NOTE — PROGRESS NOTES
Chief complaint  Diabetes (Follow Up) and Hypothyroidism    Subjective   Ann Galdamez is a 51 y.o. female is here today for follow-up.  Follow-up for DM type 2 dx 2012. After gastric sleeve surgery she has lost 35 lbs and came off all her diabetes medications, including insulin, victoza, metformin.   Complications: sx of neuropathy that persisted after diabetes controlled improved, neuropathy improved. She reported redness of the toes and tingling pain in the feet.  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  Takes Lyrica 300 mg BID.      Hypothyroidism dx 2006. In July 2014 [place] levothyroxine combo was transitioned to levothyroxine. Current dose was 250 mcg   TSH was 50 after gastric sleeve surgery and the levels improved on repeat testing. Last labs in 1/2019 - TSH of 8 and the dose increased to 275 mcg.   TSH was 91.9 and in August 2019 and we have transitioned to Tirosint 250 mcg daily.     Vit D deficiency - on supplements.     Medications    Current Outpatient Medications:   •  betamethasone dipropionate (DIPROLENE) 0.05 % ointment, APPLY TO AREAS ON TRUNK/EXTREMITIES 2X DAILY X3 WEEKS, THEN OFF 1 WK. NOT FOR FACE, PITS OR GROIN, Disp: , Rfl: 2  •  celecoxib (CeleBREX) 100 MG capsule, TAKE 1 CAPSULE BY MOUTH TWICE A DAY WITH FOOD, Disp: , Rfl: 2  •  CRANBERRY PO, Take 1 capsule by mouth daily., Disp: , Rfl:   •  cyanocobalamin 1000 MCG/ML injection, Inject 1 mL under the skin into the appropriate area as directed Every 28 (Twenty-Eight) Days., Disp: 3 mL, Rfl: 1  •  HYDROcodone-acetaminophen (NORCO) 5-325 MG per tablet, Take 1 tablet by mouth Every 6 (Six) Hours As Needed., Disp: , Rfl:   •  levothyroxine (SYNTHROID, LEVOTHROID) 200 MCG tablet, Take 1 tablet by mouth Daily., Disp: 30 tablet, Rfl: 11  •  losartan-hydrochlorothiazide (HYZAAR) 50-12.5 MG per tablet, , Disp: , Rfl:   •  omeprazole (priLOSEC) 20 MG capsule, Take 20 mg by mouth Daily., Disp: , Rfl: 5  •  pregabalin (LYRICA) 300 MG  "capsule, TAKE 1 CAPSULE TWICE A DAY, Disp: 180 capsule, Rfl: 0  •  Syringe/Needle, Disp, 25G X 1\" 3 ML misc, Injecting once monthly., Disp: 3 each, Rfl: 1  •  traZODone (DESYREL) 50 MG tablet, Take 1 tablet by mouth every night at bedtime., Disp: 90 tablet, Rfl: 3  •  vitamin D (ERGOCALCIFEROL) 1.25 MG (14010 UT) capsule capsule, Take 1 capsule by mouth Every 7 (Seven) Days., Disp: 12 capsule, Rfl: 3  •  glipizide (GLUCOTROL) 5 MG tablet, Take 2 tablets by mouth Daily. Start with 1 tab daily after steroid injection, may increase to 2 tab daily if glucose above the goal, Disp: 60 tablet, Rfl: 11  •  Semaglutide, 1 MG/DOSE, (Ozempic, 1 MG/DOSE,) 2 MG/1.5ML solution pen-injector, Inject 1 mg under the skin into the appropriate area as directed 1 (One) Time Per Week., Disp: 6 pen, Rfl: 3    Current Facility-Administered Medications:   •  cyanocobalamin injection 1,000 mcg, 1,000 mcg, Intramuscular, Q28 Days, Bree Velasco MD      Review of systems  Review of Systems   Constitutional: Positive for unexpected weight change (weight gain). Negative for activity change, appetite change, chills and diaphoresis.   HENT: Negative for congestion, ear pain, facial swelling, hearing loss, postnasal drip, sore throat, trouble swallowing and voice change.    Eyes: Negative.  Negative for redness, itching and visual disturbance.   Respiratory: Negative for cough and shortness of breath.    Cardiovascular: Negative for palpitations and leg swelling.   Gastrointestinal: Negative for abdominal distention, abdominal pain, constipation, diarrhea, nausea and vomiting.   Endocrine:        As listed in HPI   Genitourinary: Negative.    Musculoskeletal: Positive for arthralgias. Negative for back pain, joint swelling, myalgias, neck pain and neck stiffness.   Skin: Positive for rash.   Allergic/Immunologic: Negative.    Neurological: Positive for numbness ( ). Negative for syncope and light-headedness.   Hematological: Negative.  " "  Psychiatric/Behavioral: Negative.    All other systems reviewed and are negative.      Physical exam  Objective   Blood pressure 128/80, pulse 82, height 162.6 cm (64\"), weight 95.4 kg (210 lb 6.4 oz), SpO2 97 %.   Physical Exam   Constitutional: She is oriented to person, place, and time. She appears well-developed.   HENT:   Head: Normocephalic and atraumatic.   Eyes: Conjunctivae are normal.   Neck: No thyromegaly present.   The neck is supple and symmetric   Cardiovascular: Normal rate, regular rhythm and normal heart sounds.   Pulmonary/Chest: Effort normal and breath sounds normal.   Musculoskeletal: Normal range of motion.   Lymphadenopathy:     She has no cervical adenopathy.   Neurological: She is alert and oriented to person, place, and time. She has normal reflexes.   Psychiatric: Thought content normal.   Vitals reviewed.      Results for orders placed or performed in visit on 09/15/21   Vitamin D 25 Hydroxy    Specimen: Blood   Result Value Ref Range    25 Hydroxy, Vitamin D 28.8 ng/ml   Comprehensive Metabolic Panel    Specimen: Blood   Result Value Ref Range    Glucose 81 65 - 99 mg/dL    BUN 41 (H) 6 - 20 mg/dL    Creatinine 1.08 (H) 0.57 - 1.00 mg/dL    Sodium 139 136 - 145 mmol/L    Potassium 4.2 3.5 - 5.2 mmol/L    Chloride 105 98 - 107 mmol/L    CO2 25.5 22.0 - 29.0 mmol/L    Calcium 9.2 8.6 - 10.5 mg/dL    Total Protein 6.6 6.0 - 8.5 g/dL    Albumin 4.00 3.50 - 5.20 g/dL    ALT (SGPT) 7 1 - 33 U/L    AST (SGOT) 11 1 - 32 U/L    Alkaline Phosphatase 61 39 - 117 U/L    Total Bilirubin <0.2 0.0 - 1.2 mg/dL    eGFR Non African Amer 53 (L) >60 mL/min/1.73    Globulin 2.6 gm/dL    A/G Ratio 1.5 g/dL    BUN/Creatinine Ratio 38.0 (H) 7.0 - 25.0    Anion Gap 8.5 5.0 - 15.0 mmol/L   T4, Free    Specimen: Blood   Result Value Ref Range    Free T4 1.68 0.93 - 1.70 ng/dL   TSH    Specimen: Blood   Result Value Ref Range    TSH 0.019 (L) 0.270 - 4.200 uIU/mL   POC Glycosylated Hemoglobin (Hb A1C)    " Specimen: Blood   Result Value Ref Range    Hemoglobin A1C 6.1 %    Lot Number [id number]     Expiration Date 05/11/2023    POC Glucose, Blood    Specimen: Blood   Result Value Ref Range    Glucose 131 (A) 70 - 130 mg/dL    Lot Number [id number]     Expiration Date 04/14/2022      Lab Results   Component Value Date    HGBA1C 6.1 09/15/2021    HGBA1C 7.7 03/19/2021    HGBA1C 5.7 11/15/2019     Lab Results   Component Value Date    MICROALBUR 5.0 05/22/2018    CREATININE 1.08 (H) 09/15/2021       Assessment  Assessment/Plan   Problems Addressed this Visit        Other    Diabetes mellitus with diabetic polyneuropathy, with long-term current use of insulin (CMS/[facility])    Relevant Medications    Semaglutide, 1 MG/DOSE, (Ozempic, 1 MG/DOSE,) 2 MG/1.5ML solution pen-injector    glipizide (GLUCOTROL) 5 MG tablet    Hypothyroidism    Type 2 diabetes mellitus, uncontrolled (CMS/[facility]) - Primary    Relevant Medications    Semaglutide, 1 MG/DOSE, (Ozempic, 1 MG/DOSE,) 2 MG/1.5ML solution pen-injector    glipizide (GLUCOTROL) 5 MG tablet    Other Relevant Orders    POC Glycosylated Hemoglobin (Hb A1C) (Completed)    POC Glucose, Blood (Completed)    Vitamin D deficiency      Diagnoses       Codes Comments    Uncontrolled type 2 diabetes mellitus with hyperglycemia (CMS/[facility])    -  Primary ICD-10-CM: E11.65  ICD-9-CM: 250.02     Vitamin D deficiency     ICD-10-CM: E55.9  ICD-9-CM: 268.9     Type 2 diabetes mellitus with diabetic polyneuropathy, with long-term current use of insulin (CMS/[facility])     ICD-10-CM: E11.42, Z79.4  ICD-9-CM: 250.60, 357.2, V58.67     Acquired hypothyroidism     ICD-10-CM: E03.9  ICD-9-CM: 244.9           Plan  1. Levothyroxine 200 mcg. Thyroid function is still high . Reduce the dose to  175 mcg    2. Diabetes mellitus type 2 with worsening control.   -Victoza changed to Ozempic.    -Continue with diet. A1C is well controlled.     3. Vitamin D deficiency -  Cont supplements. Vit D level is normal.     Follow-up  in 6 months

## 2021-09-16 LAB
25(OH)D3 SERPL-MCNC: 28.8 NG/ML
ALBUMIN SERPL-MCNC: 4 G/DL (ref 3.5–5.2)
ALBUMIN/GLOB SERPL: 1.5 G/DL
ALP SERPL-CCNC: 61 U/L (ref 39–117)
ALT SERPL W P-5'-P-CCNC: 7 U/L (ref 1–33)
ANION GAP SERPL CALCULATED.3IONS-SCNC: 8.5 MMOL/L (ref 5–15)
AST SERPL-CCNC: 11 U/L (ref 1–32)
BILIRUB SERPL-MCNC: <0.2 MG/DL (ref 0–1.2)
BUN SERPL-MCNC: 41 MG/DL (ref 6–20)
BUN/CREAT SERPL: 38 (ref 7–25)
CALCIUM SPEC-SCNC: 9.2 MG/DL (ref 8.6–10.5)
CHLORIDE SERPL-SCNC: 105 MMOL/L (ref 98–107)
CO2 SERPL-SCNC: 25.5 MMOL/L (ref 22–29)
CREAT SERPL-MCNC: 1.08 MG/DL (ref 0.57–1)
GFR SERPL CREATININE-BSD FRML MDRD: 53 ML/MIN/1.73
GLOBULIN UR ELPH-MCNC: 2.6 GM/DL
GLUCOSE SERPL-MCNC: 81 MG/DL (ref 65–99)
POTASSIUM SERPL-SCNC: 4.2 MMOL/L (ref 3.5–5.2)
PROT SERPL-MCNC: 6.6 G/DL (ref 6–8.5)
SODIUM SERPL-SCNC: 139 MMOL/L (ref 136–145)
T4 FREE SERPL-MCNC: 1.68 NG/DL (ref 0.93–1.7)
TSH SERPL DL<=0.05 MIU/L-ACNC: 0.02 UIU/ML (ref 0.27–4.2)

## 2021-09-20 RX ORDER — LEVOTHYROXINE SODIUM 175 UG/1
175 TABLET ORAL DAILY
Qty: 30 TABLET | Refills: 6 | Status: SHIPPED | OUTPATIENT
Start: 2021-09-20 | End: 2022-03-01

## 2021-10-29 RX ORDER — CYANOCOBALAMIN 1000 UG/ML
INJECTION, SOLUTION INTRAMUSCULAR; SUBCUTANEOUS
Qty: 3 ML | Refills: 0 | Status: SHIPPED | OUTPATIENT
Start: 2021-10-29 | End: 2022-01-21

## 2021-11-07 ENCOUNTER — HOSPITAL ENCOUNTER (EMERGENCY)
Age: 51
Discharge: HOME | End: 2021-11-07
Payer: COMMERCIAL

## 2021-11-07 VITALS
OXYGEN SATURATION: 95 % | HEART RATE: 68 BPM | SYSTOLIC BLOOD PRESSURE: 114 MMHG | TEMPERATURE: 97.88 F | DIASTOLIC BLOOD PRESSURE: 62 MMHG | RESPIRATION RATE: 20 BRPM

## 2021-11-07 VITALS
SYSTOLIC BLOOD PRESSURE: 114 MMHG | RESPIRATION RATE: 20 BRPM | HEART RATE: 68 BPM | TEMPERATURE: 98 F | DIASTOLIC BLOOD PRESSURE: 62 MMHG

## 2021-11-07 VITALS — BODY MASS INDEX: 35.2 KG/M2

## 2021-11-07 DIAGNOSIS — E11.9: ICD-10-CM

## 2021-11-07 DIAGNOSIS — J02.0: Primary | ICD-10-CM

## 2021-11-07 DIAGNOSIS — J20.9: ICD-10-CM

## 2021-11-07 PROCEDURE — U0003 INFECTIOUS AGENT DETECTION BY NUCLEIC ACID (DNA OR RNA); SEVERE ACUTE RESPIRATORY SYNDROME CORONAVIRUS 2 (SARS-COV-2) (CORONAVIRUS DISEASE [COVID-19]), AMPLIFIED PROBE TECHNIQUE, MAKING USE OF HIGH THROUGHPUT TECHNOLOGIES AS DESCRIBED BY CMS-2020-01-R: HCPCS

## 2021-11-07 PROCEDURE — C9803 HOPD COVID-19 SPEC COLLECT: HCPCS

## 2021-11-07 PROCEDURE — G0463 HOSPITAL OUTPT CLINIC VISIT: HCPCS

## 2021-11-07 PROCEDURE — U0005 INFEC AGEN DETEC AMPLI PROBE: HCPCS

## 2021-11-07 PROCEDURE — 99202 OFFICE O/P NEW SF 15 MIN: CPT

## 2021-11-07 PROCEDURE — 87880 STREP A ASSAY W/OPTIC: CPT

## 2021-11-07 PROCEDURE — 96372 THER/PROPH/DIAG INJ SC/IM: CPT

## 2021-11-09 DIAGNOSIS — E11.65 UNCONTROLLED TYPE 2 DIABETES MELLITUS WITH HYPERGLYCEMIA (HCC): ICD-10-CM

## 2021-11-09 RX ORDER — PREGABALIN 300 MG/1
300 CAPSULE ORAL 2 TIMES DAILY
Qty: 180 CAPSULE | Refills: 0 | Status: SHIPPED | OUTPATIENT
Start: 2021-11-09 | End: 2022-02-16 | Stop reason: SDUPTHER

## 2021-12-14 ENCOUNTER — OFFICE VISIT (OUTPATIENT)
Dept: OBSTETRICS AND GYNECOLOGY | Facility: CLINIC | Age: 51
End: 2021-12-14

## 2021-12-14 VITALS — DIASTOLIC BLOOD PRESSURE: 80 MMHG | SYSTOLIC BLOOD PRESSURE: 124 MMHG | BODY MASS INDEX: 36.39 KG/M2 | WEIGHT: 212 LBS

## 2021-12-14 DIAGNOSIS — Z76.89 ENCOUNTER FOR BIOPSY: ICD-10-CM

## 2021-12-14 DIAGNOSIS — N95.0 PMB (POSTMENOPAUSAL BLEEDING): Primary | ICD-10-CM

## 2021-12-14 PROCEDURE — 99213 OFFICE O/P EST LOW 20 MIN: CPT | Performed by: NURSE PRACTITIONER

## 2021-12-14 PROCEDURE — 58100 BIOPSY OF UTERUS LINING: CPT | Performed by: NURSE PRACTITIONER

## 2021-12-14 NOTE — PROGRESS NOTES
Chief Complaint   Patient presents with   • Follow-up     abnormal bleeding            HPI  Ann Galdamez is a 51 y.o. female, , who presents with initial evaluation of Abnormal Uterine Bleeding      Her last LMP was Patient is post menopausal.  Periods are absent . Patient states the last 2 months she had some bleeding/ spotting consecutively. She also has some dime size clotting and mild cramping.    She states she has experienced this problem for 2 months. She states that the problem is stable.  The patient reports additional symptoms as cramping.  The patient has been evaluated: US.     Did the patient have u/s today? Yes.  Findings showed EMC= 9.8mm.  I have personally evaluated the U/S and agree with the findings. KEYON Moore    Additional OB/GYN History   Last Pap : 2019 negative  Last Completed Pap Smear     This patient has no relevant Health Maintenance data.        History of abnormal Pap smear: no  Tobacco Usage?: No     The additional following portions of the patient's history were reviewed and updated as appropriate: allergies, current medications, past family history, past medical history, past social history and past surgical history.    Review of Systems   Constitutional: Negative.    Cardiovascular: Negative.    Gastrointestinal: Negative.    Genitourinary: Positive for pelvic pain ( mild cramping) and vaginal bleeding.     All other systems reviewed and are negative.     I have reviewed and agree with the HPI, ROS, and historical information as entered above. KEYON Moore    Objective   /80   Wt 96.2 kg (212 lb)   BMI 36.39 kg/m²     Physical Exam  Vitals and nursing note reviewed. Exam conducted with a chaperone present.   Constitutional:       Appearance: Normal appearance. She is obese.   Cardiovascular:      Rate and Rhythm: Normal rate and regular rhythm.   Abdominal:      Palpations: Abdomen is soft.      Tenderness: There is no abdominal  tenderness.   Genitourinary:     General: Normal vulva.      Vagina: Bleeding ( mild bleeding present) present.      Cervix: Cervical bleeding present.      Uterus: Normal.       Adnexa: Right adnexa normal and left adnexa normal.      Rectum: Normal.   Neurological:      Mental Status: She is alert.            Assessment and Plan    Problem List Items Addressed This Visit     None      Visit Diagnoses     PMB (postmenopausal bleeding)    -  Primary    Relevant Orders    US Non-ob Transvaginal    Tissue Pathology Exam    Encounter for biopsy        Relevant Orders    Tissue Pathology Exam          1. Postmenopausal bleeding  2. Endometrial biopsy today   3. Ultrasound showed EMC= 9.8mm  Plan:   Will notify pt. Of endometrial biopsy results.   Call for increased pain or heavy bleeding.     China Enciso, APRN  12/14/2021

## 2021-12-14 NOTE — PROGRESS NOTES
Chief Complaint   Patient presents with   • Follow-up     abnormal bleeding            HPI  Ann Galdamez is a 51 y.o. female, , who presents with initial evaluation of Abnormal Uterine Bleeding      Her last LMP was No LMP recorded. Patient is perimenopausal..  Periods are absent . Patient states the last 2 months she had some bleeding/ spotting consecutively. She states she also has some dime size clotting and some mild cramping.     She states she has experienced this problem for 2 months. She states that the problem is stable.  The patient reports additional symptoms as cramping.  The patient has been evaluated: US.     Did the patient have u/s today? Yes.  Findings showed ***.  I have personally evaluated the U/S and agree with the findings. Dejah Campoverde MA    Additional OB/GYN History   Last Pap :   Last Completed Pap Smear     This patient has no relevant Health Maintenance data.        History of abnormal Pap smear: no  Tobacco Usage?: No     The additional following portions of the patient's history were reviewed and updated as appropriate: allergies, current medications, past family history, past medical history, past social history and past surgical history.    Review of Systems  All other systems reviewed and are negative.     I have reviewed and agree with the HPI, ROS, and historical information as entered above. Dejah Campoverde MA    Objective   /80   Wt 96.2 kg (212 lb)   BMI 36.39 kg/m²     Physical Exam       Assessment and Plan    Problem List Items Addressed This Visit     None      Visit Diagnoses     PMB (postmenopausal bleeding)    -  Primary    Relevant Orders    US Non-ob Transvaginal    Tissue Pathology Exam    Encounter for biopsy        Relevant Orders    Tissue Pathology Exam          1. {AUB Plan:86112}    Dejah Campoverde MA  2021

## 2021-12-14 NOTE — PROGRESS NOTES
Endometrial Biopsy    Ann Galdamez is a 51 y.o. female,   , whose last menstrual period was No LMP recorded. Patient is perimenopausal..  The patient has a history of post menopausal bleeding and presents for an endometrial biopsy.  Patient has had light bleeding on and off for the past 2 months. Prior to this she had not had any bleeding for over 2 years. .  After the indications, risks, benefits, and alternatives to performing and endometrial biopsy were explained to the patient..  A urine pregnancy test was not indicated due to patient being postmenopausal.    PROCEDURE:  The patient was placed on the table in the supine lithotomy position.  She was draped in the appropriate manner.  A speculum was placed in the vagina.  The cervix was visualized and prepped with Betadine. A tenaculum was not used. A small plastic 5 mm Pipelle syringe curette was inserted into the cervical canal.  The uterus was sounded to 6 cms.  A vigorous four quadrant biopsy was performed, removing a moderate amount of tissue.  This tissue was placed in Formalin and sent to pathology.  The patient tolerated the procedure well and reported Mild cramping.  She had No cramping at the time of discharge.  Physical Exam  Vitals and nursing note reviewed. Exam conducted with a chaperone present.   Constitutional:       Appearance: Normal appearance. She is obese.   Abdominal:      Palpations: Abdomen is soft.      Tenderness: There is no abdominal tenderness.   Genitourinary:     General: Normal vulva.      Vagina: Bleeding (mid bleeding present) present.      Cervix: Cervical bleeding present.      Uterus: Normal.       Adnexa: Right adnexa normal and left adnexa normal.      Rectum: Normal.   Neurological:      Mental Status: She is alert.         Procedures    Review of Systems   Constitutional: Negative.    Respiratory: Negative.    Gastrointestinal: Negative.    Genitourinary: Positive for pelvic pain and vaginal bleeding.    Allergic/Immunologic: Negative.    Psychiatric/Behavioral: Negative.          Plan:  Orders Placed This Encounter   Procedures   • US Non-ob Transvaginal     Standing Status:   Future     Number of Occurrences:   1     Standing Expiration Date:   12/14/2022     Order Specific Question:   Reason for Exam:     Answer:    Bleeding, US Non-ob Transvaginal       Problem List Items Addressed This Visit     None      Visit Diagnoses     PMB (postmenopausal bleeding)    -  Primary    Relevant Orders    US Non-ob Transvaginal    Tissue Pathology Exam    Encounter for biopsy        Relevant Orders    Tissue Pathology Exam              Instructions  1. Call the office in 5 business days for biopsy results.  2. Patient instructed to call the office if develops a fever of 100.4 or greater, vaginal bleeding heavier than a period, foul vaginal discharge or pain.      China Enciso, APRN

## 2021-12-15 RX ORDER — ERGOCALCIFEROL 1.25 MG/1
CAPSULE ORAL
Qty: 12 CAPSULE | Refills: 3 | Status: SHIPPED | OUTPATIENT
Start: 2021-12-15 | End: 2022-02-16 | Stop reason: SDUPTHER

## 2021-12-21 ENCOUNTER — TELEPHONE (OUTPATIENT)
Dept: OBSTETRICS AND GYNECOLOGY | Facility: CLINIC | Age: 51
End: 2021-12-21

## 2021-12-21 DIAGNOSIS — Z76.89 ENCOUNTER FOR BIOPSY: ICD-10-CM

## 2021-12-21 DIAGNOSIS — N95.0 PMB (POSTMENOPAUSAL BLEEDING): ICD-10-CM

## 2021-12-21 DIAGNOSIS — C54.1 ENDOMETRIAL ADENOCARCINOMA (HCC): Primary | ICD-10-CM

## 2021-12-21 NOTE — TELEPHONE ENCOUNTER
I have notified patient of recent EMB pathology of endometrial adenocarcinoma, FIGO grade 2. She was seen for PMB on 12/14/21 and EMB was performed.    Plan: Pt. Referred to  or Dr. Liz for recent diagnosis of endometrial adenocarcinoma.

## 2021-12-23 ENCOUNTER — OFFICE VISIT (OUTPATIENT)
Dept: GYNECOLOGIC ONCOLOGY | Facility: CLINIC | Age: 51
End: 2021-12-23

## 2021-12-23 VITALS
HEART RATE: 74 BPM | WEIGHT: 202.6 LBS | TEMPERATURE: 97.3 F | SYSTOLIC BLOOD PRESSURE: 133 MMHG | RESPIRATION RATE: 16 BRPM | BODY MASS INDEX: 34.59 KG/M2 | DIASTOLIC BLOOD PRESSURE: 69 MMHG | HEIGHT: 64 IN

## 2021-12-23 DIAGNOSIS — E03.9 ACQUIRED HYPOTHYROIDISM: ICD-10-CM

## 2021-12-23 DIAGNOSIS — F11.90 OPIATE USE: ICD-10-CM

## 2021-12-23 DIAGNOSIS — G89.29 OTHER CHRONIC PAIN: ICD-10-CM

## 2021-12-23 DIAGNOSIS — E11.40 TYPE 2 DIABETES MELLITUS WITH DIABETIC NEUROPATHY, WITHOUT LONG-TERM CURRENT USE OF INSULIN (HCC): ICD-10-CM

## 2021-12-23 DIAGNOSIS — C54.1 ENDOMETRIAL CANCER (HCC): Primary | ICD-10-CM

## 2021-12-23 DIAGNOSIS — I10 PRIMARY HYPERTENSION: ICD-10-CM

## 2021-12-23 PROCEDURE — 99205 OFFICE O/P NEW HI 60 MIN: CPT | Performed by: OBSTETRICS & GYNECOLOGY

## 2021-12-23 RX ORDER — ACETAMINOPHEN 325 MG/1
1000 TABLET ORAL ONCE
Status: CANCELLED | OUTPATIENT
Start: 2021-12-23 | End: 2021-12-23

## 2021-12-23 RX ORDER — OXYCODONE HYDROCHLORIDE 5 MG/1
5 TABLET ORAL ONCE
Status: CANCELLED | OUTPATIENT
Start: 2021-12-23 | End: 2021-12-23

## 2021-12-23 RX ORDER — GABAPENTIN 100 MG/1
300 CAPSULE ORAL ONCE
Status: CANCELLED | OUTPATIENT
Start: 2021-12-23 | End: 2021-12-23

## 2021-12-23 RX ORDER — SODIUM CHLORIDE, SODIUM LACTATE, POTASSIUM CHLORIDE, CALCIUM CHLORIDE 600; 310; 30; 20 MG/100ML; MG/100ML; MG/100ML; MG/100ML
100 INJECTION, SOLUTION INTRAVENOUS CONTINUOUS
Status: CANCELLED | OUTPATIENT
Start: 2021-12-23

## 2021-12-23 RX ORDER — HEPARIN SODIUM 5000 [USP'U]/ML
5000 INJECTION, SOLUTION INTRAVENOUS; SUBCUTANEOUS ONCE
Status: CANCELLED | OUTPATIENT
Start: 2021-12-23 | End: 2021-12-23

## 2021-12-23 NOTE — PROGRESS NOTES
New Patient Office Visit      Patient Name: Ann Galdamez  : 1970   MRN: [id number]     Referring Physician: China Enciso APRN    Chief Complaint:    Chief Complaint   Patient presents with   • Uterine Cancer       History of Present Illness: Ann Galdamez is a 51 y.o. female who is here today as a consultation with gynecologic oncology for a recent diagnosis of endometrial cancer. The patient presented to her OB/GYN office with complaints of postmenopausal bleeding for the past 2 months.  She reported mild cramping with the bleeding.  She did not require any pain medication for cramping.  Today she denies any pain.  She had an ultrasound in the office that demonstrated a thickened endometrial stripe and subsequently underwent an endometrial biopsy which demonstrated the aforementioned diagnosis.  She does report lower back pain that she rates a 6 out of 10.  She is on [place] from Dr. Brewster with pain management.    Oncologic History:  Oncology/Hematology History   Endometrial cancer (HCC)   2021 Initial Diagnosis    Referral [facility]    2021: TVUS with uterus measuring 6.0 x 3.7 x 3.7 cm with an endometrial stripe of 9.8 mm.  Cervix with multiple echogenic foci consistent with possible nabothian cyst.  Right and left ovaries normal.  Small amount of free fluid.  2021: EMB with FIGO grade 2 endometrioid adenocarcinoma          Past Medical History:   Past Medical History:   Diagnosis Date   • Chronic diarrhea of unknown origin    • Endometrial cancer (HCC)    • Hyperlipidemia    • Hypertension    • Hypothyroidism    • Psoriasis    • Type 2 diabetes mellitus (HCC)        Past Surgical History:   Past Surgical History:   Procedure Laterality Date   •  SECTION     • CHOLECYSTECTOMY     • GASTRIC SLEEVE LAPAROSCOPIC  10/01/2018   • TONSILLECTOMY         Family History:   Family History   Problem Relation Age of Onset   • Arthritis Other    • Depression Other     • Diabetes Other    • Heart disease Other    • Hyperlipidemia Other    • Hypertension Other    • Other Other         Polyps of the sigmoid colon   • Thyroid disease Other    • Uterine cancer Other    • Ovarian cancer Mother 39   • Breast cancer Paternal Uncle         60's   • Breast cancer Maternal Aunt    • Breast cancer Maternal Uncle    • Colon cancer Neg Hx    • Melanoma Neg Hx    • Prostate cancer Neg Hx        Social History:   Social History     Socioeconomic History   • Marital status:    Tobacco Use   • Smoking status: Former Smoker     Quit date: 2010     Years since quittin.9   • Smokeless tobacco: Never Used   Substance and Sexual Activity   • Alcohol use: Yes     Alcohol/week: 1.0 standard drink     Types: 1 Cans of beer per week   • Drug use: Never   • Sexual activity: Yes     Partners: Male     Birth control/protection: Post-menopausal       Past OB/GYN History:   OB History    Para Term  AB Living   2 2 2         SAB IAB Ectopic Molar Multiple Live Births                    # Outcome Date GA Lbr Garo/2nd Weight Sex Delivery Anes PTL Lv   2 Term            1 Term            2 prior  sections - denies any complications  Menopause in late 40s  Denies any history of abnormal pap tests  Denies any history of STIs    Health Maintenance:   Mammogram: Date: 2019 Results: benign  Colonoscopy: Date: ~5 years ago, Results: benign    Medications:     Current Outpatient Medications:   •  betamethasone dipropionate (DIPROLENE) 0.05 % ointment, APPLY TO AREAS ON TRUNK/EXTREMITIES 2X DAILY X3 WEEKS, THEN OFF 1 WK. NOT FOR FACE, PITS OR GROIN, Disp: , Rfl: 2  •  CRANBERRY PO, Take 1 capsule by mouth daily., Disp: , Rfl:   •  cyanocobalamin 1000 MCG/ML injection, INJECT 1 ML UNDER THE SKIN INTO THE APPROPRIATE AREA AS DIRECTED EVERY 28 DAYS, Disp: 3 mL, Rfl: 0  •  glipizide (GLUCOTROL) 5 MG tablet, Take 2 tablets by mouth Daily. Start with 1 tab daily after steroid injection,  "may increase to 2 tab daily if glucose above the goal, Disp: 60 tablet, Rfl: 11  •  HYDROcodone-acetaminophen (NORCO) 5-325 MG per tablet, Take 1 tablet by mouth Every 6 (Six) Hours As Needed., Disp: , Rfl:   •  levothyroxine (SYNTHROID, LEVOTHROID) 175 MCG tablet, Take 1 tablet by mouth Daily., Disp: 30 tablet, Rfl: 6  •  losartan-hydrochlorothiazide (HYZAAR) 50-12.5 MG per tablet, , Disp: , Rfl:   •  omeprazole (priLOSEC) 20 MG capsule, Take 20 mg by mouth Daily., Disp: , Rfl: 5  •  pregabalin (LYRICA) 300 MG capsule, Take 1 capsule by mouth 2 (Two) Times a Day., Disp: 180 capsule, Rfl: 0  •  Semaglutide, 1 MG/DOSE, (Ozempic, 1 MG/DOSE,) 2 MG/1.5ML solution pen-injector, Inject 1 mg under the skin into the appropriate area as directed 1 (One) Time Per Week., Disp: 6 pen, Rfl: 3  •  Syringe/Needle, Disp, 25G X 1\" 3 ML misc, Injecting once monthly., Disp: 3 each, Rfl: 1  •  traZODone (DESYREL) 50 MG tablet, Take 1 tablet by mouth every night at bedtime., Disp: 90 tablet, Rfl: 3  •  vitamin D (ERGOCALCIFEROL) 1.25 MG (42608 UT) capsule capsule, TAKE 1 CAPSULE EVERY 7 DAYS, Disp: 12 capsule, Rfl: 3    Allergies:   No Known Allergies    Review of Systems:   Review of Systems   Constitutional: Negative for activity change, appetite change, chills, fatigue and unexpected weight change (Intentional weight loss, s/p bariatric surgery).   HENT: Negative for congestion and sore throat.    Respiratory: Negative for cough, shortness of breath and wheezing.    Cardiovascular: Negative for chest pain, palpitations and leg swelling.   Gastrointestinal: Negative for abdominal distention, abdominal pain, constipation, diarrhea, nausea and vomiting.   Genitourinary: Positive for vaginal bleeding. Negative for dyspareunia, dysuria, flank pain, frequency, pelvic pain and urgency.   Musculoskeletal: Positive for arthralgias and back pain.   Neurological: Negative for dizziness, light-headedness, numbness and headaches.   Hematological: " "Negative for adenopathy.   Psychiatric/Behavioral: Positive for dysphoric mood. The patient is nervous/anxious.         Objective     Physical Exam:  Vital Signs:   Vitals:    12/23/21 1437   BP: 133/69   Pulse: 74   Resp: 16   Temp: 97.3 °F (36.3 °C)   TempSrc: Temporal   Weight: 91.9 kg (202 lb 9.6 oz)   Height: 162.6 cm (64.02\")   PainSc:   6     BMI: Body mass index is 34.76 kg/m².   ECOG score: 0           PHQ-2 Depression Screening  Little interest or pleasure in doing things? 0   Feeling down, depressed, or hopeless? 1   PHQ-2 Total Score 1     Physical Exam  Vitals and nursing note reviewed. Exam conducted with a chaperone present.   Constitutional:       General: She is not in acute distress.     Appearance: Normal appearance. She is well-developed. She is not diaphoretic.   HENT:      Head: Normocephalic and atraumatic.      Right Ear: External ear normal.      Left Ear: External ear normal.   Eyes:      General: No scleral icterus.        Right eye: No discharge.         Left eye: No discharge.      Conjunctiva/sclera: Conjunctivae normal.   Neck:      Thyroid: No thyromegaly.   Cardiovascular:      Rate and Rhythm: Normal rate and regular rhythm.      Heart sounds: No murmur heard.      Pulmonary:      Effort: Pulmonary effort is normal. No respiratory distress.      Breath sounds: Normal breath sounds. No wheezing.   Abdominal:      General: There is no distension.      Palpations: Abdomen is soft. There is no mass.      Tenderness: There is no abdominal tenderness. There is no guarding.      Hernia: No hernia is present.      Comments: Laparoscopic incisions well healed   Genitourinary:     Comments: External genitalia normal. Vagina without discharge. Cervix normal. Uterus normal and mobile. No adnexal masses. Rectovaginal exam deferred  Musculoskeletal:         General: No swelling, tenderness, deformity or signs of injury.      Right lower leg: No edema.      Left lower leg: No edema. "   Lymphadenopathy:      Cervical: No cervical adenopathy.   Skin:     General: Skin is warm and dry.      Findings: No erythema, lesion or rash.   Neurological:      General: No focal deficit present.      Mental Status: She is alert and oriented to person, place, and time. Mental status is at baseline.   Psychiatric:         Behavior: Behavior normal.         Thought Content: Thought content normal.     Imagin2021: TVUS with uterus measuring 6.0 x 3.7 x 3.7 cm with an endometrial stripe of 9.8 mm.  Cervix with multiple echogenic foci consistent with possible nabothian cyst.  Right and left ovaries normal.  Small amount of free fluid.    Imaging reviewed by me. Thickened EMS with small amount of free fluid which could be physiologic. View of ovaries suboptimal.    Pathology:  2021: EMB with FIGO grade 2 endometrioid adenocarcinoma    Assessment / Plan    Ann Galdamez is a 51 y.o. female who is recently diagnosed with FIGO grade 2 endometrial cancer. Notes from Ms. Enciso reviewed as were pathology report and ultrasound images. We discussed surgical management via robotic hysterectomy, bilateral salpingo-oophorectomy with sentinel lymph node evaluation +/- complete lymphadenectomy as indicated. The patient was also counseled extensively on the nature of endometrial cancer and the fact that a majority are found in the early stages. However, if more advanced disease is encountered, she may require chemotherapy, radiation or a combination of the two. We also discussed risks of the procedure including bleeding, infection, wound breakdown, blood clots, injury to surrounding organs including the bowel, bladder, blood vessels, nerves, and ureters requiring additional intervention or procedures. We also discussed the risk of development of lymphedema particularly if a complete lymphadenectomy is needed. and the need for a laparotomy if the surgery cannot be performed safely via the minimally invasive  approach. We also discussed the anticipated hospital stay and recovery time. All of the patient's questions were answered satisfactorily and verbal consent was obtained.    Type 2 DM: Patient reports glucose control improved following weight loss. Currently on glipizide (okay to take the day before surgery) and semaglutide (okay to take the day of surgery). HgbA1C ordered.  Chronic pain: Currently on norco 5-325 mg PRN from Dr. Damon Brewster. Office contacted to let them know about plan for surgery and possible need for additional opiates postoperatively. Also taking pregabalin.  Hypothyroid: Continue levothyroxine 175 mcg perioperatively.   Hypertension: BP appears to be well controlled. Hold losartan-HCTZ on day of surgery.    Pain assessment was performed today as a part of patient’s care.  For patients with pain related to surgery, gynecologic malignancy or cancer treatment, the plan is as noted in the assessment/plan.  For patients with pain not related to these issues, they are to seek any further needed care from a more appropriate provider, such as PCP.          Diagnoses and all orders for this visit:    1. Endometrial cancer (HCC) (Primary)  -     Case Request; Standing  -     COVID PRE-OP / PRE-PROCEDURE SCREENING ORDER (NO ISOLATION) - Swab, Nasopharynx; Future  -     CBC and Differential; Future  -     Comprehensive metabolic panel; Future  -     Type and screen; Future  -     ECG 12 Lead; Future  -     XR chest 1 vw; Future  -     Hemoglobin A1c; Future  -     Case Request    2. Type 2 diabetes mellitus with diabetic neuropathy, without long-term current use of insulin (HCC)  -     Hemoglobin A1c; Future    3. Other chronic pain    4. Opiate use    5. Acquired hypothyroidism    6. Primary hypertension  -     ECG 12 Lead; Future    Other orders  -     Follow anesthesia standing orders.; Future  -     Obtain informed consent  -     Provide NPO Instructions to Patient; Future  -     Chlorhexidine Skin  Prep; Future         Follow Up: Surgery    BRANDIE Liz MD  Gynecologic Oncology

## 2021-12-23 NOTE — PATIENT INSTRUCTIONS
"              Laparoscopic Surgery Instructions            Ann Galdamez  [id number]  1970      SURGEON:  Liane Liz MD    Surgery Coordinator: Jessie   If you have any questions before or after surgery please call.  [phone]        Appointment      2. You have pre-admission testing (PAT) appointment on 12/27/2021 at 05:30 PM  You will need to be at hospital registration 10 minutes before that time. The registration department is located in the long hallway between the [facility] and [address] [place] [address]. This is on the first floor of the [facility] hospital you can enter through the [address] [place] [address].      3.  Your surgery has been scheduled on 12/28/2021  You will need to be at the [address] [place] [address] second floor surgery registration on that day at 11:00 AM    The Day(s) Before Surgery     ·  Nothing by mouth after midnight on 12/27/2021    · Plan to have someone take you home from the hospital.    · Do not use any products that contain nicotine or tobacco, such as cigarettes and e-cigarettes. These can delay healing after surgery. If you need help quitting, ask your health care provider.    ·  Do not take vitamins or aspirin one week before surgery ( if applicable). Do not take Ibuprofen or NSAIDs 5 days prior to Surgery. Do not take ACE or ARB medications for blood pressure the morning of surgery. If you are taking insulin, take 1/2 dose insulin the night prior to surgery.   Bring them with you to the hospital (Diabetic patient should bring insulin if instructed by the managing physician). If you are taking a blood thinner ( Eliquis, Coumadin, Xarelto, Lovenox, Asprin, Heparin, etc.) please have the provider that manages this instruct you on when to stop taking prior to surgery.     · Continue antidepressants, Beta Blockers \"olol\", anti-seizure medication, GERD medication (heartburn), Opioids and Parkinson's medication.     · Suboxone and Phentermine needs to stopped 7 days prior to surgery.      · If you are feeling " sick, have a fever or cough and have seen your PCP let our office know 48 hours prior to surgery. It may be subject to rescheduling.         The Day of Surgery:    Do not chew gum or tobacco, smoke, or eat mints or hard candy. Shower and wash your hair. You may brush your teeth but  do not swallow water. Use any wipes that Pre-admission testing has given you.     Please arrive for surgery as instructed by the pre-op nurse, often one to two hours before your surgery.    • Remove all jewelry, including rings and piercings. Do not bring valuables to the hospital.    • Wear loose-fitting clothing.    • Avoid wearing eye makeup or contact lenses    • Please remember to bring:  ? Photo ID and medical insurance card  ? Advanced directives, living will or power of  (if applicable)  ? Current list of all medications, including over-the- counter and herbal supplements  ? List of allergies  ? CPAP device if you have sleep apnea  ? Any assistive devices or equipment needed after surgery  ? Books/magazines to pass the time    • When you arrive, check in at the surgery registration desk on the second floor of the [address] [place] [address].     • Once you are called to go to your pre-op room, no one will be allowed in the pre op room.     Please note no one under age 12 is permitted to stay in the waiting area without supervision.    We make every effort to begin surgery at your scheduled start time but delays do occur. We will keep you and your family  updated about any delays.    While You are In the Pre-Op Room:  • The nurse will review your health history and will place an IV (into the vein) in your hand or arm for fluids and  medicines.  • An anesthesia provider will talk with you about anesthesia and pain control during and after surgery.  • A member of the surgical team can answer your questions.             What can I expect after the procedure?    After Surgery and/or Discharge:    After surgery you will be moved to the  recovery area. Recovery and discharge times will vary depending on the procedure.    The recovery room nurse will provide your family/visitors with updates. Visitors are not permitted in the immediate postoperative recovery area, but your visitors will be notified when they can come to see you after surgery.    If you will not be admitted to the hospital, your nurse will assess your readiness to go home. This includes your:    • Ability to walk, use crutches, etc.  • Ability to urinate adequate amounts  • Nausea and pain control    Before discharge, you will receive written instructions about how to care for yourself at home along with any prescriptions  for medications.     For your safety, you will need a responsible adult age 18 or older to accompany you home.     Please have a ride arranged and available when you are ready to leave. You cannot leave alone and it is recommended someone stay with you for the first 24 hours after anesthesia.       After the procedure, it is common to have:    · Pain.  · Soreness and numbness in your incision areas.  · Vaginal bleeding and discharge up to 6 weeks after surgery.  · Constipation.  · Temporary change in bladder function.  · Feelings of sadness or other emotions.  · Small amounts of clear drainage from incisions  · If you are discharged with an abdominal binder, this is to be used as needed for incisional comfort. You may choose to discontinue use at any time.           Follow these instructions at home:  Medicines    · Please take any medications that have been prescribed after your surgery, you may take over the counter Tylenol and Ibuprofen, unless told otherwise by your provider.   · Please take your stool softener as prescribed. If you do not have a bowel movement within 24 hours following surgery try a laxative (milk of magnesia) or you may take Toyin-Lax.    Activity    · Return to your normal activities as told by your health care provider.   · You may be told to  take short walks every day and go farther each time.  · Do not lift anything that is heavier than 20 lbs.      General instructions    · Do not put anything in your vagina for 6 weeks after your surgery or as told by your health care provider. This includes tampons and douches.  · Do not have sex until your health care provider says you can.  · Do not take baths, swim, or use a hot tub until your health care provider approves.  · Drink enough fluid to keep your urine clear or pale yellow.  · Do not drive for 24 hours if you were given a sedative.  · Do not drive while taking Narcotic Pain medication ( oxycodone, hydrocodone, etc.).   · Keep all follow-up visits as told by your health care provider. This is important. You will have a post op appointment typically 3 weeks after surgery.  · Make sure you are urinating on a scheduled basis, for example every 2 hours. This will help retrain your bladder after surgery and prevent urinary tract infections.     Contact a health care provider if:    · Your pain medicine is not helping.  · You have a fever over 101.0  · You have redness, swelling, or pain at your incision site.  · You continue to have difficulty urinating.  · You have not had a bowel movement 2-3 days after surgery, experience nausea and vomiting, are unable to pass gas.   · Large volumes of drainage or blood from incisions, requiring multiple guaze changes.     Get help right away if:    · You have severe abdominal or back pain that is not controlled with medication.  · You have heavy bleeding from your vagina that saturates a maxi pad within 1-2 hours. Note- vaginal bleeding and spotting is normal up to 6 weeks from a hysterectomy, Heavy Bleeding is not.     If you experience a medical emergency call 911 or have someone drive you to your nearest emergency department.         Parking Information:    Free parking is available for patients and guests. There is  parking at the [address] in front of the  [facility]. Parking is also available in the [facility] and [facility].             Financial Assistance:    If you have any questions or need assistance, contact your [facility] financial counseling office from 8:30 a.m.-4:30  p.m. Monday through Friday. Closed weekends.    •  [place]: [phone] or, or visit at [address] [facility], [address], near the entrance.        Patient Payments and Correspondence    Customer service representatives are available to assist you from 8:00 a.m. to 6:00 p.m. Eastern Standard Time by calling [phone] Monday through Friday. You can also contact us through [employer].    [facility]  [address]  [place] [address] [phone]

## 2021-12-27 ENCOUNTER — ANESTHESIA EVENT (OUTPATIENT)
Dept: PERIOP | Facility: HOSPITAL | Age: 51
End: 2021-12-27

## 2021-12-27 ENCOUNTER — HOSPITAL ENCOUNTER (OUTPATIENT)
Dept: GENERAL RADIOLOGY | Facility: HOSPITAL | Age: 51
Discharge: HOME | End: 2021-12-27

## 2021-12-27 ENCOUNTER — PRE-ADMISSION TESTING (OUTPATIENT)
Dept: PREADMISSION TESTING | Facility: HOSPITAL | Age: 51
End: 2021-12-27

## 2021-12-27 VITALS — BODY MASS INDEX: 34.15 KG/M2 | WEIGHT: 200 LBS | HEIGHT: 64 IN

## 2021-12-27 DIAGNOSIS — C54.1 ENDOMETRIAL CANCER: ICD-10-CM

## 2021-12-27 DIAGNOSIS — E11.40 TYPE 2 DIABETES MELLITUS WITH DIABETIC NEUROPATHY, WITHOUT LONG-TERM CURRENT USE OF INSULIN: ICD-10-CM

## 2021-12-27 DIAGNOSIS — I10 PRIMARY HYPERTENSION: ICD-10-CM

## 2021-12-27 LAB
ABO GROUP BLD: NORMAL
ALBUMIN SERPL-MCNC: 4.6 G/DL (ref 3.5–5.2)
ALBUMIN/GLOB SERPL: 1.8 G/DL
ALP SERPL-CCNC: 67 U/L (ref 39–117)
ALT SERPL W P-5'-P-CCNC: 8 U/L (ref 1–33)
ANION GAP SERPL CALCULATED.3IONS-SCNC: 14 MMOL/L (ref 5–15)
AST SERPL-CCNC: 8 U/L (ref 1–32)
BASOPHILS # BLD AUTO: 0.16 10*3/MM3 (ref 0–0.2)
BASOPHILS NFR BLD AUTO: 1.2 % (ref 0–1.5)
BILIRUB SERPL-MCNC: 0.2 MG/DL (ref 0–1.2)
BLD GP AB SCN SERPL QL: NEGATIVE
BUN SERPL-MCNC: 31 MG/DL (ref 6–20)
BUN/CREAT SERPL: 29.5 (ref 7–25)
CALCIUM SPEC-SCNC: 9.8 MG/DL (ref 8.6–10.5)
CHLORIDE SERPL-SCNC: 100 MMOL/L (ref 98–107)
CO2 SERPL-SCNC: 28 MMOL/L (ref 22–29)
CREAT SERPL-MCNC: 1.05 MG/DL (ref 0.57–1)
DEPRECATED RDW RBC AUTO: 44.6 FL (ref 37–54)
EOSINOPHIL # BLD AUTO: 1.26 10*3/MM3 (ref 0–0.4)
EOSINOPHIL NFR BLD AUTO: 9.6 % (ref 0.3–6.2)
ERYTHROCYTE [DISTWIDTH] IN BLOOD BY AUTOMATED COUNT: 13.7 % (ref 12.3–15.4)
GFR SERPL CREATININE-BSD FRML MDRD: 55 ML/MIN/1.73
GLOBULIN UR ELPH-MCNC: 2.6 GM/DL
GLUCOSE SERPL-MCNC: 123 MG/DL (ref 65–99)
HBA1C MFR BLD: 5.9 % (ref 4.8–5.6)
HCT VFR BLD AUTO: 46.4 % (ref 34–46.6)
HGB BLD-MCNC: 15.3 G/DL (ref 12–15.9)
IMM GRANULOCYTES # BLD AUTO: 0.07 10*3/MM3 (ref 0–0.05)
IMM GRANULOCYTES NFR BLD AUTO: 0.5 % (ref 0–0.5)
LYMPHOCYTES # BLD AUTO: 3.65 10*3/MM3 (ref 0.7–3.1)
LYMPHOCYTES NFR BLD AUTO: 27.9 % (ref 19.6–45.3)
MCH RBC QN AUTO: 29.3 PG (ref 26.6–33)
MCHC RBC AUTO-ENTMCNC: 33 G/DL (ref 31.5–35.7)
MCV RBC AUTO: 88.7 FL (ref 79–97)
MONOCYTES # BLD AUTO: 0.79 10*3/MM3 (ref 0.1–0.9)
MONOCYTES NFR BLD AUTO: 6 % (ref 5–12)
NEUTROPHILS NFR BLD AUTO: 54.8 % (ref 42.7–76)
NEUTROPHILS NFR BLD AUTO: 7.14 10*3/MM3 (ref 1.7–7)
NRBC BLD AUTO-RTO: 0 /100 WBC (ref 0–0.2)
PLATELET # BLD AUTO: 248 10*3/MM3 (ref 140–450)
PMV BLD AUTO: 10.1 FL (ref 6–12)
POTASSIUM SERPL-SCNC: 4.4 MMOL/L (ref 3.5–5.2)
PROT SERPL-MCNC: 7.2 G/DL (ref 6–8.5)
RBC # BLD AUTO: 5.23 10*6/MM3 (ref 3.77–5.28)
RH BLD: POSITIVE
SARS-COV-2 RNA PNL SPEC NAA+PROBE: NOT DETECTED
SODIUM SERPL-SCNC: 142 MMOL/L (ref 136–145)
T&S EXPIRATION DATE: NORMAL
WBC NRBC COR # BLD: 13.07 10*3/MM3 (ref 3.4–10.8)

## 2021-12-27 PROCEDURE — 93005 ELECTROCARDIOGRAM TRACING: CPT

## 2021-12-27 PROCEDURE — 86850 RBC ANTIBODY SCREEN: CPT

## 2021-12-27 PROCEDURE — C9803 HOPD COVID-19 SPEC COLLECT: HCPCS

## 2021-12-27 PROCEDURE — 93010 ELECTROCARDIOGRAM REPORT: CPT | Performed by: INTERNAL MEDICINE

## 2021-12-27 PROCEDURE — 83036 HEMOGLOBIN GLYCOSYLATED A1C: CPT

## 2021-12-27 PROCEDURE — 71045 X-RAY EXAM CHEST 1 VIEW: CPT

## 2021-12-27 PROCEDURE — 86901 BLOOD TYPING SEROLOGIC RH(D): CPT

## 2021-12-27 PROCEDURE — U0004 COV-19 TEST NON-CDC HGH THRU: HCPCS

## 2021-12-27 PROCEDURE — 86900 BLOOD TYPING SEROLOGIC ABO: CPT

## 2021-12-27 PROCEDURE — 85025 COMPLETE CBC W/AUTO DIFF WBC: CPT

## 2021-12-27 PROCEDURE — 36415 COLL VENOUS BLD VENIPUNCTURE: CPT

## 2021-12-27 PROCEDURE — 80053 COMPREHEN METABOLIC PANEL: CPT

## 2021-12-27 RX ORDER — SODIUM CHLORIDE 0.9 % (FLUSH) 0.9 %
10 SYRINGE (ML) INJECTION AS NEEDED
Status: CANCELLED | OUTPATIENT
Start: 2021-12-27

## 2021-12-27 RX ORDER — SODIUM CHLORIDE 0.9 % (FLUSH) 0.9 %
10 SYRINGE (ML) INJECTION EVERY 12 HOURS SCHEDULED
Status: CANCELLED | OUTPATIENT
Start: 2021-12-27

## 2021-12-27 RX ORDER — FAMOTIDINE 10 MG/ML
20 INJECTION, SOLUTION INTRAVENOUS ONCE
Status: CANCELLED | OUTPATIENT
Start: 2021-12-27 | End: 2021-12-27

## 2021-12-28 ENCOUNTER — HOSPITAL ENCOUNTER (OUTPATIENT)
Facility: HOSPITAL | Age: 51
Discharge: HOME | End: 2021-12-29
Attending: OBSTETRICS & GYNECOLOGY | Admitting: OBSTETRICS & GYNECOLOGY

## 2021-12-28 ENCOUNTER — ANESTHESIA (OUTPATIENT)
Dept: PERIOP | Facility: HOSPITAL | Age: 51
End: 2021-12-28

## 2021-12-28 ENCOUNTER — TELEPHONE (OUTPATIENT)
Dept: GYNECOLOGIC ONCOLOGY | Facility: CLINIC | Age: 51
End: 2021-12-28

## 2021-12-28 DIAGNOSIS — C54.1 ENDOMETRIAL CANCER: ICD-10-CM

## 2021-12-28 LAB
ABO GROUP BLD: NORMAL
B-HCG UR QL: NEGATIVE
EXPIRATION DATE: NORMAL
GLUCOSE BLDC GLUCOMTR-MCNC: 110 MG/DL (ref 70–130)
INTERNAL NEGATIVE CONTROL: NORMAL
INTERNAL POSITIVE CONTROL: NORMAL
Lab: NORMAL
QT INTERVAL: 412 MS
QTC INTERVAL: 447 MS
RH BLD: POSITIVE

## 2021-12-28 PROCEDURE — 88307 TISSUE EXAM BY PATHOLOGIST: CPT | Performed by: OBSTETRICS & GYNECOLOGY

## 2021-12-28 PROCEDURE — 25010000002 NEOSTIGMINE 10 MG/10ML SOLUTION: Performed by: ANESTHESIOLOGY

## 2021-12-28 PROCEDURE — 58571 TLH W/T/O 250 G OR LESS: CPT | Performed by: OBSTETRICS & GYNECOLOGY

## 2021-12-28 PROCEDURE — 25010000002 ONDANSETRON PER 1 MG: Performed by: ANESTHESIOLOGY

## 2021-12-28 PROCEDURE — 86901 BLOOD TYPING SEROLOGIC RH(D): CPT

## 2021-12-28 PROCEDURE — 25010000002 DEXAMETHASONE PER 1 MG: Performed by: NURSE ANESTHETIST, CERTIFIED REGISTERED

## 2021-12-28 PROCEDURE — 88309 TISSUE EXAM BY PATHOLOGIST: CPT | Performed by: OBSTETRICS & GYNECOLOGY

## 2021-12-28 PROCEDURE — 25010000002 FENTANYL CITRATE (PF) 50 MCG/ML SOLUTION: Performed by: NURSE ANESTHETIST, CERTIFIED REGISTERED

## 2021-12-28 PROCEDURE — C1889 IMPLANT/INSERT DEVICE, NOC: HCPCS | Performed by: OBSTETRICS & GYNECOLOGY

## 2021-12-28 PROCEDURE — 82962 GLUCOSE BLOOD TEST: CPT

## 2021-12-28 PROCEDURE — 0 CEFAZOLIN IN DEXTROSE 2-4 GM/100ML-% SOLUTION: Performed by: OBSTETRICS & GYNECOLOGY

## 2021-12-28 PROCEDURE — 88342 IMHCHEM/IMCYTCHM 1ST ANTB: CPT | Performed by: OBSTETRICS & GYNECOLOGY

## 2021-12-28 PROCEDURE — 86900 BLOOD TYPING SEROLOGIC ABO: CPT

## 2021-12-28 PROCEDURE — 81025 URINE PREGNANCY TEST: CPT | Performed by: ANESTHESIOLOGY

## 2021-12-28 PROCEDURE — 25010000002 FENTANYL CITRATE (PF) 50 MCG/ML SOLUTION

## 2021-12-28 PROCEDURE — 25010000002 PROPOFOL 10 MG/ML EMULSION: Performed by: NURSE ANESTHETIST, CERTIFIED REGISTERED

## 2021-12-28 PROCEDURE — 25010000002 HEPARIN (PORCINE) PER 1000 UNITS: Performed by: OBSTETRICS & GYNECOLOGY

## 2021-12-28 DEVICE — FLOSEAL HEMOSTATIC MATRIX, 10ML
Type: IMPLANTABLE DEVICE | Site: ABDOMEN | Status: FUNCTIONAL
Brand: FLOSEAL HEMOSTATIC MATRIX

## 2021-12-28 RX ORDER — ROCURONIUM BROMIDE 10 MG/ML
INJECTION, SOLUTION INTRAVENOUS AS NEEDED
Status: DISCONTINUED | OUTPATIENT
Start: 2021-12-28 | End: 2021-12-28 | Stop reason: SURG

## 2021-12-28 RX ORDER — GLYCOPYRROLATE 0.2 MG/ML
INJECTION INTRAMUSCULAR; INTRAVENOUS AS NEEDED
Status: DISCONTINUED | OUTPATIENT
Start: 2021-12-28 | End: 2021-12-28 | Stop reason: SURG

## 2021-12-28 RX ORDER — OXYCODONE HYDROCHLORIDE 5 MG/1
10 TABLET ORAL EVERY 4 HOURS PRN
Status: DISCONTINUED | OUTPATIENT
Start: 2021-12-28 | End: 2021-12-29 | Stop reason: HOSPADM

## 2021-12-28 RX ORDER — LIDOCAINE HYDROCHLORIDE 10 MG/ML
0.5 INJECTION, SOLUTION EPIDURAL; INFILTRATION; INTRACAUDAL; PERINEURAL ONCE AS NEEDED
Status: COMPLETED | OUTPATIENT
Start: 2021-12-28 | End: 2021-12-28

## 2021-12-28 RX ORDER — OXYCODONE HYDROCHLORIDE 5 MG/1
5 TABLET ORAL ONCE
Status: COMPLETED | OUTPATIENT
Start: 2021-12-28 | End: 2021-12-28

## 2021-12-28 RX ORDER — PROPOFOL 10 MG/ML
VIAL (ML) INTRAVENOUS AS NEEDED
Status: DISCONTINUED | OUTPATIENT
Start: 2021-12-28 | End: 2021-12-28 | Stop reason: SURG

## 2021-12-28 RX ORDER — ONDANSETRON 4 MG/1
4 TABLET, FILM COATED ORAL EVERY 6 HOURS PRN
Status: DISCONTINUED | OUTPATIENT
Start: 2021-12-28 | End: 2021-12-29 | Stop reason: HOSPADM

## 2021-12-28 RX ORDER — ONDANSETRON 2 MG/ML
4 INJECTION INTRAMUSCULAR; INTRAVENOUS EVERY 6 HOURS PRN
Status: DISCONTINUED | OUTPATIENT
Start: 2021-12-28 | End: 2021-12-29 | Stop reason: HOSPADM

## 2021-12-28 RX ORDER — DEXAMETHASONE SODIUM PHOSPHATE 4 MG/ML
INJECTION, SOLUTION INTRA-ARTICULAR; INTRALESIONAL; INTRAMUSCULAR; INTRAVENOUS; SOFT TISSUE AS NEEDED
Status: DISCONTINUED | OUTPATIENT
Start: 2021-12-28 | End: 2021-12-28 | Stop reason: SURG

## 2021-12-28 RX ORDER — ACETAMINOPHEN 325 MG/1
650 TABLET ORAL EVERY 6 HOURS SCHEDULED
Status: DISCONTINUED | OUTPATIENT
Start: 2021-12-29 | End: 2021-12-29 | Stop reason: HOSPADM

## 2021-12-28 RX ORDER — LIDOCAINE HYDROCHLORIDE 10 MG/ML
INJECTION, SOLUTION EPIDURAL; INFILTRATION; INTRACAUDAL; PERINEURAL AS NEEDED
Status: DISCONTINUED | OUTPATIENT
Start: 2021-12-28 | End: 2021-12-28 | Stop reason: SURG

## 2021-12-28 RX ORDER — FAMOTIDINE 20 MG/1
20 TABLET, FILM COATED ORAL ONCE
Status: COMPLETED | OUTPATIENT
Start: 2021-12-28 | End: 2021-12-28

## 2021-12-28 RX ORDER — PROMETHAZINE HYDROCHLORIDE 12.5 MG/1
12.5 TABLET ORAL EVERY 6 HOURS PRN
Status: DISCONTINUED | OUTPATIENT
Start: 2021-12-28 | End: 2021-12-29 | Stop reason: HOSPADM

## 2021-12-28 RX ORDER — BUPIVACAINE HCL/0.9 % NACL/PF 0.125 %
PLASTIC BAG, INJECTION (ML) EPIDURAL AS NEEDED
Status: DISCONTINUED | OUTPATIENT
Start: 2021-12-28 | End: 2021-12-28 | Stop reason: SURG

## 2021-12-28 RX ORDER — SODIUM CHLORIDE, SODIUM LACTATE, POTASSIUM CHLORIDE, CALCIUM CHLORIDE 600; 310; 30; 20 MG/100ML; MG/100ML; MG/100ML; MG/100ML
9 INJECTION, SOLUTION INTRAVENOUS CONTINUOUS
Status: DISCONTINUED | OUTPATIENT
Start: 2021-12-28 | End: 2021-12-29 | Stop reason: HOSPADM

## 2021-12-28 RX ORDER — ACETAMINOPHEN 500 MG
1000 TABLET ORAL ONCE
Status: COMPLETED | OUTPATIENT
Start: 2021-12-28 | End: 2021-12-28

## 2021-12-28 RX ORDER — LEVOTHYROXINE SODIUM 175 UG/1
175 TABLET ORAL
Status: DISCONTINUED | OUTPATIENT
Start: 2021-12-29 | End: 2021-12-29 | Stop reason: HOSPADM

## 2021-12-28 RX ORDER — IBUPROFEN 600 MG/1
600 TABLET ORAL EVERY 6 HOURS PRN
Status: DISCONTINUED | OUTPATIENT
Start: 2021-12-28 | End: 2021-12-29 | Stop reason: HOSPADM

## 2021-12-28 RX ORDER — FENTANYL CITRATE 50 UG/ML
50 INJECTION, SOLUTION INTRAMUSCULAR; INTRAVENOUS
Status: DISCONTINUED | OUTPATIENT
Start: 2021-12-28 | End: 2021-12-28 | Stop reason: HOSPADM

## 2021-12-28 RX ORDER — OXYCODONE HYDROCHLORIDE 5 MG/1
5 TABLET ORAL EVERY 4 HOURS PRN
Status: DISCONTINUED | OUTPATIENT
Start: 2021-12-28 | End: 2021-12-29 | Stop reason: HOSPADM

## 2021-12-28 RX ORDER — GABAPENTIN 300 MG/1
300 CAPSULE ORAL ONCE
Status: COMPLETED | OUTPATIENT
Start: 2021-12-28 | End: 2021-12-28

## 2021-12-28 RX ORDER — IPRATROPIUM BROMIDE AND ALBUTEROL SULFATE 2.5; .5 MG/3ML; MG/3ML
3 SOLUTION RESPIRATORY (INHALATION) ONCE AS NEEDED
Status: DISCONTINUED | OUTPATIENT
Start: 2021-12-28 | End: 2021-12-28 | Stop reason: HOSPADM

## 2021-12-28 RX ORDER — CALCIUM CARBONATE 200(500)MG
2 TABLET,CHEWABLE ORAL 3 TIMES DAILY PRN
Status: DISCONTINUED | OUTPATIENT
Start: 2021-12-28 | End: 2021-12-29 | Stop reason: HOSPADM

## 2021-12-28 RX ORDER — ONDANSETRON 2 MG/ML
INJECTION INTRAMUSCULAR; INTRAVENOUS AS NEEDED
Status: DISCONTINUED | OUTPATIENT
Start: 2021-12-28 | End: 2021-12-28 | Stop reason: SURG

## 2021-12-28 RX ORDER — HEPARIN SODIUM 5000 [USP'U]/ML
5000 INJECTION, SOLUTION INTRAVENOUS; SUBCUTANEOUS ONCE
Status: COMPLETED | OUTPATIENT
Start: 2021-12-28 | End: 2021-12-28

## 2021-12-28 RX ORDER — MIDAZOLAM HYDROCHLORIDE 1 MG/ML
1 INJECTION INTRAMUSCULAR; INTRAVENOUS
Status: DISCONTINUED | OUTPATIENT
Start: 2021-12-28 | End: 2021-12-28 | Stop reason: HOSPADM

## 2021-12-28 RX ORDER — FENTANYL CITRATE 50 UG/ML
INJECTION, SOLUTION INTRAMUSCULAR; INTRAVENOUS AS NEEDED
Status: DISCONTINUED | OUTPATIENT
Start: 2021-12-28 | End: 2021-12-28 | Stop reason: SURG

## 2021-12-28 RX ORDER — NEOSTIGMINE METHYLSULFATE 1 MG/ML
INJECTION, SOLUTION INTRAVENOUS AS NEEDED
Status: DISCONTINUED | OUTPATIENT
Start: 2021-12-28 | End: 2021-12-28 | Stop reason: SURG

## 2021-12-28 RX ORDER — BUPIVACAINE HYDROCHLORIDE AND EPINEPHRINE 5; 5 MG/ML; UG/ML
INJECTION, SOLUTION PERINEURAL AS NEEDED
Status: DISCONTINUED | OUTPATIENT
Start: 2021-12-28 | End: 2021-12-28 | Stop reason: HOSPADM

## 2021-12-28 RX ORDER — AMOXICILLIN 250 MG
2 CAPSULE ORAL 2 TIMES DAILY
Status: DISCONTINUED | OUTPATIENT
Start: 2021-12-28 | End: 2021-12-29 | Stop reason: HOSPADM

## 2021-12-28 RX ORDER — SIMETHICONE 80 MG
80 TABLET,CHEWABLE ORAL 4 TIMES DAILY PRN
Status: DISCONTINUED | OUTPATIENT
Start: 2021-12-28 | End: 2021-12-29 | Stop reason: HOSPADM

## 2021-12-28 RX ORDER — DIPHENHYDRAMINE HCL 25 MG
25 CAPSULE ORAL NIGHTLY PRN
Status: DISCONTINUED | OUTPATIENT
Start: 2021-12-28 | End: 2021-12-29 | Stop reason: HOSPADM

## 2021-12-28 RX ORDER — HYDROMORPHONE HYDROCHLORIDE 1 MG/ML
0.5 INJECTION, SOLUTION INTRAMUSCULAR; INTRAVENOUS; SUBCUTANEOUS
Status: DISCONTINUED | OUTPATIENT
Start: 2021-12-28 | End: 2021-12-28 | Stop reason: HOSPADM

## 2021-12-28 RX ORDER — PREGABALIN 100 MG/1
300 CAPSULE ORAL 2 TIMES DAILY
Status: DISCONTINUED | OUTPATIENT
Start: 2021-12-29 | End: 2021-12-29 | Stop reason: HOSPADM

## 2021-12-28 RX ORDER — ONDANSETRON 2 MG/ML
4 INJECTION INTRAMUSCULAR; INTRAVENOUS ONCE AS NEEDED
Status: DISCONTINUED | OUTPATIENT
Start: 2021-12-28 | End: 2021-12-28 | Stop reason: HOSPADM

## 2021-12-28 RX ORDER — SODIUM CHLORIDE, SODIUM LACTATE, POTASSIUM CHLORIDE, CALCIUM CHLORIDE 600; 310; 30; 20 MG/100ML; MG/100ML; MG/100ML; MG/100ML
100 INJECTION, SOLUTION INTRAVENOUS CONTINUOUS
Status: DISCONTINUED | OUTPATIENT
Start: 2021-12-28 | End: 2021-12-29

## 2021-12-28 RX ORDER — EPHEDRINE SULFATE 50 MG/ML
INJECTION, SOLUTION INTRAVENOUS AS NEEDED
Status: DISCONTINUED | OUTPATIENT
Start: 2021-12-28 | End: 2021-12-28 | Stop reason: SURG

## 2021-12-28 RX ORDER — CEFAZOLIN SODIUM 2 G/100ML
2 INJECTION, SOLUTION INTRAVENOUS ONCE
Status: COMPLETED | OUTPATIENT
Start: 2021-12-28 | End: 2021-12-28

## 2021-12-28 RX ORDER — TRAZODONE HYDROCHLORIDE 50 MG/1
50 TABLET ORAL NIGHTLY PRN
Status: DISCONTINUED | OUTPATIENT
Start: 2021-12-28 | End: 2021-12-29 | Stop reason: HOSPADM

## 2021-12-28 RX ORDER — FENTANYL CITRATE 50 UG/ML
INJECTION, SOLUTION INTRAMUSCULAR; INTRAVENOUS
Status: COMPLETED
Start: 2021-12-28 | End: 2021-12-28

## 2021-12-28 RX ORDER — DIPHENHYDRAMINE HYDROCHLORIDE 50 MG/ML
25 INJECTION INTRAMUSCULAR; INTRAVENOUS NIGHTLY PRN
Status: DISCONTINUED | OUTPATIENT
Start: 2021-12-28 | End: 2021-12-29 | Stop reason: HOSPADM

## 2021-12-28 RX ADMIN — LIDOCAINE HYDROCHLORIDE 0.5 ML: 10 INJECTION, SOLUTION EPIDURAL; INFILTRATION; INTRACAUDAL; PERINEURAL at 12:49

## 2021-12-28 RX ADMIN — ROCURONIUM BROMIDE 15 MG: 10 INJECTION INTRAVENOUS at 16:36

## 2021-12-28 RX ADMIN — OXYCODONE HYDROCHLORIDE 10 MG: 5 TABLET ORAL at 23:34

## 2021-12-28 RX ADMIN — PREGABALIN 300 MG: 100 CAPSULE ORAL at 23:35

## 2021-12-28 RX ADMIN — LIDOCAINE HYDROCHLORIDE 50 MG: 10 INJECTION, SOLUTION EPIDURAL; INFILTRATION; INTRACAUDAL; PERINEURAL at 15:38

## 2021-12-28 RX ADMIN — DEXAMETHASONE SODIUM PHOSPHATE 8 MG: 4 INJECTION, SOLUTION INTRA-ARTICULAR; INTRALESIONAL; INTRAMUSCULAR; INTRAVENOUS; SOFT TISSUE at 15:42

## 2021-12-28 RX ADMIN — CEFAZOLIN SODIUM 2 G: 2 INJECTION, SOLUTION INTRAVENOUS at 15:42

## 2021-12-28 RX ADMIN — Medication 100 MCG: at 15:57

## 2021-12-28 RX ADMIN — ACETAMINOPHEN 1000 MG: 500 TABLET ORAL at 12:47

## 2021-12-28 RX ADMIN — EPHEDRINE SULFATE 10 MG: 50 INJECTION INTRAVENOUS at 15:57

## 2021-12-28 RX ADMIN — FENTANYL CITRATE 50 MCG: 50 INJECTION INTRAMUSCULAR; INTRAVENOUS at 18:41

## 2021-12-28 RX ADMIN — HEPARIN SODIUM 5000 UNITS: 5000 INJECTION, SOLUTION INTRAVENOUS; SUBCUTANEOUS at 14:40

## 2021-12-28 RX ADMIN — EPHEDRINE SULFATE 10 MG: 50 INJECTION INTRAVENOUS at 15:49

## 2021-12-28 RX ADMIN — DOCUSATE SODIUM 50 MG AND SENNOSIDES 8.6 MG 2 TABLET: 8.6; 5 TABLET, FILM COATED ORAL at 21:24

## 2021-12-28 RX ADMIN — OXYCODONE 5 MG: 5 TABLET ORAL at 12:48

## 2021-12-28 RX ADMIN — PROPOFOL 200 MG: 10 INJECTION, EMULSION INTRAVENOUS at 15:38

## 2021-12-28 RX ADMIN — EPHEDRINE SULFATE 10 MG: 50 INJECTION INTRAVENOUS at 15:50

## 2021-12-28 RX ADMIN — GLYCOPYRROLATE 0.4 MG: 0.2 INJECTION INTRAMUSCULAR; INTRAVENOUS at 17:51

## 2021-12-28 RX ADMIN — GLYCOPYRROLATE 0.2 MG: 0.2 INJECTION INTRAMUSCULAR; INTRAVENOUS at 15:46

## 2021-12-28 RX ADMIN — ROCURONIUM BROMIDE 50 MG: 10 INJECTION INTRAVENOUS at 15:38

## 2021-12-28 RX ADMIN — ONDANSETRON 4 MG: 2 INJECTION INTRAMUSCULAR; INTRAVENOUS at 17:51

## 2021-12-28 RX ADMIN — NEOSTIGMINE 3 MG: 1 INJECTION INTRAVENOUS at 17:51

## 2021-12-28 RX ADMIN — SODIUM CHLORIDE, POTASSIUM CHLORIDE, SODIUM LACTATE AND CALCIUM CHLORIDE 9 ML/HR: 600; 310; 30; 20 INJECTION, SOLUTION INTRAVENOUS at 12:49

## 2021-12-28 RX ADMIN — FAMOTIDINE 20 MG: 20 TABLET ORAL at 12:51

## 2021-12-28 RX ADMIN — ACETAMINOPHEN 650 MG: 325 TABLET, FILM COATED ORAL at 23:35

## 2021-12-28 RX ADMIN — FENTANYL CITRATE 100 MCG: 50 INJECTION, SOLUTION INTRAMUSCULAR; INTRAVENOUS at 15:38

## 2021-12-28 RX ADMIN — SODIUM CHLORIDE, POTASSIUM CHLORIDE, SODIUM LACTATE AND CALCIUM CHLORIDE: 600; 310; 30; 20 INJECTION, SOLUTION INTRAVENOUS at 16:22

## 2021-12-28 RX ADMIN — FENTANYL CITRATE 50 MCG: 50 INJECTION INTRAMUSCULAR; INTRAVENOUS at 19:01

## 2021-12-28 RX ADMIN — GABAPENTIN 300 MG: 300 CAPSULE ORAL at 12:48

## 2021-12-28 NOTE — ANESTHESIA PROCEDURE NOTES
Airway  Urgency: elective    Date/Time: 12/28/2021 3:40 PM  Airway not difficult    General Information and Staff    Patient location during procedure: OR  CRNA: Santhosh Sosa CRNA    Indications and Patient Condition  Indications for airway management: airway protection    Preoxygenated: yes  MILS not maintained throughout  Mask difficulty assessment: 1 - vent by mask    Final Airway Details  Final airway type: endotracheal airway      Successful airway: ETT  Cuffed: yes   Successful intubation technique: direct laryngoscopy  Facilitating devices/methods: intubating stylet and anterior pressure/BURP  Endotracheal tube insertion site: oral  Blade: Anderson  Blade size: 2  ETT size (mm): 7.0  Cormack-Lehane Classification: grade IIa - partial view of glottis  Placement verified by: chest auscultation and capnometry   Cuff volume (mL): 6  Measured from: lips  ETT/EBT  to lips (cm): 20  Number of attempts at approach: 1  Assessment: lips, teeth, and gum same as pre-op and atraumatic intubation    Additional Comments  Negative epigastric sounds, Breath sound equal bilaterally with symmetric chest rise and fall

## 2021-12-28 NOTE — ANESTHESIA POSTPROCEDURE EVALUATION
Patient: Ann Galdamez    Procedure Summary     Date: 12/28/21 Room / Location:  [facility] 20 /  [facility]    Anesthesia Start: 1534 Anesthesia Stop: 1807    Procedure: TOTAL LAPAROSCOPIC HYSTERECTOMY BILATERAL SALPINGOOPHORECTOMY, INJECTION FOR SENTINEL LYMPH NODE MAPPING, BILATERAL PELVIC SENTINEL LYMPH NODE DISSECTION WITH DAVINCI ROBOT (N/A Abdomen) Diagnosis:       Endometrial cancer (HCC)      (Endometrial cancer (HCC) [C54.1])    Surgeons: Liane Liz MD Provider: Anshul Ragsdale MD    Anesthesia Type: general ASA Status: 3          Anesthesia Type: general    Vitals  Vitals Value Taken Time   /73 12/28/21 1802   Temp 98.6 °F (37 °C) 12/28/21 1802   Pulse 81 12/28/21 1806   Resp 16 12/28/21 1802   SpO2 96 % 12/28/21 1806   Vitals shown include unvalidated device data.        Post Anesthesia Care and Evaluation    Patient location during evaluation: PACU  Patient participation: complete - patient participated  Level of consciousness: awake and alert  Pain management: adequate  Airway patency: patent  Anesthetic complications: No anesthetic complications  PONV Status: none  Cardiovascular status: hemodynamically stable and acceptable  Respiratory status: nonlabored ventilation, acceptable and nasal cannula  Hydration status: acceptable

## 2021-12-28 NOTE — TELEPHONE ENCOUNTER
----- Message from Dejah Jensen sent at 12/23/2021  3:48 PM EST -----  Regarding: RE: DIABILITY PPWK  Good afternoon all,   This pt has left disability ppwk in Debi's office to be filled out by . Pt would like a call once completed and would like to be contacted when the paperwork is completed.   Thanks and happy holidays,   Dejah DIEGO

## 2021-12-29 VITALS
TEMPERATURE: 97.5 F | OXYGEN SATURATION: 96 % | BODY MASS INDEX: 34.15 KG/M2 | WEIGHT: 200 LBS | SYSTOLIC BLOOD PRESSURE: 105 MMHG | DIASTOLIC BLOOD PRESSURE: 67 MMHG | HEIGHT: 64 IN | HEART RATE: 84 BPM | RESPIRATION RATE: 18 BRPM

## 2021-12-29 LAB
GLUCOSE BLDC GLUCOMTR-MCNC: 140 MG/DL (ref 70–130)
GLUCOSE BLDC GLUCOMTR-MCNC: 199 MG/DL (ref 70–130)

## 2021-12-29 PROCEDURE — 82962 GLUCOSE BLOOD TEST: CPT

## 2021-12-29 PROCEDURE — 63710000001 INSULIN LISPRO (HUMAN) PER 5 UNITS: Performed by: STUDENT IN AN ORGANIZED HEALTH CARE EDUCATION/TRAINING PROGRAM

## 2021-12-29 PROCEDURE — 25010000002 ENOXAPARIN PER 10 MG: Performed by: STUDENT IN AN ORGANIZED HEALTH CARE EDUCATION/TRAINING PROGRAM

## 2021-12-29 RX ORDER — DOCUSATE SODIUM 100 MG/1
200 CAPSULE, LIQUID FILLED ORAL 2 TIMES DAILY
Qty: 120 CAPSULE | Refills: 1 | Status: SHIPPED | OUTPATIENT
Start: 2021-12-29 | End: 2022-07-13

## 2021-12-29 RX ORDER — NICOTINE POLACRILEX 4 MG
15 LOZENGE BUCCAL
Status: DISCONTINUED | OUTPATIENT
Start: 2021-12-29 | End: 2021-12-29 | Stop reason: HOSPADM

## 2021-12-29 RX ORDER — DEXTROSE MONOHYDRATE 25 G/50ML
25 INJECTION, SOLUTION INTRAVENOUS
Status: DISCONTINUED | OUTPATIENT
Start: 2021-12-29 | End: 2021-12-29 | Stop reason: HOSPADM

## 2021-12-29 RX ORDER — IBUPROFEN 600 MG/1
600 TABLET ORAL EVERY 6 HOURS PRN
Qty: 30 TABLET | Refills: 1 | Status: SHIPPED | OUTPATIENT
Start: 2021-12-29

## 2021-12-29 RX ADMIN — ENOXAPARIN SODIUM 40 MG: 40 INJECTION SUBCUTANEOUS at 08:27

## 2021-12-29 RX ADMIN — ACETAMINOPHEN 650 MG: 325 TABLET, FILM COATED ORAL at 12:08

## 2021-12-29 RX ADMIN — LEVOTHYROXINE SODIUM 175 MCG: 0.17 TABLET ORAL at 05:21

## 2021-12-29 RX ADMIN — ACETAMINOPHEN 650 MG: 325 TABLET, FILM COATED ORAL at 05:21

## 2021-12-29 RX ADMIN — DOCUSATE SODIUM 50 MG AND SENNOSIDES 8.6 MG 2 TABLET: 8.6; 5 TABLET, FILM COATED ORAL at 08:27

## 2021-12-29 RX ADMIN — PREGABALIN 300 MG: 100 CAPSULE ORAL at 08:26

## 2021-12-29 RX ADMIN — INSULIN LISPRO 2 UNITS: 100 INJECTION, SOLUTION INTRAVENOUS; SUBCUTANEOUS at 08:27

## 2021-12-29 RX ADMIN — OXYCODONE HYDROCHLORIDE 5 MG: 5 TABLET ORAL at 07:10

## 2021-12-29 RX ADMIN — OXYCODONE HYDROCHLORIDE 5 MG: 5 TABLET ORAL at 12:08

## 2021-12-30 NOTE — TELEPHONE ENCOUNTER
Husbands FMLA was finished and faxed. I then mailed the pt a copy of both of their FMLAs as requested.

## 2022-01-03 RX ORDER — TRAZODONE HYDROCHLORIDE 50 MG/1
TABLET ORAL
Qty: 90 TABLET | Refills: 3 | OUTPATIENT
Start: 2022-01-03

## 2022-01-03 NOTE — TELEPHONE ENCOUNTER
LOV 9-15-21  NOV 2-16-22  Last rx was 3-16-21 for #90 with 3 refills  rx will be denied  Too early to refill

## 2022-01-06 ENCOUNTER — TELEPHONE (OUTPATIENT)
Dept: GYNECOLOGIC ONCOLOGY | Facility: CLINIC | Age: 52
End: 2022-01-06

## 2022-01-06 NOTE — TELEPHONE ENCOUNTER
Pt notified, she v/u and appreciation.      Pt has one more insurance form to fill out and will be sending it directly to my confidential work e-mail because she doesn't have access to a fax machine.

## 2022-01-06 NOTE — TELEPHONE ENCOUNTER
----- Message from Liane Liz MD sent at 1/6/2022  8:26 AM EST -----  Please let Ms. Galdamez know that her path demonstrated a very small area of residual cancer. She is a stage IA endometrial cancer which is the lowest stage possible. Her risk of recurrence is < 5% and she does not need any additional treatment.  ----- Message -----  From: Lab, Background User  Sent: 1/5/2022   1:38 PM EST  To: Liane Liz MD

## 2022-01-11 ENCOUNTER — TELEPHONE (OUTPATIENT)
Dept: GYNECOLOGIC ONCOLOGY | Facility: CLINIC | Age: 52
End: 2022-01-11

## 2022-01-11 NOTE — TELEPHONE ENCOUNTER
Called pt and told her that her fmla is until 02/08/22 and her husbands fmla was until the 01/09/22. She is to call me back after she talks to her work.

## 2022-01-11 NOTE — TELEPHONE ENCOUNTER
Caller: Ann Galdamez    Relationship: Self    Best call back number: [phone]    What form or medical record are you requesting: FMLA     Who is requesting this form or medical record from you: EMPLOYER    How would you like to receive the form or medical records (pick-up, mail, fax):EMAIL  [email or web address]  (HUSBANDS EMAIL ADDRESS)    Timeframe paperwork needed: ASAP    Additional notes: PATIENT CALLED STATED THAT HER FMLA PAPERWORK STATED SHE WAS SUPPOSED TO RETURN TO WORK 1-9-22, PATIENT STATED THAT SHE DOESN'T EVEN FOLLOW UP WITH DR SARGENT UNTIL 1-20-22. PLEASE CALL TO ADVISE

## 2022-01-20 ENCOUNTER — OFFICE VISIT (OUTPATIENT)
Dept: GYNECOLOGIC ONCOLOGY | Facility: CLINIC | Age: 52
End: 2022-01-20

## 2022-01-20 VITALS
TEMPERATURE: 97.1 F | WEIGHT: 204 LBS | DIASTOLIC BLOOD PRESSURE: 71 MMHG | SYSTOLIC BLOOD PRESSURE: 121 MMHG | RESPIRATION RATE: 18 BRPM | HEIGHT: 64 IN | BODY MASS INDEX: 34.83 KG/M2 | HEART RATE: 75 BPM

## 2022-01-20 DIAGNOSIS — Z09 POSTOP CHECK: Primary | ICD-10-CM

## 2022-01-20 DIAGNOSIS — C54.1 ENDOMETRIAL CANCER: ICD-10-CM

## 2022-01-20 DIAGNOSIS — Z71.89 OTHER SPECIFIED COUNSELING: ICD-10-CM

## 2022-01-20 PROCEDURE — 99024 POSTOP FOLLOW-UP VISIT: CPT | Performed by: OBSTETRICS & GYNECOLOGY

## 2022-01-20 RX ORDER — DULOXETIN HYDROCHLORIDE 30 MG/1
CAPSULE, DELAYED RELEASE ORAL
COMMUNITY
Start: 2022-01-12 | End: 2022-02-16 | Stop reason: DRUGHIGH

## 2022-01-20 NOTE — PROGRESS NOTES
"     Post Operative Office Visit      Patient Name: Ann Galdamez  : 1970   MRN: [id number]     Chief Complaint:  Postoperative visit    History of Present Illness: Ann Galdamez is a 51 y.o. female who is status post robotic assisted total laparoscopic hysterectomy with bilateral pelvic sentinel lymph node dissection on 2021 for grade 2 endometrial cancer. Her post operative course has been unremarkable and continues to be recovering well. She is tolerating a normal diet. She reports normal return of bowel function. She states her pain is well controlled.     Medical, surgical, and family history updated as needed.  Subjective      Review of Systems:   Review of Systems   Constitutional: Negative for activity change, fatigue and fever.   Respiratory: Negative for shortness of breath.    Cardiovascular: Negative for chest pain and leg swelling.   Gastrointestinal: Positive for abdominal pain. Negative for constipation, diarrhea, nausea and vomiting.   Genitourinary: Negative for difficulty urinating, vaginal bleeding and vaginal discharge.   Neurological: Negative for dizziness and light-headedness.        Objective     Physical Exam:  Vital Signs:   Vitals:    22 1327   BP: 121/71   Pulse: 75   Resp: 18   Temp: 97.1 °F (36.2 °C)   TempSrc: Temporal   Weight: 92.5 kg (204 lb)   Height: 162.6 cm (64.02\")   PainSc: 0-No pain     BMI: Body mass index is 35 kg/m².     ECOG performance status 0    Physical Exam  Vitals and nursing note reviewed. Exam conducted with a chaperone present.   Constitutional:       General: She is not in acute distress.     Appearance: Normal appearance. She is well-developed. She is not diaphoretic.   HENT:      Head: Normocephalic and atraumatic.      Right Ear: External ear normal.      Left Ear: External ear normal.   Eyes:      General: No scleral icterus.        Right eye: No discharge.         Left eye: No discharge.      Conjunctiva/sclera: " Conjunctivae normal.   Neck:      Thyroid: No thyromegaly.   Cardiovascular:      Rate and Rhythm: Normal rate.   Pulmonary:      Effort: Pulmonary effort is normal. No respiratory distress.   Abdominal:      General: There is no distension.      Palpations: Abdomen is soft. There is no mass.      Tenderness: There is no abdominal tenderness. There is no guarding.      Comments: Incision clean, dry, intact. No erythema or discharge. No evidence of infection.   Genitourinary:     Comments: External genitalia normal. Vagina without discharge. Vaginal cuff intact. Uterus, cervix and adnexa surgically absent. Rectovaginal exam deferred.  Musculoskeletal:         General: No swelling, tenderness, deformity or signs of injury.      Cervical back: Neck supple.      Right lower leg: No edema.      Left lower leg: No edema.   Lymphadenopathy:      Cervical: No cervical adenopathy.   Skin:     General: Skin is warm and dry.      Coloration: Skin is not jaundiced.      Findings: No erythema, lesion or rash.   Neurological:      General: No focal deficit present.      Mental Status: She is alert and oriented to person, place, and time. Mental status is at baseline.      Motor: No abnormal muscle tone.   Psychiatric:         Behavior: Behavior normal.         Thought Content: Thought content normal.         Medications:     Current Outpatient Medications:   •  CRANBERRY PO, Take 1 capsule by mouth daily., Disp: , Rfl:   •  cyanocobalamin 1000 MCG/ML injection, INJECT 1 ML UNDER THE SKIN INTO THE APPROPRIATE AREA AS DIRECTED EVERY 28 DAYS (Patient taking differently: Every 28 (Twenty-Eight) Days.), Disp: 3 mL, Rfl: 0  •  docusate sodium (COLACE) 100 MG capsule, Take 2 capsules by mouth 2 (Two) Times a Day., Disp: 120 capsule, Rfl: 1  •  DULoxetine (CYMBALTA) 30 MG capsule, , Disp: , Rfl:   •  HYDROcodone-acetaminophen (NORCO) 5-325 MG per tablet, Take 1 tablet by mouth Every 6 (Six) Hours As Needed for Moderate Pain ., Disp: ,  Rfl:   •  ibuprofen (ADVIL,MOTRIN) 600 MG tablet, Take 1 tablet by mouth Every 6 (Six) Hours As Needed for Mild Pain ., Disp: 30 tablet, Rfl: 1  •  levothyroxine (SYNTHROID, LEVOTHROID) 175 MCG tablet, Take 1 tablet by mouth Daily., Disp: 30 tablet, Rfl: 6  •  losartan-hydrochlorothiazide (HYZAAR) 50-12.5 MG per tablet, Take 1 tablet by mouth Daily., Disp: , Rfl:   •  omeprazole (priLOSEC) 20 MG capsule, Take 20 mg by mouth Daily As Needed (heartburn)., Disp: , Rfl: 5  •  pregabalin (LYRICA) 300 MG capsule, Take 1 capsule by mouth 2 (Two) Times a Day., Disp: 180 capsule, Rfl: 0  •  Semaglutide, 1 MG/DOSE, (Ozempic, 1 MG/DOSE,) 2 MG/1.5ML solution pen-injector, Inject 1 mg under the skin into the appropriate area as directed 1 (One) Time Per Week. (Patient taking differently: Inject 1 mg under the skin into the appropriate area as directed 1 (One) Time Per Week. Sunday\), Disp: 6 pen, Rfl: 3  •  traZODone (DESYREL) 50 MG tablet, Take 1 tablet by mouth every night at bedtime. (Patient taking differently: Take 50 mg by mouth At Night As Needed for Sleep.), Disp: 90 tablet, Rfl: 3  •  vitamin D (ERGOCALCIFEROL) 1.25 MG (56883 UT) capsule capsule, TAKE 1 CAPSULE EVERY 7 DAYS (Patient taking differently: 50,000 Units Every 7 (Seven) Days.), Disp: 12 capsule, Rfl: 3  Current outpatient and discharge medications have been reconciled for the patient.  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)  HISTOLOGIC GRADE: FIGO grade 2 (per previous biopsy)  MYOMETRIAL INVASION (Present/Not identified): Not identified  INVOLVEMENT OF CERVICAL  STROMA: Not identified  ADENOMYOSIS: Present, uninvolved by carcinoma  EXTENT OF INVOLVEMENT OF OTHER TISSUE/ORGANS: Not identified  UTERINE SEROSA INVOLVEMENT: Not identified  PELVIC LYMPH NODES (SUBMITTED/NONE): Submitted  NUMBER OF PELVIC NONSENTINEL LYMPH NODES EXAMINED: 0  NUMBER OF PELVIC SENTINEL NODES EXAMINED: 2  LATERALITY OF PELVIC LYMPH NODES EXAMINED: Bilateral  NUMBER OF PELVIC LYMPH NODES WITH MACROMETASTASIS: 0  NUMBER OF PELVIC LYMPH NODES WITH MICROMETASTASIS: 0  NUMBER OF PELVIC LYMPH NODES WITH ITC’S: 0  LATERALITY OF PELVIC NODES WITH TUMOR: N/A  PARA-AORTIC LYMPH NODES (SUBMITTED/NONE): None submitted  LYMPHVASCULAR INVASION: Not identified  MSI TESTING:   Request on original outside biopsy if required  ADDITIONAL PATHOLOGIC FINDINGS: Extensive adenomyosis; endometriosis  ANCILLARY STUDIES: Cytokeratin immunohistochemical staining of sentinel nodes  AJCC PATHOLOGIC STAGE:  (COMPLETED BY PATHOLOGIST, BASED ONLY ON TISSUE FINDINGS, MORE EXTENSIVE DISEASE MAY NOT BE KNOWN TO THE PATHOLOGIST)  pT=  1a  pN=    0(sn)  AJCC PATHOLOGIC STAGE:  [place]    Operative findings again reviewed. Pathology report discussed.       Assessment / Plan    Ann Galdamez is a 51 y.o. who underwent a robotic-assisted total laparoscopic hysterectomy with bilateral salpingo-oophorectomy and bilateral pelvic sentinel lymph node dissection. She is recovering well from surgery. We discussed continuing postoperative restrictions. Her pathology report was reviewed and demonstrates a Stage IA (0% myometrial invasion), grade 2 endometrial cancer (based on biopsy specimen) with no LVSI. She therefore has a low risk of recurrence. Therefore, surgery is considered definitive and she will require no further therapy. She has been advised that she will require continued surveillance with pelvic examinations, but will no longer require cytology (Pap tests). She was also counseled on possible symptoms of recurrence including  vaginal bleeding, pelvic pain, changes in bowel and bladder symptoms, or shortness of breath. She will follow up in 6 months time, but aware that if she experiences any of the above symptoms that she should return for immediate evaluation.  She will also be scheduled for a Survivorship visit with our APRN in 1 month.      We will request MSI testing on biopsy result to evaluate for possible Trivedi Syndrome.    I spent 10 minutes with the patient in face-to-face discussion and counseling regarding the significance of the pathology report, future treatment options, and surveillance for her newly diagnosed cancer. This is above and beyond her routine post-operative care and separate from the procedure performed.      Assessment/Plan:   Diagnoses and all orders for this visit:    1. Postop check (Primary)    2. Endometrial cancer (HCC)    3. Other specified counseling         Follow Up:   No follow-ups on file.    BRANDIE Liz MD  Gynecologic Oncology

## 2022-01-21 RX ORDER — CYANOCOBALAMIN 1000 UG/ML
INJECTION, SOLUTION INTRAMUSCULAR; SUBCUTANEOUS
Qty: 3 ML | Refills: 3 | Status: SHIPPED | OUTPATIENT
Start: 2022-01-21 | End: 2022-12-23

## 2022-02-16 ENCOUNTER — OFFICE VISIT (OUTPATIENT)
Dept: ENDOCRINOLOGY | Facility: CLINIC | Age: 52
End: 2022-02-16

## 2022-02-16 ENCOUNTER — LAB (OUTPATIENT)
Dept: LAB | Facility: HOSPITAL | Age: 52
End: 2022-02-16

## 2022-02-16 VITALS
SYSTOLIC BLOOD PRESSURE: 124 MMHG | WEIGHT: 203.7 LBS | HEIGHT: 64 IN | HEART RATE: 74 BPM | DIASTOLIC BLOOD PRESSURE: 74 MMHG | BODY MASS INDEX: 34.78 KG/M2 | OXYGEN SATURATION: 95 %

## 2022-02-16 DIAGNOSIS — E11.42 TYPE 2 DIABETES MELLITUS WITH DIABETIC POLYNEUROPATHY, WITH LONG-TERM CURRENT USE OF INSULIN: ICD-10-CM

## 2022-02-16 DIAGNOSIS — E03.9 ACQUIRED HYPOTHYROIDISM: ICD-10-CM

## 2022-02-16 DIAGNOSIS — E11.65 UNCONTROLLED TYPE 2 DIABETES MELLITUS WITH HYPERGLYCEMIA: Primary | ICD-10-CM

## 2022-02-16 DIAGNOSIS — Z79.4 TYPE 2 DIABETES MELLITUS WITH DIABETIC POLYNEUROPATHY, WITH LONG-TERM CURRENT USE OF INSULIN: ICD-10-CM

## 2022-02-16 LAB
ALBUMIN SERPL-MCNC: 4.6 G/DL (ref 3.5–5.2)
ALBUMIN/GLOB SERPL: 1.8 G/DL
ALP SERPL-CCNC: 58 U/L (ref 39–117)
ALT SERPL W P-5'-P-CCNC: 5 U/L (ref 1–33)
ANION GAP SERPL CALCULATED.3IONS-SCNC: 10.8 MMOL/L (ref 5–15)
AST SERPL-CCNC: 10 U/L (ref 1–32)
BILIRUB SERPL-MCNC: 0.3 MG/DL (ref 0–1.2)
BUN SERPL-MCNC: 15 MG/DL (ref 6–20)
BUN/CREAT SERPL: 12.5 (ref 7–25)
CALCIUM SPEC-SCNC: 9.8 MG/DL (ref 8.6–10.5)
CHLORIDE SERPL-SCNC: 97 MMOL/L (ref 98–107)
CO2 SERPL-SCNC: 29.2 MMOL/L (ref 22–29)
CREAT SERPL-MCNC: 1.2 MG/DL (ref 0.57–1)
EXPIRATION DATE: ABNORMAL
EXPIRATION DATE: NORMAL
GFR SERPL CREATININE-BSD FRML MDRD: 47 ML/MIN/1.73
GLOBULIN UR ELPH-MCNC: 2.6 GM/DL
GLUCOSE BLDC GLUCOMTR-MCNC: 171 MG/DL (ref 70–130)
GLUCOSE SERPL-MCNC: 158 MG/DL (ref 65–99)
HBA1C MFR BLD: 5.9 %
Lab: ABNORMAL
Lab: NORMAL
POTASSIUM SERPL-SCNC: 4.1 MMOL/L (ref 3.5–5.2)
PROT SERPL-MCNC: 7.2 G/DL (ref 6–8.5)
SODIUM SERPL-SCNC: 137 MMOL/L (ref 136–145)
T4 FREE SERPL-MCNC: 1.12 NG/DL (ref 0.93–1.7)
TSH SERPL DL<=0.05 MIU/L-ACNC: 31.7 UIU/ML (ref 0.27–4.2)

## 2022-02-16 PROCEDURE — 99214 OFFICE O/P EST MOD 30 MIN: CPT | Performed by: INTERNAL MEDICINE

## 2022-02-16 PROCEDURE — 82947 ASSAY GLUCOSE BLOOD QUANT: CPT | Performed by: INTERNAL MEDICINE

## 2022-02-16 PROCEDURE — 84439 ASSAY OF FREE THYROXINE: CPT | Performed by: INTERNAL MEDICINE

## 2022-02-16 PROCEDURE — 83036 HEMOGLOBIN GLYCOSYLATED A1C: CPT | Performed by: INTERNAL MEDICINE

## 2022-02-16 PROCEDURE — 84443 ASSAY THYROID STIM HORMONE: CPT | Performed by: INTERNAL MEDICINE

## 2022-02-16 PROCEDURE — 82306 VITAMIN D 25 HYDROXY: CPT | Performed by: INTERNAL MEDICINE

## 2022-02-16 PROCEDURE — 80053 COMPREHEN METABOLIC PANEL: CPT | Performed by: INTERNAL MEDICINE

## 2022-02-16 RX ORDER — TRAZODONE HYDROCHLORIDE 50 MG/1
50 TABLET ORAL NIGHTLY PRN
Qty: 90 TABLET | Refills: 0 | Status: SHIPPED | OUTPATIENT
Start: 2022-02-16 | End: 2022-04-29

## 2022-02-16 RX ORDER — SYRINGE W-NEEDLE,DISPOSAB,3 ML 25GX5/8"
3 SYRINGE, EMPTY DISPOSABLE MISCELLANEOUS
Qty: 3 EACH | Refills: 3 | Status: SHIPPED | OUTPATIENT
Start: 2022-02-16

## 2022-02-16 RX ORDER — DULOXETIN HYDROCHLORIDE 60 MG/1
CAPSULE, DELAYED RELEASE ORAL
COMMUNITY
Start: 2022-02-09

## 2022-02-16 RX ORDER — ERGOCALCIFEROL 1.25 MG/1
50000 CAPSULE ORAL
Qty: 12 CAPSULE | Refills: 1 | Status: SHIPPED | OUTPATIENT
Start: 2022-02-16

## 2022-02-16 RX ORDER — OMEPRAZOLE 20 MG/1
20 CAPSULE, DELAYED RELEASE ORAL DAILY
Qty: 90 CAPSULE | Refills: 3 | Status: SHIPPED | OUTPATIENT
Start: 2022-02-16 | End: 2023-01-18

## 2022-02-16 RX ORDER — PREGABALIN 300 MG/1
300 CAPSULE ORAL 2 TIMES DAILY
Qty: 180 CAPSULE | Refills: 0 | Status: SHIPPED | OUTPATIENT
Start: 2022-02-16 | End: 2022-04-26 | Stop reason: SDUPTHER

## 2022-02-16 RX ORDER — PREGABALIN 300 MG/1
300 CAPSULE ORAL 2 TIMES DAILY
Qty: 180 CAPSULE | Refills: 0 | Status: SHIPPED | OUTPATIENT
Start: 2022-02-16 | End: 2022-02-16 | Stop reason: SDUPTHER

## 2022-02-16 NOTE — PROGRESS NOTES
Chief complaint  Diabetes (Follow UP;   Had a total Hysterectomy inDecember, and hormone levels have been off ) and Hypothyroidism    Subjective   Ann Galdamez is a 51 y.o. female is here today for follow-up.  Follow-up for DM type 2 dx 2012. After gastric sleeve surgery she has lost 35 lbs and came off all her diabetes medications, including insulin, victoza, metformin.   Complications: sx of neuropathy that persisted after diabetes controlled improved  Takes Lyrica 300 mg BID.   Current meds: Ozempic     Hypothyroidism dx 2006. In July 2014 [place] levothyroxine combo was transitioned to levothyroxine. Current dose was 250 mcg   TSH was 50 after gastric sleeve surgery and the levels improved on repeat testing. Last labs in 1/2019 - TSH of 8 and the dose increased to 275 mcg.   TSH was 91.9 and in August 2019 and we have transitioned to Tirosint 250 mcg daily.     Vit D deficiency - on supplements.     Patient had hysterectomy with oophorectomy for endometrial cancer. She experiences hot flushes and mood swings, multiple symptoms.       Medications    Current Outpatient Medications:   •  CRANBERRY PO, Take 1 capsule by mouth daily., Disp: , Rfl:   •  cyanocobalamin 1000 MCG/ML injection, INJECT 1 ML UNDER THE SKIN INTO THE APPROPRIATE AREA AS DIRECTED EVERY 28 DAYS, Disp: 3 mL, Rfl: 3  •  docusate sodium (COLACE) 100 MG capsule, Take 2 capsules by mouth 2 (Two) Times a Day., Disp: 120 capsule, Rfl: 1  •  DULoxetine (CYMBALTA) 60 MG capsule, , Disp: , Rfl:   •  HYDROcodone-acetaminophen (NORCO) 5-325 MG per tablet, Take 1 tablet by mouth Every 6 (Six) Hours As Needed for Moderate Pain ., Disp: , Rfl:   •  ibuprofen (ADVIL,MOTRIN) 600 MG tablet, Take 1 tablet by mouth Every 6 (Six) Hours As Needed for Mild Pain ., Disp: 30 tablet, Rfl: 1  •  levothyroxine (SYNTHROID, LEVOTHROID) 175 MCG tablet, Take 1 tablet by mouth Daily., Disp: 30 tablet, Rfl: 6  •  losartan-hydrochlorothiazide (HYZAAR) 50-12.5 MG per  tablet, Take 1 tablet by mouth Daily., Disp: , Rfl:   •  omeprazole (priLOSEC) 20 MG capsule, Take 20 mg by mouth Daily As Needed (heartburn)., Disp: , Rfl: 5  •  pregabalin (LYRICA) 300 MG capsule, Take 1 capsule by mouth 2 (Two) Times a Day., Disp: 180 capsule, Rfl: 0  •  Semaglutide, 1 MG/DOSE, (Ozempic, 1 MG/DOSE,) 2 MG/1.5ML solution pen-injector, Inject 1 mg under the skin into the appropriate area as directed 1 (One) Time Per Week. (Patient taking differently: Inject 1 mg under the skin into the appropriate area as directed 1 (One) Time Per Week. Sunday\), Disp: 6 pen, Rfl: 3  •  traZODone (DESYREL) 50 MG tablet, Take 1 tablet by mouth every night at bedtime. (Patient taking differently: Take 50 mg by mouth At Night As Needed for Sleep.), Disp: 90 tablet, Rfl: 3  •  vitamin D (ERGOCALCIFEROL) 1.25 MG (78649 UT) capsule capsule, TAKE 1 CAPSULE EVERY 7 DAYS (Patient taking differently: 50,000 Units Every 7 (Seven) Days.), Disp: 12 capsule, Rfl: 3      Review of systems  Review of Systems   Constitutional: Positive for unexpected weight change (weight gain). Negative for activity change, appetite change, chills and diaphoresis.   HENT: Negative for congestion, ear pain, facial swelling, hearing loss, postnasal drip, sore throat, trouble swallowing and voice change.    Eyes: Negative.  Negative for redness, itching and visual disturbance.   Respiratory: Negative for cough and shortness of breath.    Cardiovascular: Negative for palpitations and leg swelling.   Gastrointestinal: Negative for abdominal distention, abdominal pain, constipation, diarrhea, nausea and vomiting.   Endocrine: Positive for heat intolerance.        As listed in HPI   Genitourinary: Negative.    Musculoskeletal: Positive for arthralgias. Negative for back pain, joint swelling, myalgias, neck pain and neck stiffness.   Skin: Positive for rash.   Allergic/Immunologic: Negative.    Neurological: Positive for numbness ( ). Negative for syncope  "and light-headedness.   Hematological: Negative.    Psychiatric/Behavioral: Negative.    All other systems reviewed and are negative.      Physical exam  Objective   Blood pressure 124/74, pulse 74, height 162.6 cm (64.02\"), weight 92.4 kg (203 lb 11.2 oz), SpO2 95 %.   Physical Exam   Constitutional: She is oriented to person, place, and time. She appears well-developed.   HENT:   Head: Normocephalic and atraumatic.   Eyes: Conjunctivae are normal.   Neck: No thyromegaly present.   The neck is supple and symmetric   Cardiovascular: Normal rate, regular rhythm, normal heart sounds and normal pulses.   Pulmonary/Chest: Effort normal and breath sounds normal.   Musculoskeletal: Normal range of motion.   Neurological: She is alert and oriented to person, place, and time. She has normal reflexes.   Psychiatric: Thought content normal.   Vitals reviewed.      Results for orders placed or performed in visit on 02/16/22   POC Glycosylated Hemoglobin (Hb A1C)    Specimen: Blood   Result Value Ref Range    Hemoglobin A1C 5.9 %    Lot Number [id number]     Expiration Date 09/28/2023    POC Glucose, Blood    Specimen: Blood   Result Value Ref Range    Glucose 171 (A) 70 - 130 mg/dL    Lot Number [id number]     Expiration Date 08/25/2022      Lab Results   Component Value Date    HGBA1C 5.9 02/16/2022    HGBA1C 5.90 (H) 12/27/2021    HGBA1C 6.1 09/15/2021     Lab Results   Component Value Date    MICROALBUR 5.0 05/22/2018    CREATININE 1.05 (H) 12/27/2021       Assessment  Assessment/Plan   Problems Addressed this Visit        Other    Diabetes mellitus with diabetic polyneuropathy, with long-term current use of insulin (HCC)    Hypothyroidism    Type 2 diabetes mellitus, uncontrolled (HCC) - Primary    Relevant Orders    POC Glycosylated Hemoglobin (Hb A1C) (Completed)    POC Glucose, Blood (Completed)      Diagnoses       Codes Comments    Uncontrolled type 2 diabetes mellitus with hyperglycemia (HCC)    -  Primary ICD-10-CM: " E11.65  ICD-9-CM: 250.02     Type 2 diabetes mellitus with diabetic polyneuropathy, with long-term current use of insulin (HCC)     ICD-10-CM: E11.42, Z79.4  ICD-9-CM: 250.60, 357.2, V58.67     Acquired hypothyroidism     ICD-10-CM: E03.9  ICD-9-CM: 244.9           Plan  1. Levothyroxine 175 mcg. Repeat labs today for dose adjustment.   2. Diabetes mellitus type 2 .   - Ozempic.    -Continue with diet. A1C is well controlled.     3. Vitamin D deficiency -  Cont supplements. Vit D level is normal.   I have explained that due o dx of endometrial cancer HRT is not indicated. Advised to discuss with surgical oncologist the otpions for symptoms tx.     Follow-up in 6 months

## 2022-02-17 LAB — 25(OH)D3 SERPL-MCNC: 26.7 NG/ML

## 2022-03-01 RX ORDER — LEVOTHYROXINE SODIUM 0.2 MG/1
200 TABLET ORAL DAILY
Qty: 30 TABLET | Refills: 5 | Status: SHIPPED | OUTPATIENT
Start: 2022-03-01 | End: 2022-03-02 | Stop reason: ALTCHOICE

## 2022-03-02 RX ORDER — LEVOTHYROXINE SODIUM 200 UG/1
1 CAPSULE ORAL DAILY
Qty: 90 CAPSULE | Refills: 1 | Status: SHIPPED | OUTPATIENT
Start: 2022-03-02 | End: 2023-03-09

## 2022-03-02 RX ORDER — LEVOTHYROXINE SODIUM 200 UG/1
1 CAPSULE ORAL DAILY
Qty: 90 CAPSULE | Refills: 1 | Status: CANCELLED | OUTPATIENT
Start: 2022-03-02

## 2022-03-07 ENCOUNTER — TELEPHONE (OUTPATIENT)
Dept: ENDOCRINOLOGY | Facility: CLINIC | Age: 52
End: 2022-03-07

## 2022-03-07 NOTE — TELEPHONE ENCOUNTER
[facility] CALLED STATING TIROSINT IS NOT COVERED. THEY WANTED T KNOW IF THEY CAN SWITCH MED TO LEVOTHYROXINE/SYNTHROID TABS. PLEASE CONTACT THEM @ PHONE # [phone] REF # [id number]

## 2022-03-07 NOTE — TELEPHONE ENCOUNTER
PA Approved CaseId:[id number];Status:Approved;Review Type:Prior Auth;Coverage Start Date:02/05/2022;Coverage End Date:03/07/2023;

## 2022-03-07 NOTE — TELEPHONE ENCOUNTER
Returned [facility] call. Advised that we would like to attempt a PA.  They will initiate PA CMM   Key: [id number]  [phone]

## 2022-04-26 DIAGNOSIS — E11.65 UNCONTROLLED TYPE 2 DIABETES MELLITUS WITH HYPERGLYCEMIA: ICD-10-CM

## 2022-04-26 RX ORDER — PREGABALIN 300 MG/1
300 CAPSULE ORAL 2 TIMES DAILY
Qty: 180 CAPSULE | Refills: 0 | Status: SHIPPED | OUTPATIENT
Start: 2022-04-26 | End: 2022-05-27 | Stop reason: SDUPTHER

## 2022-04-29 RX ORDER — TRAZODONE HYDROCHLORIDE 50 MG/1
TABLET ORAL
Qty: 90 TABLET | Refills: 1 | Status: SHIPPED | OUTPATIENT
Start: 2022-04-29 | End: 2022-10-26

## 2022-05-11 RX ORDER — SYRINGE WITH NEEDLE, 1 ML 25GX5/8"
SYRINGE, EMPTY DISPOSABLE MISCELLANEOUS
Qty: 3 EACH | Refills: 3 | Status: SHIPPED | OUTPATIENT
Start: 2022-05-11

## 2022-05-27 DIAGNOSIS — E11.65 UNCONTROLLED TYPE 2 DIABETES MELLITUS WITH HYPERGLYCEMIA: ICD-10-CM

## 2022-05-27 NOTE — TELEPHONE ENCOUNTER
Fax RX refill request from [facility] for Pregablin  Felix updates last OV 2/16/22 Future appt 7/14/22

## 2022-05-31 RX ORDER — PREGABALIN 300 MG/1
300 CAPSULE ORAL 2 TIMES DAILY
Qty: 180 CAPSULE | Refills: 0 | Status: SHIPPED | OUTPATIENT
Start: 2022-05-31 | End: 2022-09-06 | Stop reason: SDUPTHER

## 2022-07-11 NOTE — PROGRESS NOTES
Follow Up Office Visit      Patient Name: Ann Galdamez  : 1970   MRN: [id number]     Chief Complaint:  Endometrial cancer surveillance    History of Present Illness: Ann Galdamez is a 51 y.o. female who is here today to follow up with Gynecologic Oncology for surveillance of previously diagnosed Grade 2 endometrial cancer. She underwent robotic assisted total laparoscopic hysterectomy with bilateral pelvic sentinel lymph node dissection on 2021.    Today, she is doing well overall. She reports a cramping-like discomfort when she urinates, which has been present since surgery 6 months ago. She denies any burning or dysuria, blood in urine, vaginal pain or genital lesions. She does not have abdominal or pelvic pain. She does report that she does not drink as much water as she should. She is not sure how many times a day she urinates, but only believes to be a few times. She was previously going to physical therapy after surgery but the patient's time is more occupied now with having to care for her mother, who is living with her. She denies any vaginal bleeding and discharge.  She continues to do well with a regular diet and good appetite. She denies nausea and vomiting, early satiety and bloating. Denies any CP, SOB, lightheadedness or dizziness. She has had no changes in bowel function.    Oncologic History:  Oncology/Hematology History   Endometrial cancer (HCC)   2021 Initial Diagnosis    Referral [facility]    2021: TVUS with uterus measuring 6.0 x 3.7 x 3.7 cm with an endometrial stripe of 9.8 mm.  Cervix with multiple echogenic foci consistent with possible nabothian cyst.  Right and left ovaries normal.  Small amount of free fluid.  2021: EMB with FIGO grade 2 endometrioid adenocarcinoma     2021 Surgery    Robotic assisted total laparoscopic hysterectomy with bilateral salpingo-oophorectomy and bilateral pelvic sentinel lymph node  "dissection    Pathology with focal residual noninvasive adenocarcinoma.  0% myometrial invasion.  Negative cervix and adnexa.  Negative sentinel lymph nodes.    [facility]        Cancer Staged    Cancer Staging  Endometrial cancer (HCC)  Staging form: Corpus Uteri - Carcinoma And Carcinosarcoma, AJCC 8th Edition  - Pathologic stage from 12/28/2021: FIGO Stage IA (pT1a, pN0(sn), cM0) - Signed by Liane Liz MD on 7/13/2022            I have reviewed and the following portions of the patient's history were updated as appropriate: past family history, past medical history, past social history, past surgical history, past obstetrics/gynecologic history and problem list.    Medications: The current medication list was reviewed with the patient and updated in the EMR this date per the Medical Assistant. Medication dosages and frequencies were confirmed to be accurate.      Allergies:   No Known Allergies    Subjective      Review of Systems:   Review of Systems   Constitutional: Negative for activity change, appetite change, chills and fever.   HENT: Negative.    Respiratory: Negative.    Cardiovascular: Negative.    Gastrointestinal: Negative for abdominal pain, diarrhea, nausea, rectal pain and vomiting.   Endocrine: Negative.    Genitourinary: Positive for pelvic pain, urgency and vaginal pain. Negative for difficulty urinating, dysuria, flank pain, frequency, genital sores and vaginal discharge.   Musculoskeletal: Negative.    Neurological: Negative.    Psychiatric/Behavioral: Negative.         Objective     Physical Exam:  Vital Signs:   Vitals:    07/13/22 1133   BP: 136/76   Pulse: 70   Resp: 15   Temp: 97.3 °F (36.3 °C)   TempSrc: Temporal   Weight: 95.7 kg (210 lb 14.4 oz)   Height: 162.6 cm (64.02\")   PainSc:   7   PainLoc: Hip     BMI: Body mass index is 36.18 kg/m².   ECOG score: 0           PHQ-2 Depression Screening  Little interest or pleasure in doing things? 0-->not " at all   Feeling down, depressed, or hopeless? 0-->not at all   PHQ-2 Total Score 0         Physical Exam  Vitals reviewed. Exam conducted with a chaperone present.   Constitutional:       General: She is not in acute distress.     Appearance: Normal appearance. She is not ill-appearing.   HENT:      Head: Normocephalic and atraumatic.   Cardiovascular:      Rate and Rhythm: Normal rate.      Pulses: Normal pulses.      Heart sounds: Normal heart sounds. No murmur heard.  Pulmonary:      Effort: Pulmonary effort is normal. No respiratory distress.      Breath sounds: Normal breath sounds.   Abdominal:      General: Abdomen is flat. There is no distension.      Palpations: Abdomen is soft. There is no mass.      Tenderness: There is no abdominal tenderness. There is no guarding or rebound.   Genitourinary:     General: Normal vulva.      Exam position: Lithotomy position.      Labia:         Right: No rash, tenderness or lesion.         Left: No rash, tenderness or lesion.       Vagina: Normal. No signs of injury and foreign body. No vaginal discharge, tenderness or lesions.      Comments: Cervix, uterus, and adnexa surgically absent. Vaginal cuff palpated to be intact. No discharge present in posterior vaginal vault.   Neurological:      Mental Status: She is alert.         Assessment / Plan    Ann Galdamez is a 51 y.o. with a history of a stage IA endometrial cancer s/p surgical management (treatment completed in 12/2021).  She is currently without clinical evidence of disease. Signs of recurrent disease, such as vaginal bleeding, persistent abdominopelvic pain, urinary or bowel changes, and shortness of breath were reviewed.  She was advised to follow up immediately if she develops any of the above symptoms. She will follow-up in 6 months. MMR testing to be ordered on biopsies.    Health maintenance: She was reminded to maintain a healthy lifestyle with a well balanced diet, calcium and vitamin D for  osteoporosis prevention, and exercise as well as continue with recommended health and cancer screening guidelines.     Voiding dysfunction: She reports dysuria and pelvic cramping today upon urination. Differential diagnoses include postoperative bladder spasms, UTI. Counseled about bladder training, including increase PO hydration, regular emptying of bladder every 4 hours, continuing Kegel exercises, and avoidance of bladder irritants (I.e. caffeine, chocolate, teas). She was advised to continue Pelvic floor PT as time permits. UA was discussed with her today to rule out UTI, however patient stated that she is unable to provide a urine sample as she recently voided.       Pain assessment was performed today as a part of patient’s care.  For patients with pain related to surgery, gynecologic malignancy or cancer treatment, the plan is as noted in the assessment/plan.  For patients with pain not related to these issues, they are to seek any further needed care from a more appropriate provider, such as PCP.      Diagnoses and all orders for this visit:    1. History of uterine cancer (Primary)    2. Dysuria  -     Urinalysis With Culture If Indicated - Urine, Clean Catch; Future         Follow Up: 6 months     Nicolas Ramires MD   Resident Physician, PGY 3    Patient was seen and examined with Dr. Ramires,  resident, who performed portions of the examination and documentation for this patient's care under my direct supervision.  I agree with the above documentation and plan.    BRANDIE Liz MD  Gynecologic Oncology

## 2022-07-13 ENCOUNTER — OFFICE VISIT (OUTPATIENT)
Dept: GYNECOLOGIC ONCOLOGY | Facility: CLINIC | Age: 52
End: 2022-07-13

## 2022-07-13 VITALS
BODY MASS INDEX: 36 KG/M2 | WEIGHT: 210.9 LBS | HEIGHT: 64 IN | HEART RATE: 70 BPM | DIASTOLIC BLOOD PRESSURE: 76 MMHG | RESPIRATION RATE: 15 BRPM | TEMPERATURE: 97.3 F | SYSTOLIC BLOOD PRESSURE: 136 MMHG

## 2022-07-13 DIAGNOSIS — Z85.42 HISTORY OF UTERINE CANCER: Primary | ICD-10-CM

## 2022-07-13 DIAGNOSIS — R30.0 DYSURIA: ICD-10-CM

## 2022-07-13 DIAGNOSIS — C54.1 ENDOMETRIAL CANCER: Primary | ICD-10-CM

## 2022-07-13 PROCEDURE — 99213 OFFICE O/P EST LOW 20 MIN: CPT | Performed by: OBSTETRICS & GYNECOLOGY

## 2022-07-16 ENCOUNTER — HOSPITAL ENCOUNTER (EMERGENCY)
Age: 52
Discharge: HOME | End: 2022-07-16
Payer: COMMERCIAL

## 2022-07-16 VITALS
HEART RATE: 85 BPM | DIASTOLIC BLOOD PRESSURE: 71 MMHG | OXYGEN SATURATION: 95 % | RESPIRATION RATE: 18 BRPM | TEMPERATURE: 98.7 F | SYSTOLIC BLOOD PRESSURE: 136 MMHG

## 2022-07-16 VITALS
RESPIRATION RATE: 18 BRPM | TEMPERATURE: 98.7 F | DIASTOLIC BLOOD PRESSURE: 71 MMHG | BODY MASS INDEX: 34.3 KG/M2 | OXYGEN SATURATION: 95 % | HEART RATE: 85 BPM | SYSTOLIC BLOOD PRESSURE: 136 MMHG

## 2022-07-16 DIAGNOSIS — Z79.4: ICD-10-CM

## 2022-07-16 DIAGNOSIS — U07.1: Primary | ICD-10-CM

## 2022-07-16 DIAGNOSIS — J02.9: ICD-10-CM

## 2022-07-16 DIAGNOSIS — E11.9: ICD-10-CM

## 2022-07-16 PROCEDURE — G0463 HOSPITAL OUTPT CLINIC VISIT: HCPCS

## 2022-07-16 PROCEDURE — 87880 STREP A ASSAY W/OPTIC: CPT

## 2022-07-16 PROCEDURE — U0003 INFECTIOUS AGENT DETECTION BY NUCLEIC ACID (DNA OR RNA); SEVERE ACUTE RESPIRATORY SYNDROME CORONAVIRUS 2 (SARS-COV-2) (CORONAVIRUS DISEASE [COVID-19]), AMPLIFIED PROBE TECHNIQUE, MAKING USE OF HIGH THROUGHPUT TECHNOLOGIES AS DESCRIBED BY CMS-2020-01-R: HCPCS

## 2022-07-16 PROCEDURE — U0005 INFEC AGEN DETEC AMPLI PROBE: HCPCS

## 2022-07-16 PROCEDURE — 99212 OFFICE O/P EST SF 10 MIN: CPT

## 2022-07-16 PROCEDURE — C9803 HOPD COVID-19 SPEC COLLECT: HCPCS

## 2022-07-19 LAB
CYTO UR: NORMAL
LAB AP CASE REPORT: NORMAL
LAB AP CLINICAL INFORMATION: NORMAL
LAB AP DIAGNOSIS COMMENT: NORMAL
PATH REPORT.ADDENDUM SPEC: NORMAL
PATH REPORT.FINAL DX SPEC: NORMAL
PATH REPORT.GROSS SPEC: NORMAL

## 2022-09-06 DIAGNOSIS — E11.65 UNCONTROLLED TYPE 2 DIABETES MELLITUS WITH HYPERGLYCEMIA: ICD-10-CM

## 2022-09-06 NOTE — TELEPHONE ENCOUNTER
Last OV 2/16/22 NO future appt scheduled will pend with note Last refill without appt.  Felix updated in chart dated: 4/28/22

## 2022-09-07 RX ORDER — PREGABALIN 300 MG/1
300 CAPSULE ORAL 2 TIMES DAILY
Qty: 60 CAPSULE | Refills: 0 | Status: SHIPPED | OUTPATIENT
Start: 2022-09-07 | End: 2022-11-15

## 2022-09-07 NOTE — TELEPHONE ENCOUNTER
Spoke with pt she was unable to make those days, but scheduled an appt in November. She stated she may be able to get her Pain mangmt Dr to fill

## 2022-09-07 NOTE — TELEPHONE ENCOUNTER
She doesn't have appointment scheduled. I can see her at 12 or 2:15 on Sep 9 . I will refil for 1 month and we need to schedule an appointment

## 2022-10-03 RX ORDER — SEMAGLUTIDE 1.34 MG/ML
INJECTION, SOLUTION SUBCUTANEOUS
Qty: 9 ML | Refills: 0 | Status: SHIPPED | OUTPATIENT
Start: 2022-10-03 | End: 2022-11-09 | Stop reason: SDUPTHER

## 2022-10-13 ENCOUNTER — TELEPHONE (OUTPATIENT)
Dept: ENDOCRINOLOGY | Facility: CLINIC | Age: 52
End: 2022-10-13

## 2022-10-13 NOTE — TELEPHONE ENCOUNTER
PATIENT CALLED TODAY REGARDING A RECENT VISIT WITH PAIN MANAGEMENT. PATIENT WAS INFORMED THAT SHE FAILED A DRUG SCREEN DUE TO A FINDING OF LYRICA IN HER SYSTEM. PATIENT WAS ADVISED THAT THIS WAS NOT LISTED ON HER ARACELI. [facility] JUST REFILLED THIS RX FOR PATIENT IN SEPT 2022. LAST ARACELI RAN 4/2022 WITH LYRICA LISTED SEVERAL TIMES. PATIENT IS ASKING THIS OFFICE TO HELP RESOLVE THIS ISSUE.     PAIN MANAGEMENT DR BLAIR CHOWDHURY    CALL BACK [phone] PATIENT

## 2022-10-13 NOTE — TELEPHONE ENCOUNTER
Returned pt call. LMOM advising that I ran an updated Felix and it is listed on there.  I asked her to return my call, and advise what I need to do. I can fax to Pain Management, but if they ran an updated Felix it would have been on there.

## 2022-10-26 RX ORDER — TRAZODONE HYDROCHLORIDE 50 MG/1
TABLET ORAL
Qty: 90 TABLET | Refills: 0 | Status: SHIPPED | OUTPATIENT
Start: 2022-10-26 | End: 2023-01-24

## 2022-11-09 ENCOUNTER — TRANSCRIBE ORDERS (OUTPATIENT)
Dept: ADMINISTRATIVE | Facility: HOSPITAL | Age: 52
End: 2022-11-09

## 2022-11-09 ENCOUNTER — OFFICE VISIT (OUTPATIENT)
Dept: ENDOCRINOLOGY | Facility: CLINIC | Age: 52
End: 2022-11-09

## 2022-11-09 ENCOUNTER — LAB (OUTPATIENT)
Dept: LAB | Facility: HOSPITAL | Age: 52
End: 2022-11-09

## 2022-11-09 VITALS
HEART RATE: 71 BPM | OXYGEN SATURATION: 98 % | HEIGHT: 64 IN | BODY MASS INDEX: 33.97 KG/M2 | DIASTOLIC BLOOD PRESSURE: 74 MMHG | SYSTOLIC BLOOD PRESSURE: 118 MMHG | WEIGHT: 199 LBS

## 2022-11-09 DIAGNOSIS — E03.9 ACQUIRED HYPOTHYROIDISM: ICD-10-CM

## 2022-11-09 DIAGNOSIS — Z12.31 ENCOUNTER FOR SCREENING MAMMOGRAM FOR BREAST CANCER: Primary | ICD-10-CM

## 2022-11-09 DIAGNOSIS — E11.65 UNCONTROLLED TYPE 2 DIABETES MELLITUS WITH HYPERGLYCEMIA: Primary | ICD-10-CM

## 2022-11-09 LAB
ALBUMIN SERPL-MCNC: 4.1 G/DL (ref 3.5–5.2)
ALBUMIN/GLOB SERPL: 1.5 G/DL
ALP SERPL-CCNC: 48 U/L (ref 39–117)
ALT SERPL W P-5'-P-CCNC: 6 U/L (ref 1–33)
ANION GAP SERPL CALCULATED.3IONS-SCNC: 9.2 MMOL/L (ref 5–15)
AST SERPL-CCNC: 12 U/L (ref 1–32)
BILIRUB SERPL-MCNC: 0.4 MG/DL (ref 0–1.2)
BUN SERPL-MCNC: 21 MG/DL (ref 6–20)
BUN/CREAT SERPL: 21.2 (ref 7–25)
CALCIUM SPEC-SCNC: 9.8 MG/DL (ref 8.6–10.5)
CHLORIDE SERPL-SCNC: 100 MMOL/L (ref 98–107)
CHOLEST SERPL-MCNC: 190 MG/DL (ref 0–200)
CO2 SERPL-SCNC: 30.8 MMOL/L (ref 22–29)
CREAT SERPL-MCNC: 0.99 MG/DL (ref 0.57–1)
EGFRCR SERPLBLD CKD-EPI 2021: 68.3 ML/MIN/1.73
EXPIRATION DATE: NORMAL
EXPIRATION DATE: NORMAL
GLOBULIN UR ELPH-MCNC: 2.7 GM/DL
GLUCOSE BLDC GLUCOMTR-MCNC: 88 MG/DL (ref 70–130)
GLUCOSE SERPL-MCNC: 103 MG/DL (ref 65–99)
HBA1C MFR BLD: 6.1 %
HDLC SERPL-MCNC: 48 MG/DL (ref 40–60)
LDLC SERPL CALC-MCNC: 95 MG/DL (ref 0–100)
LDLC/HDLC SERPL: 1.78 {RATIO}
Lab: NORMAL
Lab: NORMAL
POTASSIUM SERPL-SCNC: 4.3 MMOL/L (ref 3.5–5.2)
PROT SERPL-MCNC: 6.8 G/DL (ref 6–8.5)
SODIUM SERPL-SCNC: 140 MMOL/L (ref 136–145)
T4 FREE SERPL-MCNC: 1.11 NG/DL (ref 0.93–1.7)
TRIGL SERPL-MCNC: 282 MG/DL (ref 0–150)
TSH SERPL DL<=0.05 MIU/L-ACNC: 8.48 UIU/ML (ref 0.27–4.2)
VLDLC SERPL-MCNC: 47 MG/DL (ref 5–40)

## 2022-11-09 PROCEDURE — 80061 LIPID PANEL: CPT | Performed by: INTERNAL MEDICINE

## 2022-11-09 PROCEDURE — 84443 ASSAY THYROID STIM HORMONE: CPT | Performed by: INTERNAL MEDICINE

## 2022-11-09 PROCEDURE — 82947 ASSAY GLUCOSE BLOOD QUANT: CPT | Performed by: INTERNAL MEDICINE

## 2022-11-09 PROCEDURE — 99214 OFFICE O/P EST MOD 30 MIN: CPT | Performed by: INTERNAL MEDICINE

## 2022-11-09 PROCEDURE — 80053 COMPREHEN METABOLIC PANEL: CPT | Performed by: INTERNAL MEDICINE

## 2022-11-09 PROCEDURE — 83036 HEMOGLOBIN GLYCOSYLATED A1C: CPT | Performed by: INTERNAL MEDICINE

## 2022-11-09 PROCEDURE — 84439 ASSAY OF FREE THYROXINE: CPT | Performed by: INTERNAL MEDICINE

## 2022-11-09 RX ORDER — SEMAGLUTIDE 2.68 MG/ML
2 INJECTION, SOLUTION SUBCUTANEOUS WEEKLY
Qty: 9 ML | Refills: 3 | Status: SHIPPED | OUTPATIENT
Start: 2022-11-09 | End: 2022-11-29 | Stop reason: DRUGHIGH

## 2022-11-09 RX ORDER — DICLOFENAC SODIUM 75 MG/1
TABLET, DELAYED RELEASE ORAL
COMMUNITY
Start: 2022-10-14

## 2022-11-09 RX ORDER — SEMAGLUTIDE 2.68 MG/ML
2 INJECTION, SOLUTION SUBCUTANEOUS WEEKLY
Qty: 9 ML | Refills: 3 | Status: SHIPPED | OUTPATIENT
Start: 2022-11-09 | End: 2022-11-09 | Stop reason: SDUPTHER

## 2022-11-09 RX ORDER — SEMAGLUTIDE 1.34 MG/ML
1 INJECTION, SOLUTION SUBCUTANEOUS WEEKLY
Qty: 9 ML | Refills: 1 | Status: SHIPPED | OUTPATIENT
Start: 2022-11-09 | End: 2022-11-29 | Stop reason: DRUGHIGH

## 2022-11-09 RX ORDER — PRAVASTATIN SODIUM 20 MG
TABLET ORAL DAILY
COMMUNITY
Start: 2022-09-19

## 2022-11-09 NOTE — PROGRESS NOTES
Chief complaint  Diabetes    Subjective   Ann Galdamez is a 52 y.o. female is here today for follow-up.  Follow-up for DM type 2 dx 2012. After gastric sleeve surgery she has lost 35 lbs and came off all her diabetes medications, including insulin, victoza, metformin.   Complications: sx of neuropathy that persisted after diabetes controlled improved  Takes Lyrica 300 mg BID.   Current meds: Ozempic 1mg weekly      Hypothyroidism dx 2006. Levothyroxine resulted in dose variability due to poor absorption and we have transitioned to Tirosint 200 mcg daily.     Vit D deficiency - on supplements.     She is feeling well on current regimen. Glucose is higher.       Medications    Current Outpatient Medications:   •  CRANBERRY PO, Take 1 capsule by mouth daily., Disp: , Rfl:   •  cyanocobalamin 1000 MCG/ML injection, INJECT 1 ML UNDER THE SKIN INTO THE APPROPRIATE AREA AS DIRECTED EVERY 28 DAYS, Disp: 3 mL, Rfl: 3  •  diclofenac (VOLTAREN) 75 MG EC tablet, TAKE ONE TABLET BY MOUTH EVERY TWELVE HOURS --TAKE WITH FOOD--, Disp: , Rfl:   •  DULoxetine (CYMBALTA) 60 MG capsule, , Disp: , Rfl:   •  HYDROcodone-acetaminophen (NORCO) 5-325 MG per tablet, Take 1 tablet by mouth Every 6 (Six) Hours As Needed for Moderate Pain ., Disp: , Rfl:   •  ibuprofen (ADVIL,MOTRIN) 600 MG tablet, Take 1 tablet by mouth Every 6 (Six) Hours As Needed for Mild Pain ., Disp: 30 tablet, Rfl: 1  •  Levothyroxine Sodium (Tirosint) 200 MCG capsule, Take 1 capsule by mouth Daily., Disp: 90 capsule, Rfl: 1  •  losartan-hydrochlorothiazide (HYZAAR) 50-12.5 MG per tablet, Take 1 tablet by mouth Daily., Disp: , Rfl:   •  omeprazole (priLOSEC) 20 MG capsule, Take 1 capsule by mouth Daily., Disp: 90 capsule, Rfl: 3  •  Ozempic, 1 MG/DOSE, 4 MG/3ML solution pen-injector, INJECT 1 MG UNDER THE SKIN INTO THE APPROPRIATE AREA AS DIRECTED ONCE WEEKLY, Disp: 9 mL, Rfl: 0  •  pravastatin (PRAVACHOL) 20 MG tablet, Daily., Disp: , Rfl:   •  pregabalin  "(LYRICA) 300 MG capsule, Take 1 capsule by mouth 2 (Two) Times a Day. Last refill without appt., Disp: 60 capsule, Rfl: 0  •  Syringe 23G X 1\" 3 ML misc, 3 each Every 28 (Twenty-Eight) Days. Syringe for B12 vitamin administration, Disp: 3 each, Rfl: 3  •  Syringe/Needle, Disp, (B-D 3CC LUER-JORDY SYR 25GX1\") 25G X 1\" 3 ML misc, USE ONCE MONTHLY, Disp: 3 each, Rfl: 3  •  traZODone (DESYREL) 50 MG tablet, TAKE 1 TABLET AT NIGHT AS NEEDED FOR SLEEP, Disp: 90 tablet, Rfl: 0  •  vitamin D (ERGOCALCIFEROL) 1.25 MG (10917 UT) capsule capsule, Take 1 capsule by mouth Every 7 (Seven) Days., Disp: 12 capsule, Rfl: 1      Review of systems  Review of Systems   Constitutional: Positive for fatigue and unexpected weight change.       Physical exam  Objective   Blood pressure 118/74, pulse 71, height 162.6 cm (64\"), weight 90.3 kg (199 lb), SpO2 98 %. Body mass index is 34.16 kg/m².   Physical Exam   Constitutional: She is oriented to person, place, and time. She appears well-developed.   HENT:   Head: Normocephalic and atraumatic.   Eyes: Conjunctivae are normal.   Neck: No thyromegaly present.   The neck is supple and symmetric   Cardiovascular: Normal rate, regular rhythm, normal heart sounds and normal pulses.   Pulmonary/Chest: Effort normal and breath sounds normal.   Musculoskeletal: Normal range of motion.   Neurological: She is alert and oriented to person, place, and time. She has normal reflexes.   Psychiatric: Thought content normal.   Vitals reviewed.      Results for orders placed or performed in visit on 11/09/22   POC Glycosylated Hemoglobin (Hb A1C)    Specimen: Blood   Result Value Ref Range    Hemoglobin A1C 6.1 %    Lot Number [id number]     Expiration Date 7/4/24    POC Glucose, Blood    Specimen: Blood   Result Value Ref Range    Glucose 88 70 - 130 mg/dL    Lot Number [id number]     Expiration Date 5/20/23      Lab Results   Component Value Date    HGBA1C 6.1 11/09/2022    HGBA1C 5.9 02/16/2022    HGBA1C 5.90 " (H) 12/27/2021     Lab Results   Component Value Date    MICROALBUR 5.0 05/22/2018    CREATININE 1.20 (H) 02/16/2022       Assessment  Assessment & Plan   Problems Addressed this Visit        Other    Hypothyroidism    Type 2 diabetes mellitus, uncontrolled - Primary   Diagnoses       Codes Comments    Uncontrolled type 2 diabetes mellitus with hyperglycemia (HCC)    -  Primary ICD-10-CM: E11.65  ICD-9-CM: 250.02     Acquired hypothyroidism     ICD-10-CM: E03.9  ICD-9-CM: 244.9           Plan  1.Tirosint 200 mcg. Repeat labs today for dose adjustment.   2. Diabetes mellitus type 2 .   - Ozempic increased to 2 mg once available on the marker. .    -Continue with diet. A1C is increased. Discussed dietary modifications.   Follow-up in 6 months

## 2022-11-14 DIAGNOSIS — E11.65 UNCONTROLLED TYPE 2 DIABETES MELLITUS WITH HYPERGLYCEMIA: ICD-10-CM

## 2022-11-15 RX ORDER — PREGABALIN 300 MG/1
CAPSULE ORAL
Qty: 60 CAPSULE | Refills: 5 | Status: SHIPPED | OUTPATIENT
Start: 2022-11-15

## 2022-11-16 RX ORDER — LEVOTHYROXINE SODIUM 25 UG/1
25 CAPSULE ORAL DAILY
Qty: 30 CAPSULE | Refills: 11 | Status: SHIPPED | OUTPATIENT
Start: 2022-11-16 | End: 2023-03-20 | Stop reason: CLARIF

## 2022-11-28 ENCOUNTER — TELEPHONE (OUTPATIENT)
Dept: ENDOCRINOLOGY | Facility: CLINIC | Age: 52
End: 2022-11-28

## 2022-11-28 NOTE — TELEPHONE ENCOUNTER
Pt called and said her pharmacy never received the prescription for her ozempic. She said dr mccarthy increased it to 2mg.    [facility]  Phone: [phone] Fax: [phone]      Call patient at    :[phone]

## 2022-11-29 RX ORDER — SEMAGLUTIDE 2.68 MG/ML
2 INJECTION, SOLUTION SUBCUTANEOUS WEEKLY
Qty: 9 ML | Refills: 3 | Status: SHIPPED | OUTPATIENT
Start: 2022-11-29 | End: 2023-04-05 | Stop reason: ALTCHOICE

## 2022-12-20 NOTE — TELEPHONE ENCOUNTER
Rx has been sent to pharmacy with increased quantity  
TRISCIBA/ PATIENT IS RUNNING OUT AND FEELS NOT ENOUGH IS BEING CALLED IN  
[Negative] : Heme/Lymph

## 2022-12-23 RX ORDER — CYANOCOBALAMIN 1000 UG/ML
INJECTION, SOLUTION INTRAMUSCULAR; SUBCUTANEOUS
Qty: 3 ML | Refills: 3 | Status: SHIPPED | OUTPATIENT
Start: 2022-12-23

## 2023-01-12 ENCOUNTER — OFFICE VISIT (OUTPATIENT)
Dept: GYNECOLOGIC ONCOLOGY | Facility: CLINIC | Age: 53
End: 2023-01-12
Payer: COMMERCIAL

## 2023-01-12 VITALS
SYSTOLIC BLOOD PRESSURE: 148 MMHG | OXYGEN SATURATION: 98 % | HEIGHT: 64 IN | TEMPERATURE: 97.1 F | WEIGHT: 201.3 LBS | DIASTOLIC BLOOD PRESSURE: 78 MMHG | RESPIRATION RATE: 17 BRPM | HEART RATE: 81 BPM | BODY MASS INDEX: 34.37 KG/M2

## 2023-01-12 DIAGNOSIS — Z85.42 HISTORY OF UTERINE CANCER: Primary | ICD-10-CM

## 2023-01-12 DIAGNOSIS — R23.2 HOT FLASHES: ICD-10-CM

## 2023-01-12 PROCEDURE — 99214 OFFICE O/P EST MOD 30 MIN: CPT | Performed by: OBSTETRICS & GYNECOLOGY

## 2023-01-12 RX ORDER — CLONIDINE HYDROCHLORIDE 0.1 MG/1
0.1 TABLET ORAL NIGHTLY
Qty: 30 TABLET | Refills: 5 | Status: SHIPPED | OUTPATIENT
Start: 2023-01-12

## 2023-01-12 RX ORDER — LEVOTHYROXINE SODIUM 0.03 MG/1
TABLET ORAL
COMMUNITY
Start: 2022-12-21 | End: 2023-03-13

## 2023-01-12 NOTE — LETTER
2023     Isabella Saldivar MD  [address] [facility]  [address]  [facility] [address]    Patient: Ann Galdamez   YOB: 1970   Date of Visit: 2023       Dear Isabella Saldivar    Ann Galdamez was in my office today. Below is a copy of my note.    If you have questions, please do not hesitate to call me. I look forward to following Ann along with you.         Sincerely,        Liane Liz MD        CC: No Recipients         Follow Up Office Visit      Patient Name: Ann Galdamez  : 1970   MRN: [id number]     Chief Complaint:  Endometrial cancer surveillance    History of Present Illness: Ann Galdamez is a 52 y.o. female who is here today to follow up with Gynecologic Oncology for surveillance of endometrial cancer. Today, she is doing well. She denies vaginal bleeding and discharge. She does not have abdominal or pelvic pain. She is tolerating a regular diet and endorses a normal appetite. She denies nausea and vomiting. She denies early satiety and bloating. Denies any CP, SOB, lightheadedness or dizziness. She denies changes in her bowel/bladder. Reports pain with urination has almost completely resolved. Having hot flashes worse at night.    Oncologic History:  Oncology/Hematology History   Endometrial cancer (HCC)   2021 Initial Diagnosis    Referral [facility]    2021: TVUS with uterus measuring 6.0 x 3.7 x 3.7 cm with an endometrial stripe of 9.8 mm.  Cervix with multiple echogenic foci consistent with possible nabothian cyst.  Right and left ovaries normal.  Small amount of free fluid.  2021: EMB with FIGO grade 2 endometrioid adenocarcinoma     2021 Surgery    Robotic assisted total laparoscopic hysterectomy with bilateral salpingo-oophorectomy and bilateral pelvic sentinel lymph node dissection    Pathology with focal residual noninvasive adenocarcinoma.  0% myometrial invasion.  Negative cervix and adnexa.   "Negative sentinel lymph nodes.    [facility]        Cancer Staged    Cancer Staging  Endometrial cancer (HCC)  Staging form: Corpus Uteri - Carcinoma And Carcinosarcoma, AJCC 8th Edition  - Pathologic stage from 12/28/2021: FIGO Stage IA (pT1a, pN0(sn), cM0) - Signed by Liane Liz MD on 7/13/2022 12/28/2021 Cancer Staged    Staging form: Corpus Uteri - Carcinoma And Carcinosarcoma, AJCC 8th Edition  - Pathologic stage from 12/28/2021: FIGO Stage IA (pT1a, pN0(sn), cM0) - Signed by Liane Liz MD on 7/13/2022          I have reviewed and the following portions of the patient's history were updated as appropriate: past family history, past medical history, past social history, past surgical history, past obstetrics/gynecologic history and problem list.    Medications: The current medication list was reviewed with the patient and updated in the EMR this date per the Medical Assistant. Medication dosages and frequencies were confirmed to be accurate.      Allergies:   No Known Allergies      Objective     Physical Exam:  Vital Signs:   Vitals:    01/12/23 1117   BP: 148/78   Pulse: 81   Resp: 17   Temp: 97.1 °F (36.2 °C)   TempSrc: Temporal   SpO2: 98%   Weight: 91.3 kg (201 lb 4.8 oz)   Height: 162.6 cm (64.02\")   PainSc: 0-No pain     BMI: Body mass index is 34.54 kg/m².   ECOG score: 0           PHQ-2 Depression Screening  Little interest or pleasure in doing things? 0-->not at all   Feeling down, depressed, or hopeless? 0-->not at all   PHQ-2 Total Score 0         Physical Examination:   General appearance - alert, well appearing, and in no distress and oriented to person, place, and time  Mental status - normal mood, behavior, speech, dress, motor activity, and thought processes  Eyes - sclera anicteric, left eye normal, right eye normal  Ears - right ear normal, left ear normal  Nose - mask  Mouth - mask  Lymphatics - no palpable lymphadenopathy, no " hepatosplenomegaly  Lungs - normal respiratory effort, clear to auscultation  Heart - normal rate, regular rhythm, normal S1, S2, no murmurs, rubs, clicks or gallops  Abdomen - soft, nontender, nondistended, no masses or organomegaly  Pelvic - external genitalia normal, vagina without discharge or lesions, cuff intact, cervix,uterus and adnexa surgically absent, no palpable masses, RV septum smooth  Neurological - alert, oriented, normal speech, no focal findings or movement disorder noted  Musculoskeletal - no joint tenderness, deformity or swelling  Extremities - peripheral pulses normal, no pedal edema, no clubbing or cyanosis  Skin - normal coloration and turgor, no rashes, no suspicious skin lesions noted       Assessment / Plan    Ann Galdamez is a 52 y.o. with a history of a stage IA endometrial cancer s/p surgical management (treatment completed in 12/2021).  She is currently without clinical evidence of disease. Signs of recurrent disease, such as vaginal bleeding, persistent abdominopelvic pain, urinary or bowel changes, and shortness of breath were reviewed.  She was advised to follow up immediately if she develops any of the above symptoms. She will follow-up in 6 months.     Health maintenance: She was reminded to maintain a healthy lifestyle with a well balanced diet, calcium and vitamin D for osteoporosis prevention, and exercise as well as continue with recommended health and cancer screening guidelines.     Hot flushes: Already on pregabalin and duloxetine for pre-existing neuropathy. Prefers to avoid hormones at this time. Will try clonidine 0.1 mg at night. Discussed risk of rebound hypertension.       Pain assessment was performed today as a part of patient’s care.  For patients with pain related to surgery, gynecologic malignancy or cancer treatment, the plan is as noted in the assessment/plan.  For patients with pain not related to these issues, they are to seek any further needed care  from a more appropriate provider, such as PCP.      Diagnoses and all orders for this visit:    1. History of uterine cancer (Primary)    2. Hot flashes    Other orders  -     cloNIDine (Catapres) 0.1 MG tablet; Take 1 tablet by mouth Every Night.  Dispense: 30 tablet; Refill: 5         Follow Up: 6 months     BRANDIE Liz MD  Gynecologic Oncology

## 2023-01-12 NOTE — PROGRESS NOTES
Follow Up Office Visit      Patient Name: Ann Galdamez  : 1970   MRN: [id number]     Chief Complaint:  Endometrial cancer surveillance    History of Present Illness: Ann Galdamez is a 52 y.o. female who is here today to follow up with Gynecologic Oncology for surveillance of endometrial cancer. Today, she is doing well. She denies vaginal bleeding and discharge. She does not have abdominal or pelvic pain. She is tolerating a regular diet and endorses a normal appetite. She denies nausea and vomiting. She denies early satiety and bloating. Denies any CP, SOB, lightheadedness or dizziness. She denies changes in her bowel/bladder. Reports pain with urination has almost completely resolved. Having hot flashes worse at night.    Oncologic History:  Oncology/Hematology History   Endometrial cancer (HCC)   2021 Initial Diagnosis    Referral [facility]    2021: TVUS with uterus measuring 6.0 x 3.7 x 3.7 cm with an endometrial stripe of 9.8 mm.  Cervix with multiple echogenic foci consistent with possible nabothian cyst.  Right and left ovaries normal.  Small amount of free fluid.  2021: EMB with FIGO grade 2 endometrioid adenocarcinoma     2021 Surgery    Robotic assisted total laparoscopic hysterectomy with bilateral salpingo-oophorectomy and bilateral pelvic sentinel lymph node dissection    Pathology with focal residual noninvasive adenocarcinoma.  0% myometrial invasion.  Negative cervix and adnexa.  Negative sentinel lymph nodes.    [facility]        Cancer Staged    Cancer Staging  Endometrial cancer (HCC)  Staging form: Corpus Uteri - Carcinoma And Carcinosarcoma, AJCC 8th Edition  - Pathologic stage from 2021: FIGO Stage IA (pT1a, pN0(sn), cM0) - Signed by Liane Liz MD on 2021 Cancer Staged    Staging form: Corpus Uteri - Carcinoma And Carcinosarcoma, AJCC 8th Edition  - Pathologic  "stage from 12/28/2021: FIGO Stage IA (pT1a, pN0(sn), cM0) - Signed by Liane Liz MD on 7/13/2022          I have reviewed and the following portions of the patient's history were updated as appropriate: past family history, past medical history, past social history, past surgical history, past obstetrics/gynecologic history and problem list.    Medications: The current medication list was reviewed with the patient and updated in the EMR this date per the Medical Assistant. Medication dosages and frequencies were confirmed to be accurate.      Allergies:   No Known Allergies      Objective     Physical Exam:  Vital Signs:   Vitals:    01/12/23 1117   BP: 148/78   Pulse: 81   Resp: 17   Temp: 97.1 °F (36.2 °C)   TempSrc: Temporal   SpO2: 98%   Weight: 91.3 kg (201 lb 4.8 oz)   Height: 162.6 cm (64.02\")   PainSc: 0-No pain     BMI: Body mass index is 34.54 kg/m².   ECOG score: 0           PHQ-2 Depression Screening  Little interest or pleasure in doing things? 0-->not at all   Feeling down, depressed, or hopeless? 0-->not at all   PHQ-2 Total Score 0         Physical Examination:   General appearance - alert, well appearing, and in no distress and oriented to person, place, and time  Mental status - normal mood, behavior, speech, dress, motor activity, and thought processes  Eyes - sclera anicteric, left eye normal, right eye normal  Ears - right ear normal, left ear normal  Nose - mask  Mouth - mask  Lymphatics - no palpable lymphadenopathy, no hepatosplenomegaly  Lungs - normal respiratory effort, clear to auscultation  Heart - normal rate, regular rhythm, normal S1, S2, no murmurs, rubs, clicks or gallops  Abdomen - soft, nontender, nondistended, no masses or organomegaly  Pelvic - external genitalia normal, vagina without discharge or lesions, cuff intact, cervix,uterus and adnexa surgically absent, no palpable masses, RV septum smooth  Neurological - alert, oriented, normal speech, no focal findings or " movement disorder noted  Musculoskeletal - no joint tenderness, deformity or swelling  Extremities - peripheral pulses normal, no pedal edema, no clubbing or cyanosis  Skin - normal coloration and turgor, no rashes, no suspicious skin lesions noted       Assessment / Plan    Ann Galdamez is a 52 y.o. with a history of a stage IA endometrial cancer s/p surgical management (treatment completed in 12/2021).  She is currently without clinical evidence of disease. Signs of recurrent disease, such as vaginal bleeding, persistent abdominopelvic pain, urinary or bowel changes, and shortness of breath were reviewed.  She was advised to follow up immediately if she develops any of the above symptoms. She will follow-up in 6 months.     Health maintenance: She was reminded to maintain a healthy lifestyle with a well balanced diet, calcium and vitamin D for osteoporosis prevention, and exercise as well as continue with recommended health and cancer screening guidelines.     Hot flushes: Already on pregabalin and duloxetine for pre-existing neuropathy. Prefers to avoid hormones at this time. Will try clonidine 0.1 mg at night. Discussed risk of rebound hypertension.       Pain assessment was performed today as a part of patient’s care.  For patients with pain related to surgery, gynecologic malignancy or cancer treatment, the plan is as noted in the assessment/plan.  For patients with pain not related to these issues, they are to seek any further needed care from a more appropriate provider, such as PCP.      Diagnoses and all orders for this visit:    1. History of uterine cancer (Primary)    2. Hot flashes    Other orders  -     cloNIDine (Catapres) 0.1 MG tablet; Take 1 tablet by mouth Every Night.  Dispense: 30 tablet; Refill: 5         Follow Up: 6 months     BRANDIE Liz MD  Gynecologic Oncology

## 2023-01-18 RX ORDER — OMEPRAZOLE 20 MG/1
CAPSULE, DELAYED RELEASE ORAL
Qty: 90 CAPSULE | Refills: 1 | Status: SHIPPED | OUTPATIENT
Start: 2023-01-18

## 2023-01-24 RX ORDER — TRAZODONE HYDROCHLORIDE 50 MG/1
TABLET ORAL
Qty: 90 TABLET | Refills: 1 | Status: SHIPPED | OUTPATIENT
Start: 2023-01-24

## 2023-01-25 ENCOUNTER — HOSPITAL ENCOUNTER (OUTPATIENT)
Dept: MAMMOGRAPHY | Facility: HOSPITAL | Age: 53
Discharge: HOME | End: 2023-01-25
Admitting: FAMILY MEDICINE
Payer: COMMERCIAL

## 2023-01-25 DIAGNOSIS — Z12.31 ENCOUNTER FOR SCREENING MAMMOGRAM FOR BREAST CANCER: ICD-10-CM

## 2023-01-25 PROCEDURE — 77067 SCR MAMMO BI INCL CAD: CPT

## 2023-01-25 PROCEDURE — 77063 BREAST TOMOSYNTHESIS BI: CPT | Performed by: RADIOLOGY

## 2023-01-25 PROCEDURE — 77063 BREAST TOMOSYNTHESIS BI: CPT

## 2023-01-25 PROCEDURE — 77067 SCR MAMMO BI INCL CAD: CPT | Performed by: RADIOLOGY

## 2023-01-26 ENCOUNTER — TELEPHONE (OUTPATIENT)
Dept: ENDOCRINOLOGY | Facility: CLINIC | Age: 53
End: 2023-01-26
Payer: COMMERCIAL

## 2023-01-26 NOTE — TELEPHONE ENCOUNTER
Patient called about her ozempic rx. She stated her pharmacy us out of her current dose and wanted to know if Dr. Velasco would send in rx for 1MG where she could just take 2 shots? Please advise.     [facility]

## 2023-01-28 NOTE — TELEPHONE ENCOUNTER
I have sent the prescription for Ozempic 1 mg to the [facility]. You can use 1 mg weekly for now until 2 mg available again

## 2023-02-20 RX ORDER — ERGOCALCIFEROL 1.25 MG/1
CAPSULE ORAL
Qty: 12 CAPSULE | Refills: 3 | OUTPATIENT
Start: 2023-02-20

## 2023-03-02 RX ORDER — SEMAGLUTIDE 1.34 MG/ML
INJECTION, SOLUTION SUBCUTANEOUS
Qty: 9 ML | Refills: 1 | Status: SHIPPED | OUTPATIENT
Start: 2023-03-02 | End: 2023-04-05 | Stop reason: ALTCHOICE

## 2023-03-09 ENCOUNTER — TELEPHONE (OUTPATIENT)
Dept: ENDOCRINOLOGY | Facility: CLINIC | Age: 53
End: 2023-03-09
Payer: COMMERCIAL

## 2023-03-09 RX ORDER — LEVOTHYROXINE SODIUM 200 UG/1
CAPSULE ORAL
Qty: 90 CAPSULE | Refills: 1 | Status: SHIPPED | OUTPATIENT
Start: 2023-03-09 | End: 2023-03-20 | Stop reason: CLARIF

## 2023-03-09 NOTE — TELEPHONE ENCOUNTER
[facility] called states insurance not longer covers Tirosint 200 mcg what is covered under her plan is Levothyroxine. Prescription was sent today.

## 2023-03-10 ENCOUNTER — TELEPHONE (OUTPATIENT)
Dept: ENDOCRINOLOGY | Facility: CLINIC | Age: 53
End: 2023-03-10
Payer: COMMERCIAL

## 2023-03-10 NOTE — TELEPHONE ENCOUNTER
[facility] called regarding this patietns Tirosint.  They stated that the patients insurance does not cover the prescription. They are wanting to know if the order can be changed to synthroid.  They would like a call back at [phone]. Thank you!

## 2023-03-13 ENCOUNTER — TELEPHONE (OUTPATIENT)
Dept: ENDOCRINOLOGY | Facility: CLINIC | Age: 53
End: 2023-03-13
Payer: COMMERCIAL

## 2023-03-13 RX ORDER — LEVOTHYROXINE SODIUM 200 MCG
200 TABLET ORAL DAILY
Qty: 30 TABLET | Refills: 11 | Status: SHIPPED | OUTPATIENT
Start: 2023-03-13 | End: 2023-03-20 | Stop reason: CLARIF

## 2023-03-13 RX ORDER — LEVOTHYROXINE SODIUM 25 MCG
25 TABLET ORAL DAILY
Qty: 90 TABLET | Refills: 1 | Status: SHIPPED | OUTPATIENT
Start: 2023-03-13 | End: 2023-03-20 | Stop reason: CLARIF

## 2023-03-13 NOTE — TELEPHONE ENCOUNTER
PLEASE CALL [facility]  [phone] REF # [id number]    INSURANCE IS NOT WANTING TO COVER THE TIROSINT THEY WILL COVER THE L THYROXINE  IS THIS  OK

## 2023-03-14 ENCOUNTER — TELEPHONE (OUTPATIENT)
Dept: ENDOCRINOLOGY | Facility: CLINIC | Age: 53
End: 2023-03-14
Payer: COMMERCIAL

## 2023-03-14 NOTE — TELEPHONE ENCOUNTER
I have sent to her pharmacy SYnthroid 225 mcg daily. In the past we couldn't control her thyroid function with levothyroxine/synthroid. Only after Tirosint start her levels improved.

## 2023-03-14 NOTE — TELEPHONE ENCOUNTER
[facility] CALLED ON BEHALF OF THIS PT REQUESTING TO REMOVE THE DWI TO DISPENSE SYNTHROID AS GENERIC. I TOLD THEM IT WAS FINE TO DISPENSE SYNTHROID AS GENERIC AS THE RX WAS WRITTEN FOR SYNTHROID. I WOULD NOT PROVIDE MY LAST NAME AND THE REP TOLD ME SHE WOULD HOLD THE RX UNTIL WE REACH OUT TO THEM AND HUNG UP ON ME.

## 2023-03-15 ENCOUNTER — TELEPHONE (OUTPATIENT)
Dept: ENDOCRINOLOGY | Facility: CLINIC | Age: 53
End: 2023-03-15
Payer: COMMERCIAL

## 2023-03-15 NOTE — TELEPHONE ENCOUNTER
[facility] called about this patient. They are wanting us to send in rx for synthroid? Ref # [id number] and call back # [phone]. Please advise.

## 2023-03-17 ENCOUNTER — PRIOR AUTHORIZATION (OUTPATIENT)
Dept: ENDOCRINOLOGY | Facility: CLINIC | Age: 53
End: 2023-03-17
Payer: COMMERCIAL

## 2023-03-17 NOTE — TELEPHONE ENCOUNTER
PA for TIrosint 200 mcg capsules has been denied. Wants patient to have tried and failed four preferred formulary alternatives before covering Tirosint. Formulary drugs are Euthyrox, Levothyroxine sodium, Levoxyl, Unithroid.

## 2023-03-20 RX ORDER — LEVOTHYROXINE SODIUM 0.03 MG/1
25 TABLET ORAL
Qty: 90 TABLET | Refills: 1 | Status: SHIPPED | OUTPATIENT
Start: 2023-03-20

## 2023-03-20 RX ORDER — LEVOTHYROXINE SODIUM 0.2 MG/1
200 TABLET ORAL DAILY
Qty: 90 TABLET | Refills: 1 | Status: SHIPPED | OUTPATIENT
Start: 2023-03-20

## 2023-03-20 NOTE — TELEPHONE ENCOUNTER
Deniedtoday  CaseId:[id number];Status:Denied;Review Type:Prior Auth;Appeal Information: Attention:ATTN: CLINICAL APPEALS DEPARTMENT [facility] [address] [address],[place], [place],[address] Phone:[phone] Fax:[phone]; Important - Please read the below note on eAppeals: Please reference the denial letter for information on the rights for an appeal, rationale for the denial, and how to submit an appeal including if any information is needed to support the appeal. Note about urgent situations - Generally, an urgent situation is one which, in the opinion of the provider, the health of the patient may be in serious jeopardy or may experience pain that cannot be adequately controlled while waiting for a decision on the appeal.;  Drug  Tirosint 200MCG capsules  Form  [facility] Electronic PA Form (2017 NCPDP)

## 2023-03-20 NOTE — TELEPHONE ENCOUNTER
Patient has been on levothyroxine, synthroid and other alternatives, it resulted in level variability. Tirosint was the only med that allowed resolution of the symptoms and thyroid level stability.   Could you please start an appeal? If official letter of med necessity needed, I can type it

## 2023-03-28 ENCOUNTER — PRIOR AUTHORIZATION (OUTPATIENT)
Dept: ENDOCRINOLOGY | Facility: CLINIC | Age: 53
End: 2023-03-28
Payer: COMMERCIAL

## 2023-03-29 NOTE — TELEPHONE ENCOUNTER
Other option for the [facility] allow 3 months supply for around 100$. If she is not ok with that, send levothyroxine and we will resubmit again if levels are abnormal.

## 2023-04-03 ENCOUNTER — HOSPITAL ENCOUNTER (OUTPATIENT)
Dept: MAMMOGRAPHY | Facility: HOSPITAL | Age: 53
Discharge: HOME | End: 2023-04-03
Admitting: RADIOLOGY
Payer: COMMERCIAL

## 2023-04-03 DIAGNOSIS — R92.8 ABNORMAL MAMMOGRAM: ICD-10-CM

## 2023-04-03 PROCEDURE — 77061 BREAST TOMOSYNTHESIS UNI: CPT | Performed by: RADIOLOGY

## 2023-04-03 PROCEDURE — 77065 DX MAMMO INCL CAD UNI: CPT | Performed by: RADIOLOGY

## 2023-04-03 PROCEDURE — 77065 DX MAMMO INCL CAD UNI: CPT

## 2023-04-03 PROCEDURE — G0279 TOMOSYNTHESIS, MAMMO: HCPCS

## 2023-04-05 ENCOUNTER — TELEPHONE (OUTPATIENT)
Dept: ENDOCRINOLOGY | Facility: CLINIC | Age: 53
End: 2023-04-05
Payer: COMMERCIAL

## 2023-04-05 RX ORDER — TIRZEPATIDE 10 MG/.5ML
10 INJECTION, SOLUTION SUBCUTANEOUS WEEKLY
Qty: 6 ML | Refills: 0 | Status: SHIPPED | OUTPATIENT
Start: 2023-04-05 | End: 2023-04-19 | Stop reason: SDUPTHER

## 2023-04-05 NOTE — TELEPHONE ENCOUNTER
Patient did not answer. Left message stating that the provider covering for her sent in the medication she was inquiring about to her pharmacy and if she had any issues or questions to give us a call.

## 2023-04-05 NOTE — TELEPHONE ENCOUNTER
Pt called wanting to know if she can switch from Ozempic to Mounjaro pt has been on Ozempic for a while and not losing any weight also hard to find at pharmacies. Pt last f/u 11/09/22 pt next f/u 05/09/23. Pt would li9ke to be notified.

## 2023-04-19 ENCOUNTER — TELEPHONE (OUTPATIENT)
Dept: ENDOCRINOLOGY | Facility: CLINIC | Age: 53
End: 2023-04-19
Payer: COMMERCIAL

## 2023-04-19 DIAGNOSIS — E11.65 UNCONTROLLED TYPE 2 DIABETES MELLITUS WITH HYPERGLYCEMIA: ICD-10-CM

## 2023-04-19 NOTE — TELEPHONE ENCOUNTER
Pt states that she needs to switch all medications to [facility] in [place].   Rx Refill Note  Requested Prescriptions     Pending Prescriptions Disp Refills   • cloNIDine (Catapres) 0.1 MG tablet 30 tablet 5     Sig: Take 1 tablet by mouth Every Night.   • levothyroxine (SYNTHROID, LEVOTHROID) 200 MCG tablet 90 tablet 1     Sig: Take 1 tablet by mouth Daily. In addition to 25mcg.   • levothyroxine (SYNTHROID, LEVOTHROID) 25 MCG tablet 90 tablet 1     Sig: Take 1 tablet by mouth Every Morning. In addition to 200mcg.   • pregabalin (LYRICA) 300 MG capsule 60 capsule 5     Si PO BID   • Tirzepatide (Mounjaro) 10 MG/0.5ML solution pen-injector 6 mL 0     Sig: Inject 0.5 mL under the skin into the appropriate area as directed 1 (One) Time Per Week. Change from ozempic      Last office visit with prescribing clinician: 2022   Last telemedicine visit with prescribing clinician: 2023   Next office visit with prescribing clinician: 2023                         Would you like a call back once the refill request has been completed: [x] Yes [] No    If the office needs to give you a call back, can they leave a voicemail: [x] Yes [] No    João Salas MA  23, 14:25 EDT

## 2023-04-19 NOTE — TELEPHONE ENCOUNTER
Patient called stated she needs all of her prescriptions sent to a new pharmacy, [facility] in [place]. Tried to ask what she needed and she just stated she needs all of them. Please advise.

## 2023-04-21 RX ORDER — CLONIDINE HYDROCHLORIDE 0.1 MG/1
0.1 TABLET ORAL NIGHTLY
Qty: 30 TABLET | Refills: 5 | Status: SHIPPED | OUTPATIENT
Start: 2023-04-21

## 2023-04-21 RX ORDER — LEVOTHYROXINE SODIUM 0.2 MG/1
200 TABLET ORAL DAILY
Qty: 90 TABLET | Refills: 1 | Status: SHIPPED | OUTPATIENT
Start: 2023-04-21

## 2023-04-21 RX ORDER — LEVOTHYROXINE SODIUM 0.03 MG/1
25 TABLET ORAL
Qty: 90 TABLET | Refills: 1 | Status: SHIPPED | OUTPATIENT
Start: 2023-04-21

## 2023-04-21 RX ORDER — TIRZEPATIDE 10 MG/.5ML
10 INJECTION, SOLUTION SUBCUTANEOUS WEEKLY
Qty: 6 ML | Refills: 0 | Status: SHIPPED | OUTPATIENT
Start: 2023-04-21

## 2023-04-21 RX ORDER — PREGABALIN 300 MG/1
CAPSULE ORAL
Qty: 60 CAPSULE | Refills: 5 | Status: SHIPPED | OUTPATIENT
Start: 2023-04-21

## 2023-05-09 ENCOUNTER — OFFICE VISIT (OUTPATIENT)
Dept: ENDOCRINOLOGY | Facility: CLINIC | Age: 53
End: 2023-05-09
Payer: COMMERCIAL

## 2023-05-09 VITALS
HEIGHT: 64 IN | BODY MASS INDEX: 33.97 KG/M2 | SYSTOLIC BLOOD PRESSURE: 124 MMHG | DIASTOLIC BLOOD PRESSURE: 74 MMHG | RESPIRATION RATE: 16 BRPM | OXYGEN SATURATION: 92 % | WEIGHT: 199 LBS | HEART RATE: 76 BPM

## 2023-05-09 DIAGNOSIS — E03.9 ACQUIRED HYPOTHYROIDISM: ICD-10-CM

## 2023-05-09 DIAGNOSIS — E11.65 UNCONTROLLED TYPE 2 DIABETES MELLITUS WITH HYPERGLYCEMIA: Primary | ICD-10-CM

## 2023-05-09 PROBLEM — K52.9 CHRONIC DIARRHEA OF UNKNOWN ORIGIN: Status: ACTIVE | Noted: 2023-05-09

## 2023-05-09 PROBLEM — B49 INFECTION DUE TO FUNGUS: Status: ACTIVE | Noted: 2023-05-09

## 2023-05-09 PROBLEM — M25.569 ARTHRALGIA OF KNEE: Status: ACTIVE | Noted: 2017-11-20

## 2023-05-09 PROBLEM — M79.671 PAIN IN BOTH FEET: Status: ACTIVE | Noted: 2022-10-14

## 2023-05-09 PROBLEM — M79.672 PAIN IN BOTH FEET: Status: ACTIVE | Noted: 2022-10-14

## 2023-05-09 PROBLEM — G62.9 NEUROPATHY: Status: ACTIVE | Noted: 2021-03-22

## 2023-05-09 PROBLEM — M54.50 LOWER BACK PAIN: Status: ACTIVE | Noted: 2021-07-27

## 2023-05-09 PROBLEM — E78.49 FAMILIAL COMBINED HYPERLIPIDEMIA: Status: ACTIVE | Noted: 2023-05-09

## 2023-05-09 PROBLEM — M89.8X9 BONE PAIN: Status: ACTIVE | Noted: 2018-12-06

## 2023-05-09 LAB
EXPIRATION DATE: NORMAL
EXPIRATION DATE: NORMAL
GLUCOSE BLDC GLUCOMTR-MCNC: 128 MG/DL (ref 70–130)
HBA1C MFR BLD: 6.1 %
Lab: NORMAL
Lab: NORMAL

## 2023-05-09 PROCEDURE — 84439 ASSAY OF FREE THYROXINE: CPT | Performed by: INTERNAL MEDICINE

## 2023-05-09 PROCEDURE — 80053 COMPREHEN METABOLIC PANEL: CPT | Performed by: INTERNAL MEDICINE

## 2023-05-09 PROCEDURE — 84443 ASSAY THYROID STIM HORMONE: CPT | Performed by: INTERNAL MEDICINE

## 2023-05-09 RX ORDER — TIRZEPATIDE 12.5 MG/.5ML
12.5 INJECTION, SOLUTION SUBCUTANEOUS WEEKLY
Qty: 2 ML | Refills: 5 | Status: SHIPPED | OUTPATIENT
Start: 2023-05-09

## 2023-05-09 NOTE — PROGRESS NOTES
Chief complaint  Injections (Pt has questions about different injection locations) and Diabetes (Pt has questions about getting Blaine or Dexcom)    Subjective   Ann Galdamez is a 52 y.o. female is here today for follow-up.  Follow-up for DM type 2 dx 2012. After gastric sleeve surgery she has lost 35 lbs and came off all her diabetes medications, including insulin, victoza, metformin.   Complications: sx of neuropathy that persisted after diabetes control improved  Takes Lyrica 300 mg BID.   Current meds: Moundjaro     Hypothyroidism dx 2006. Levothyroxine resulted in dose variability due to poor absorption and we have transitioned to Tirosint 200 mcg daily.     Vit D deficiency - on supplements.     She is doing well after switch to Mounjaro.Glucose improved, no hypoglycemia.        Medications    Current Outpatient Medications:   •  cloNIDine (Catapres) 0.1 MG tablet, Take 1 tablet by mouth Every Night., Disp: 30 tablet, Rfl: 5  •  CRANBERRY PO, Take 1 capsule by mouth daily., Disp: , Rfl:   •  cyanocobalamin 1000 MCG/ML injection, INJECT 1 ML UNDER THE SKIN INTO THE APPROPRIATE AREA AS DIRECTED EVERY 28 DAYS, Disp: 3 mL, Rfl: 3  •  diclofenac (VOLTAREN) 75 MG EC tablet, TAKE ONE TABLET BY MOUTH EVERY TWELVE HOURS --TAKE WITH FOOD--, Disp: , Rfl:   •  DULoxetine (CYMBALTA) 60 MG capsule, , Disp: , Rfl:   •  HYDROcodone-acetaminophen (NORCO) 5-325 MG per tablet, Take 1 tablet by mouth Every 6 (Six) Hours As Needed for Moderate Pain., Disp: , Rfl:   •  ibuprofen (ADVIL,MOTRIN) 600 MG tablet, Take 1 tablet by mouth Every 6 (Six) Hours As Needed for Mild Pain ., Disp: 30 tablet, Rfl: 1  •  levothyroxine (SYNTHROID, LEVOTHROID) 200 MCG tablet, Take 1 tablet by mouth Daily. In addition to 25mcg., Disp: 90 tablet, Rfl: 1  •  levothyroxine (SYNTHROID, LEVOTHROID) 25 MCG tablet, Take 1 tablet by mouth Every Morning. In addition to 200mcg., Disp: 90 tablet, Rfl: 1  •  losartan-hydrochlorothiazide (HYZAAR) 50-12.5  "MG per tablet, Take 1 tablet by mouth Daily., Disp: , Rfl:   •  omeprazole (priLOSEC) 20 MG capsule, TAKE 1 CAPSULE DAILY, Disp: 90 capsule, Rfl: 1  •  pravastatin (PRAVACHOL) 20 MG tablet, Daily., Disp: , Rfl:   •  pregabalin (LYRICA) 300 MG capsule, 1 PO BID, Disp: 60 capsule, Rfl: 5  •  Syringe 23G X 1\" 3 ML misc, 3 each Every 28 (Twenty-Eight) Days. Syringe for B12 vitamin administration, Disp: 3 each, Rfl: 3  •  Syringe/Needle, Disp, (B-D 3CC LUER-JORDY SYR 25GX1\") 25G X 1\" 3 ML misc, USE ONCE MONTHLY, Disp: 3 each, Rfl: 3  •  traZODone (DESYREL) 50 MG tablet, TAKE 1 TABLET AT NIGHT AS NEEDED FOR SLEEP, Disp: 90 tablet, Rfl: 1  •  vitamin D (ERGOCALCIFEROL) 1.25 MG (67810 UT) capsule capsule, Take 1 capsule by mouth Every 7 (Seven) Days., Disp: 12 capsule, Rfl: 1  •  Continuous Blood Gluc Sensor (FreeStyle Blaine 2 Sensor) misc, 1 each Every 14 (Fourteen) Days., Disp: 2 each, Rfl: 6  •  Tirzepatide (Mounjaro) 12.5 MG/0.5ML solution pen-injector, Inject 12.5 mg under the skin into the appropriate area as directed 1 (One) Time Per Week., Disp: 2 mL, Rfl: 5      Review of systems  Review of Systems   Constitutional: Positive for fatigue and unexpected weight change.       Physical exam  Objective   Blood pressure 124/74, pulse 76, resp. rate 16, height 162.6 cm (64\"), weight 90.3 kg (199 lb), SpO2 92 %. Body mass index is 34.16 kg/m².   Physical Exam   Constitutional: She is oriented to person, place, and time. She appears well-developed.   HENT:   Head: Normocephalic and atraumatic.   Eyes: Conjunctivae are normal.   Neck: No thyromegaly present.   The neck is supple and symmetric   Cardiovascular: Normal rate, regular rhythm, normal heart sounds and normal pulses.   Pulmonary/Chest: Effort normal and breath sounds normal.   Musculoskeletal: Normal range of motion.   Neurological: She is alert and oriented to person, place, and time. She has normal reflexes.   Psychiatric: Thought content normal.   Vitals " reviewed.      Results for orders placed or performed in visit on 05/09/23   TSH    Specimen: Arm, Left; Blood   Result Value Ref Range    TSH 4.690 (H) 0.270 - 4.200 uIU/mL   T4, Free    Specimen: Arm, Left; Blood   Result Value Ref Range    Free T4 1.93 (H) 0.93 - 1.70 ng/dL   Comprehensive Metabolic Panel    Specimen: Arm, Left; Blood   Result Value Ref Range    Glucose 104 (H) 65 - 99 mg/dL    BUN 37 (H) 6 - 20 mg/dL    Creatinine 1.65 (H) 0.57 - 1.00 mg/dL    Sodium 139 136 - 145 mmol/L    Potassium 4.6 3.5 - 5.2 mmol/L    Chloride 99 98 - 107 mmol/L    CO2 28.1 22.0 - 29.0 mmol/L    Calcium 10.2 8.6 - 10.5 mg/dL    Total Protein 7.6 6.0 - 8.5 g/dL    Albumin 4.6 3.5 - 5.2 g/dL    ALT (SGPT) 12 1 - 33 U/L    AST (SGOT) 15 1 - 32 U/L    Alkaline Phosphatase 62 39 - 117 U/L    Total Bilirubin 0.4 0.0 - 1.2 mg/dL    Globulin 3.0 gm/dL    A/G Ratio 1.5 g/dL    BUN/Creatinine Ratio 22.4 7.0 - 25.0    Anion Gap 11.9 5.0 - 15.0 mmol/L    eGFR 37.0 (L) >60.0 mL/min/1.73   POC Glycosylated Hemoglobin (Hb A1C)    Specimen: Blood   Result Value Ref Range    Hemoglobin A1C 6.1 %    Lot Number [id number]     Expiration Date 1-24-25    POC Glucose, Blood    Specimen: Blood   Result Value Ref Range    Glucose 128 70 - 130 mg/dL    Lot Number [id number]     Expiration Date 11-18-23      Lab Results   Component Value Date    HGBA1C 6.1 05/09/2023    HGBA1C 6.1 11/09/2022    HGBA1C 5.9 02/16/2022     Lab Results   Component Value Date    MICROALBUR 5.0 05/22/2018    CREATININE 1.65 (H) 05/09/2023       Assessment  Assessment & Plan   Problems Addressed this Visit        Endocrine and Metabolic    Hypothyroidism    Relevant Orders    TSH (Completed)    T4, Free (Completed)    Comprehensive Metabolic Panel (Completed)    Type 2 diabetes mellitus, uncontrolled - Primary    Relevant Medications    Tirzepatide (Mounjaro) 12.5 MG/0.5ML solution pen-injector    Other Relevant Orders    POC Glycosylated Hemoglobin (Hb A1C) (Completed)     POC Glucose, Blood (Completed)   Diagnoses       Codes Comments    Uncontrolled type 2 diabetes mellitus with hyperglycemia    -  Primary ICD-10-CM: E11.65  ICD-9-CM: 250.02     Acquired hypothyroidism     ICD-10-CM: E03.9  ICD-9-CM: 244.9           Plan  1.Tirosint 200 mcg was changed to levothyroxine 225 mcg since March. Insurance is not covering the Tirosint.    Repeat labs today for dose adjustment. She was doing great on Tirosint and after change back to generic the levels are abnormal again.   Thyroid levels showed high TSH and high free T4. I have changed her med to the brand name levoxyl 200 mcg daily and recheck the level sin 2 months.     2. Diabetes mellitus type 2 .   -Mounjaro increased to 12.5 mg   -Continue with diet. Discussed dietary modifications.     ABnormal renal function - will monitor   Follow-up in 6 months

## 2023-05-10 DIAGNOSIS — E11.65 UNCONTROLLED TYPE 2 DIABETES MELLITUS WITH HYPERGLYCEMIA: ICD-10-CM

## 2023-05-10 LAB
ALBUMIN SERPL-MCNC: 4.6 G/DL (ref 3.5–5.2)
ALBUMIN/GLOB SERPL: 1.5 G/DL
ALP SERPL-CCNC: 62 U/L (ref 39–117)
ALT SERPL W P-5'-P-CCNC: 12 U/L (ref 1–33)
ANION GAP SERPL CALCULATED.3IONS-SCNC: 11.9 MMOL/L (ref 5–15)
AST SERPL-CCNC: 15 U/L (ref 1–32)
BILIRUB SERPL-MCNC: 0.4 MG/DL (ref 0–1.2)
BUN SERPL-MCNC: 37 MG/DL (ref 6–20)
BUN/CREAT SERPL: 22.4 (ref 7–25)
CALCIUM SPEC-SCNC: 10.2 MG/DL (ref 8.6–10.5)
CHLORIDE SERPL-SCNC: 99 MMOL/L (ref 98–107)
CO2 SERPL-SCNC: 28.1 MMOL/L (ref 22–29)
CREAT SERPL-MCNC: 1.65 MG/DL (ref 0.57–1)
EGFRCR SERPLBLD CKD-EPI 2021: 37 ML/MIN/1.73
GLOBULIN UR ELPH-MCNC: 3 GM/DL
GLUCOSE SERPL-MCNC: 104 MG/DL (ref 65–99)
POTASSIUM SERPL-SCNC: 4.6 MMOL/L (ref 3.5–5.2)
PROT SERPL-MCNC: 7.6 G/DL (ref 6–8.5)
SODIUM SERPL-SCNC: 139 MMOL/L (ref 136–145)
T4 FREE SERPL-MCNC: 1.93 NG/DL (ref 0.93–1.7)
TSH SERPL DL<=0.05 MIU/L-ACNC: 4.69 UIU/ML (ref 0.27–4.2)

## 2023-05-10 RX ORDER — LEVOTHYROXINE SODIUM 200 UG/1
200 TABLET ORAL DAILY
Qty: 30 TABLET | Refills: 6 | Status: SHIPPED | OUTPATIENT
Start: 2023-05-10

## 2023-05-12 RX ORDER — PREGABALIN 300 MG/1
CAPSULE ORAL
Qty: 60 CAPSULE | Refills: 5 | Status: SHIPPED | OUTPATIENT
Start: 2023-05-12

## 2023-06-05 RX ORDER — CLONIDINE HYDROCHLORIDE 0.1 MG/1
0.1 TABLET ORAL NIGHTLY
Qty: 90 TABLET | Refills: 1 | Status: SHIPPED | OUTPATIENT
Start: 2023-06-05

## 2023-06-05 NOTE — TELEPHONE ENCOUNTER
Rx Refill Note  Requested Prescriptions      No prescriptions requested or ordered in this encounter      Last office visit with prescribing clinician: 5/9/2023     Next office visit with prescribing clinician: 10/18/2023

## 2023-07-24 RX ORDER — TRAZODONE HYDROCHLORIDE 50 MG/1
TABLET ORAL
Qty: 90 TABLET | Refills: 1 | Status: SHIPPED | OUTPATIENT
Start: 2023-07-24

## 2023-07-24 NOTE — TELEPHONE ENCOUNTER
Rx Refill Note  Requested Prescriptions     Pending Prescriptions Disp Refills    traZODone (DESYREL) 50 MG tablet [Pharmacy Med Name: TRAZODONE HCL TABS 50MG] 90 tablet 3     Sig: TAKE 1 TABLET AT NIGHT AS NEEDED FOR SLEEP      Last office visit with prescribing clinician: 5/9/2023   Last telemedicine visit with prescribing clinician: Visit date not found   Next office visit with prescribing clinician: 10/18/2023                         Would you like a call back once the refill request has been completed: [] Yes [] No    If the office needs to give you a call back, can they leave a voicemail: [] Yes [] No    China Miles CMA  07/24/23, 10:22 EDT

## 2023-08-14 ENCOUNTER — HOSPITAL ENCOUNTER (EMERGENCY)
Age: 53
Discharge: HOME | End: 2023-08-14
Payer: COMMERCIAL

## 2023-08-14 VITALS
OXYGEN SATURATION: 100 % | HEART RATE: 66 BPM | TEMPERATURE: 97.4 F | SYSTOLIC BLOOD PRESSURE: 139 MMHG | DIASTOLIC BLOOD PRESSURE: 58 MMHG | RESPIRATION RATE: 18 BRPM

## 2023-08-14 VITALS — BODY MASS INDEX: 30.9 KG/M2

## 2023-08-14 VITALS
OXYGEN SATURATION: 100 % | HEART RATE: 89 BPM | RESPIRATION RATE: 20 BRPM | DIASTOLIC BLOOD PRESSURE: 85 MMHG | SYSTOLIC BLOOD PRESSURE: 179 MMHG

## 2023-08-14 VITALS — SYSTOLIC BLOOD PRESSURE: 151 MMHG | OXYGEN SATURATION: 100 % | HEART RATE: 94 BPM | DIASTOLIC BLOOD PRESSURE: 72 MMHG

## 2023-08-14 VITALS
SYSTOLIC BLOOD PRESSURE: 166 MMHG | HEART RATE: 90 BPM | DIASTOLIC BLOOD PRESSURE: 83 MMHG | TEMPERATURE: 97.34 F | RESPIRATION RATE: 20 BRPM

## 2023-08-14 DIAGNOSIS — R10.13: Primary | ICD-10-CM

## 2023-08-14 DIAGNOSIS — Z87.891: ICD-10-CM

## 2023-08-14 DIAGNOSIS — K52.9: ICD-10-CM

## 2023-08-14 LAB
ALBUMIN LEVEL: 4.8 G/DL (ref 3.5–5)
ALBUMIN/GLOB SERPL: 1.3 {RATIO} (ref 1.1–1.8)
ALP ISO SERPL-ACNC: 83 U/L (ref 38–126)
ALT SERPLBLD-CCNC: 31 U/L (ref 12–78)
ANION GAP SERPL CALC-SCNC: 21.4 MEQ/L (ref 5–15)
AST SERPL QL: 31 U/L (ref 14–36)
BILIRUBIN,TOTAL: 1 MG/DL (ref 0.2–1.3)
BUN SERPL-MCNC: 24 MG/DL (ref 7–17)
CALCIUM SPEC-MCNC: 10.2 MG/DL (ref 8.4–10.2)
CELLS COUNTED: 100
CHLORIDE SPEC-SCNC: 99 MMOL/L (ref 98–107)
CO2 SERPL-SCNC: 23 MMOL/L (ref 22–30)
COLOR UR: YELLOW
CREAT BLD-SCNC: 0.9 MG/DL (ref 0.52–1.04)
CREATININE CLEARANCE ESTIMATED: 93 ML/MIN (ref 50–200)
ESTIMATED GLOMERULAR FILT RATE: 65 ML/MIN (ref 60–?)
GFR (AFRICAN AMERICAN): 79 ML/MIN (ref 60–?)
GLOBULIN SER CALC-MCNC: 3.6 G/DL (ref 1.3–3.2)
GLUCOSE UR QL STRIP.AUTO: (no result)
GLUCOSE: 200 MG/DL (ref 74–100)
HCT VFR BLD CALC: 46.9 % (ref 37–47)
HGB BLD-MCNC: 15.7 G/DL (ref 12.2–16.2)
KETONES UR STRIP.AUTO-MCNC: (no result) MG/DL
LIPASE: 91 U/L (ref 23–300)
LIPASE: 92 U/L (ref 23–300)
MANUAL DIFFERENTIAL: (no result)
MCHC RBC-ENTMCNC: 33.4 G/DL (ref 31.8–35.4)
MCV RBC: 86 FL (ref 81–99)
MEAN CORPUSCULAR HEMOGLOBIN: 28.7 PG (ref 27–31.2)
MICRO URNS: (no result)
PH UR: 7.5 [PH] (ref 5–8.5)
PLATELET # BLD: 298 K/MM3 (ref 142–424)
POTASSIUM: 3.4 MMOL/L (ref 3.5–5.1)
PROT SERPL-MCNC: 8.4 G/DL (ref 6.3–8.2)
RBC # BLD AUTO: 5.45 M/MM3 (ref 4.2–5.4)
REFLEX LACTIC: (no result)
SODIUM SPEC-SCNC: 140 MMOL/L (ref 136–145)
SP GR UR: 1.01 (ref 1–1.03)
SQUAMOUS URNS QL MICRO: (no result) #/HPF (ref 0–5)
UROBILINOGEN UR QL: 0.2 EU/DL
WBC # BLD AUTO: 15.9 K/MM3 (ref 4.8–10.8)

## 2023-08-14 PROCEDURE — 83605 ASSAY OF LACTIC ACID: CPT

## 2023-08-14 PROCEDURE — 96375 TX/PRO/DX INJ NEW DRUG ADDON: CPT

## 2023-08-14 PROCEDURE — 96376 TX/PRO/DX INJ SAME DRUG ADON: CPT

## 2023-08-14 PROCEDURE — 81001 URINALYSIS AUTO W/SCOPE: CPT

## 2023-08-14 PROCEDURE — 85025 COMPLETE CBC W/AUTO DIFF WBC: CPT

## 2023-08-14 PROCEDURE — 74177 CT ABD & PELVIS W/CONTRAST: CPT

## 2023-08-14 PROCEDURE — 87086 URINE CULTURE/COLONY COUNT: CPT

## 2023-08-14 PROCEDURE — 87636 SARSCOV2 & INF A&B AMP PRB: CPT

## 2023-08-14 PROCEDURE — 83690 ASSAY OF LIPASE: CPT

## 2023-08-14 PROCEDURE — 85007 BL SMEAR W/DIFF WBC COUNT: CPT

## 2023-08-14 PROCEDURE — 96374 THER/PROPH/DIAG INJ IV PUSH: CPT

## 2023-08-14 PROCEDURE — 99285 EMERGENCY DEPT VISIT HI MDM: CPT

## 2023-08-14 PROCEDURE — 96361 HYDRATE IV INFUSION ADD-ON: CPT

## 2023-08-14 PROCEDURE — 80053 COMPREHEN METABOLIC PANEL: CPT

## 2023-08-17 RX ORDER — TIRZEPATIDE 12.5 MG/.5ML
INJECTION, SOLUTION SUBCUTANEOUS
Qty: 6 ML | Refills: 0 | Status: SHIPPED | OUTPATIENT
Start: 2023-08-17

## 2023-08-17 NOTE — TELEPHONE ENCOUNTER
Rx Refill Note    Requested Prescriptions     Pending Prescriptions Disp Refills    Mounjaro 12.5 MG/0.5ML solution pen-injector [Pharmacy Med Name: MOUNJARO 12.5MG/0.5ML PF  PEN INJ] 2 mL      Sig: ADMINISTER 0.5 ML( 12.5MG) UNDER THE SKIN 1 TIME EVERY WEEK AS DIRECTED        Last office visit with prescribing clinician: 5/9/2023       Next office visit with prescribing clinician: 10/18/2023     {    Ashley Boo MA  08/17/23, 10:28 EDT

## 2023-09-27 ENCOUNTER — TELEPHONE (OUTPATIENT)
Dept: ENDOCRINOLOGY | Facility: CLINIC | Age: 53
End: 2023-09-27
Payer: COMMERCIAL

## 2023-09-27 NOTE — TELEPHONE ENCOUNTER
Caller: [employer]    Relationship to patient: Other    Best call back number: [phone]     Patient is needing:  SHIELA FROM [employer] CALLED TO FOLLOW UP ON A RECORDS REQUEST THEY FAXED TO US ON 7/7/23.  PLEASE CONTACT THEIR OFFICE AT [phone].  THANK YOU.

## 2023-09-27 NOTE — TELEPHONE ENCOUNTER
Returned Sonia's call. Unable to get agent to answer. Looks like request was sent to our medical records department. If someone calls back about this they will need to call our medical records department for status. Their # is [phone]. We do not process medical records in the office, iva zamora to read.

## 2023-10-18 ENCOUNTER — OFFICE VISIT (OUTPATIENT)
Dept: ENDOCRINOLOGY | Facility: CLINIC | Age: 53
End: 2023-10-18
Payer: COMMERCIAL

## 2023-10-18 VITALS
WEIGHT: 177 LBS | BODY MASS INDEX: 30.22 KG/M2 | DIASTOLIC BLOOD PRESSURE: 70 MMHG | HEIGHT: 64 IN | SYSTOLIC BLOOD PRESSURE: 120 MMHG | HEART RATE: 77 BPM

## 2023-10-18 DIAGNOSIS — E03.9 ACQUIRED HYPOTHYROIDISM: ICD-10-CM

## 2023-10-18 DIAGNOSIS — E11.65 UNCONTROLLED TYPE 2 DIABETES MELLITUS WITH HYPERGLYCEMIA: Primary | ICD-10-CM

## 2023-10-18 LAB
ANION GAP SERPL CALCULATED.3IONS-SCNC: 13.6 MMOL/L (ref 5–15)
BUN SERPL-MCNC: 25 MG/DL (ref 6–20)
BUN/CREAT SERPL: 17.4 (ref 7–25)
CALCIUM SPEC-SCNC: 9.4 MG/DL (ref 8.6–10.5)
CHLORIDE SERPL-SCNC: 101 MMOL/L (ref 98–107)
CO2 SERPL-SCNC: 24.4 MMOL/L (ref 22–29)
CREAT SERPL-MCNC: 1.44 MG/DL (ref 0.57–1)
EGFRCR SERPLBLD CKD-EPI 2021: 43.3 ML/MIN/1.73
EXPIRATION DATE: ABNORMAL
EXPIRATION DATE: NORMAL
GLUCOSE BLDC GLUCOMTR-MCNC: 85 MG/DL (ref 70–130)
GLUCOSE SERPL-MCNC: 86 MG/DL (ref 65–99)
HBA1C MFR BLD: 5.6 % (ref 4.5–5.7)
Lab: ABNORMAL
Lab: NORMAL
POTASSIUM SERPL-SCNC: 4.4 MMOL/L (ref 3.5–5.2)
SODIUM SERPL-SCNC: 139 MMOL/L (ref 136–145)

## 2023-10-18 PROCEDURE — 80048 BASIC METABOLIC PNL TOTAL CA: CPT | Performed by: INTERNAL MEDICINE

## 2023-10-18 PROCEDURE — 84439 ASSAY OF FREE THYROXINE: CPT | Performed by: INTERNAL MEDICINE

## 2023-10-18 PROCEDURE — 84443 ASSAY THYROID STIM HORMONE: CPT | Performed by: INTERNAL MEDICINE

## 2023-10-18 RX ORDER — BLOOD-GLUCOSE SENSOR
1 EACH MISCELLANEOUS
Qty: 2 EACH | Refills: 6 | Status: SHIPPED | OUTPATIENT
Start: 2023-10-18

## 2023-10-18 NOTE — PROGRESS NOTES
Chief complaint  Diabetes    Subjective   Ann Galdamez is a 53 y.o. female is here today for follow-up.  Follow-up for DM type 2 dx 2012. After gastric sleeve surgery she has lost 35 lbs and came off all her diabetes medications, including insulin, victoza, metformin.   Current meds: Moundjaro 12,5 mg weekly. Her weight is stable now and she feels great. Hypoglycemia resolved.      Hypothyroidism dx 2006. Levothyroxine resulted in dose variability due to poor absorption and we have transitioned to Tirosint 200 mcg daily. Later switched to Levoxyl 200 mcg due to coverage issues.     Vit D deficiency - on supplements.     She is doing well after switch to Mounjaro. A1C is 5.6     C/o stress incontinence and periodic leakage. She is using Blaine Sensor and asked for Blaine 3 to continue. Glucose is at goal and she forgot reader today      Medications    Current Outpatient Medications:     cloNIDine (Catapres) 0.1 MG tablet, Take 1 tablet by mouth Every Night., Disp: 90 tablet, Rfl: 1    CRANBERRY PO, Take 1 capsule by mouth daily., Disp: , Rfl:     cyanocobalamin 1000 MCG/ML injection, INJECT 1 ML UNDER THE SKIN INTO THE APPROPRIATE AREA AS DIRECTED EVERY 28 DAYS, Disp: 3 mL, Rfl: 3    diclofenac (VOLTAREN) 75 MG EC tablet, TAKE ONE TABLET BY MOUTH EVERY TWELVE HOURS --TAKE WITH FOOD--, Disp: , Rfl:     DULoxetine (CYMBALTA) 60 MG capsule, , Disp: , Rfl:     HYDROcodone-acetaminophen (NORCO) 5-325 MG per tablet, Take 1 tablet by mouth Every 6 (Six) Hours As Needed for Moderate Pain., Disp: , Rfl:     ibuprofen (ADVIL,MOTRIN) 600 MG tablet, Take 1 tablet by mouth Every 6 (Six) Hours As Needed for Mild Pain ., Disp: 30 tablet, Rfl: 1    Levoxyl 200 MCG tablet, Take 1 tablet by mouth Daily., Disp: 30 tablet, Rfl: 6    losartan-hydrochlorothiazide (HYZAAR) 50-12.5 MG per tablet, Take 1 tablet by mouth Daily., Disp: , Rfl:     Mounjaro 12.5 MG/0.5ML solution pen-injector, ADMINISTER 0.5 ML( 12.5MG) UNDER THE SKIN 1  "TIME EVERY WEEK AS DIRECTED, Disp: 6 mL, Rfl: 0    omeprazole (priLOSEC) 20 MG capsule, TAKE 1 CAPSULE DAILY, Disp: 90 capsule, Rfl: 1    ondansetron (Zofran) 4 MG tablet, Take 1 tablet by mouth Every 8 (Eight) Hours As Needed for Nausea or Vomiting., Disp: 10 tablet, Rfl: 0    pravastatin (PRAVACHOL) 20 MG tablet, Daily., Disp: , Rfl:     pregabalin (LYRICA) 300 MG capsule, TAKE 1 CAPSULE TWICE A DAY, Disp: 60 capsule, Rfl: 5    Syringe 23G X 1\" 3 ML misc, 3 each Every 28 (Twenty-Eight) Days. Syringe for B12 vitamin administration, Disp: 3 each, Rfl: 3    Syringe/Needle, Disp, (B-D 3CC LUER-JORDY SYR 25GX1\") 25G X 1\" 3 ML misc, USE ONCE MONTHLY, Disp: 3 each, Rfl: 3    traZODone (DESYREL) 50 MG tablet, TAKE 1 TABLET AT NIGHT AS NEEDED FOR SLEEP, Disp: 90 tablet, Rfl: 1    vitamin D (ERGOCALCIFEROL) 1.25 MG (80478 UT) capsule capsule, Take 1 capsule by mouth Every 7 (Seven) Days., Disp: 12 capsule, Rfl: 1    Continuous Blood Gluc Sensor (FreeStyle Blaine 3 Sensor) misc, Use 1 each Every 14 (Fourteen) Days., Disp: 2 each, Rfl: 6      Review of systems  Review of Systems   Genitourinary:  Positive for urgency.       Physical exam  Objective   Blood pressure 120/70, pulse 77, height 162.6 cm (64.02\"), weight 80.3 kg (177 lb). Body mass index is 30.37 kg/m².   Physical Exam   Constitutional: She is oriented to person, place, and time. She appears well-developed.   HENT:   Head: Normocephalic and atraumatic.   Eyes: Conjunctivae are normal.   Neck: No thyromegaly present.   The neck is supple and symmetric   Cardiovascular: Normal rate, regular rhythm, normal heart sounds and normal pulses.   Pulmonary/Chest: Effort normal and breath sounds normal.   Musculoskeletal: Normal range of motion.   Neurological: She is alert and oriented to person, place, and time. She has normal reflexes.   Psychiatric: Thought content normal.   Vitals reviewed.      Results for orders placed or performed in visit on 07/13/23   Urine Culture - " Urine, Urine, Clean Catch    Specimen: Urine, Clean Catch   Result Value Ref Range    Urine Culture >100,000 CFU/mL Escherichia coli (A)        Susceptibility    Escherichia coli - JUAN     Ampicillin  Resistant ug/ml     Ampicillin + Sulbactam  Intermediate ug/ml     Cefazolin  Susceptible ug/ml     Cefepime  Susceptible ug/ml     Ceftazidime  Susceptible ug/ml     Ceftriaxone  Susceptible ug/ml     Gentamicin  Susceptible ug/ml     Levofloxacin  Intermediate ug/ml     Nitrofurantoin  Susceptible ug/ml     Piperacillin + Tazobactam  Susceptible ug/ml     Trimethoprim + Sulfamethoxazole  Susceptible ug/ml   Urinalysis With Culture If Indicated - Urine, Clean Catch    Specimen: Urine, Clean Catch   Result Value Ref Range    Color, UA Yellow Yellow, Straw    Appearance, UA Clear Clear    pH, UA 5.5 5.0 - 8.0    Specific Gravity, UA 1.018 1.001 - 1.030    Glucose, UA Negative Negative    Ketones, UA 40 mg/dL (2+) (A) Negative    Bilirubin, UA Negative Negative    Blood, UA Negative Negative    Protein, UA Trace (A) Negative    Leuk Esterase, UA Moderate (2+) (A) Negative    Nitrite, UA Negative Negative    Urobilinogen, UA 0.2 E.U./dL 0.2 - 1.0 E.U./dL   Urinalysis, Microscopic Only - Urine, Clean Catch    Specimen: Urine, Clean Catch   Result Value Ref Range    RBC, UA 0-2 None Seen, 0-2 /HPF    WBC, UA Too Numerous to Count (A) None Seen, 0-2 /HPF    Bacteria, UA 4+ (A) None Seen, Trace /HPF    Squamous Epithelial Cells, UA 0-2 None Seen, 0-2 /HPF    Hyaline Casts, UA 0-6 0 - 6 /LPF    Methodology Automated Microscopy      Lab Results   Component Value Date    HGBA1C 6.1 05/09/2023    HGBA1C 6.1 11/09/2022    HGBA1C 5.9 02/16/2022     Lab Results   Component Value Date    MICROALBUR 5.0 05/22/2018    CREATININE 1.65 (H) 05/09/2023       Assessment  Assessment & Plan   Problems Addressed this Visit          Other    Hypothyroidism    Type 2 diabetes mellitus, uncontrolled - Primary    Relevant Orders    POC Glucose,  Blood    POC Glycosylated Hemoglobin (Hb A1C)     Diagnoses         Codes Comments    Uncontrolled type 2 diabetes mellitus with hyperglycemia    -  Primary ICD-10-CM: E11.65  ICD-9-CM: 250.02     Acquired hypothyroidism     ICD-10-CM: E03.9  ICD-9-CM: 244.9             Plan  1.Levothyroxine 225 mcg since May 2023. She has switched to Levoxyl brand but didn't receive the message about reducing the dose to 200.   Tirosint was used before and levels were more consistent but insurance didn't cover it. We try to switch to a more consistent brand.   -repeat TFT and adjust the dose.     2. Diabetes mellitus type 2 . A1c is 5.6   -Mounjaro increased 12.5 mg   -Continue with diet. Discussed dietary modifications.   She uses sensor Blaine and I have sent a new prescription.       Follow-up in 6 months

## 2023-10-19 LAB
T4 FREE SERPL-MCNC: 2.12 NG/DL (ref 0.93–1.7)
TSH SERPL DL<=0.05 MIU/L-ACNC: 0.01 UIU/ML (ref 0.27–4.2)

## 2023-10-20 RX ORDER — LEVOTHYROXINE SODIUM 200 UG/1
200 TABLET ORAL DAILY
Qty: 30 TABLET | Refills: 6 | Status: SHIPPED | OUTPATIENT
Start: 2023-10-20

## 2023-10-20 NOTE — TELEPHONE ENCOUNTER
Refill per dr Toribio Refill Note  Requested Prescriptions     Pending Prescriptions Disp Refills    Levoxyl 200 MCG tablet 30 tablet 6     Sig: Take 1 tablet by mouth Daily.        Last office visit with prescribing clinician: 10/18/2023      Next office visit with prescribing clinician: 4/24/2024       Brooke Lester (Jodi)  10/20/23, 12:01 EDT

## 2023-11-03 ENCOUNTER — HOSPITAL ENCOUNTER (EMERGENCY)
Age: 53
Discharge: HOME | End: 2023-11-03
Payer: COMMERCIAL

## 2023-11-03 VITALS
OXYGEN SATURATION: 95 % | RESPIRATION RATE: 18 BRPM | SYSTOLIC BLOOD PRESSURE: 113 MMHG | DIASTOLIC BLOOD PRESSURE: 50 MMHG | HEART RATE: 70 BPM

## 2023-11-03 VITALS
TEMPERATURE: 98.78 F | HEART RATE: 89 BPM | SYSTOLIC BLOOD PRESSURE: 141 MMHG | DIASTOLIC BLOOD PRESSURE: 90 MMHG | RESPIRATION RATE: 16 BRPM | OXYGEN SATURATION: 98 %

## 2023-11-03 VITALS
TEMPERATURE: 98.06 F | RESPIRATION RATE: 16 BRPM | DIASTOLIC BLOOD PRESSURE: 50 MMHG | OXYGEN SATURATION: 97 % | SYSTOLIC BLOOD PRESSURE: 113 MMHG | HEART RATE: 73 BPM

## 2023-11-03 VITALS
OXYGEN SATURATION: 95 % | SYSTOLIC BLOOD PRESSURE: 136 MMHG | DIASTOLIC BLOOD PRESSURE: 74 MMHG | HEART RATE: 80 BPM | RESPIRATION RATE: 17 BRPM

## 2023-11-03 VITALS
DIASTOLIC BLOOD PRESSURE: 96 MMHG | RESPIRATION RATE: 17 BRPM | OXYGEN SATURATION: 96 % | SYSTOLIC BLOOD PRESSURE: 186 MMHG | HEART RATE: 70 BPM

## 2023-11-03 VITALS
SYSTOLIC BLOOD PRESSURE: 157 MMHG | OXYGEN SATURATION: 99 % | RESPIRATION RATE: 16 BRPM | HEART RATE: 89 BPM | DIASTOLIC BLOOD PRESSURE: 86 MMHG

## 2023-11-03 VITALS — BODY MASS INDEX: 29.2 KG/M2

## 2023-11-03 VITALS
OXYGEN SATURATION: 96 % | HEART RATE: 78 BPM | DIASTOLIC BLOOD PRESSURE: 84 MMHG | RESPIRATION RATE: 18 BRPM | SYSTOLIC BLOOD PRESSURE: 144 MMHG

## 2023-11-03 DIAGNOSIS — R19.7: ICD-10-CM

## 2023-11-03 DIAGNOSIS — E87.6: ICD-10-CM

## 2023-11-03 DIAGNOSIS — R10.13: Primary | ICD-10-CM

## 2023-11-03 DIAGNOSIS — R11.2: ICD-10-CM

## 2023-11-03 LAB
ALBUMIN LEVEL: 5.1 G/DL (ref 3.5–5)
ALBUMIN/GLOB SERPL: 1.4 {RATIO} (ref 1.1–1.8)
ALP ISO SERPL-ACNC: 62 U/L (ref 38–126)
ALT SERPLBLD-CCNC: 22 U/L (ref 12–78)
ANION GAP SERPL CALC-SCNC: 17.2 MEQ/L (ref 5–15)
AST SERPL QL: 25 U/L (ref 14–36)
BILIRUB UR STRIP-MCNC: (no result) MG/DL
BILIRUBIN,TOTAL: 1 MG/DL (ref 0.2–1.3)
BUN SERPL-MCNC: 26 MG/DL (ref 7–17)
CALCIUM SPEC-MCNC: 10 MG/DL (ref 8.4–10.2)
CELLS COUNTED: 100
CHLORIDE SPEC-SCNC: 101 MMOL/L (ref 98–107)
CO2 SERPL-SCNC: 25 MMOL/L (ref 22–30)
COLOR UR: YELLOW
CREAT BLD-SCNC: 1.2 MG/DL (ref 0.52–1.04)
CREATININE CLEARANCE ESTIMATED: 65 ML/MIN (ref 50–200)
ESTIMATED GLOMERULAR FILT RATE: 47 ML/MIN (ref 60–?)
GFR (AFRICAN AMERICAN): 57 ML/MIN (ref 60–?)
GLOBULIN SER CALC-MCNC: 3.6 G/DL (ref 1.3–3.2)
GLUCOSE: 116 MG/DL (ref 74–100)
HCT VFR BLD CALC: 46 % (ref 37–47)
HGB BLD-MCNC: 16.1 G/DL (ref 12.2–16.2)
KETONES UR STRIP.AUTO-MCNC: (no result) MG/DL
LIPASE: 63 U/L (ref 23–300)
MANUAL DIFFERENTIAL: (no result)
MCHC RBC-ENTMCNC: 34.9 G/DL (ref 31.8–35.4)
MCV RBC: 86.3 FL (ref 81–99)
MEAN CORPUSCULAR HEMOGLOBIN: 30.1 PG (ref 27–31.2)
MICRO URNS: (no result)
PH UR: 5.5 [PH] (ref 5–8.5)
PLATELET # BLD: 240 K/MM3 (ref 142–424)
POTASSIUM: 3.2 MMOL/L (ref 3.5–5.1)
PROT SERPL-MCNC: 8.7 G/DL (ref 6.3–8.2)
RBC # BLD AUTO: 5.33 M/MM3 (ref 4.2–5.4)
SODIUM SPEC-SCNC: 140 MMOL/L (ref 136–145)
SP GR UR: 1.01 (ref 1–1.03)
TROPONIN I: 0.02 NG/ML (ref 0–0.03)
TROPONIN I: 0.02 NG/ML (ref 0–0.03)
UROBILINOGEN UR QL: 0.2 EU/DL
WBC # BLD AUTO: 16.5 K/MM3 (ref 4.8–10.8)

## 2023-11-03 PROCEDURE — 80053 COMPREHEN METABOLIC PANEL: CPT

## 2023-11-03 PROCEDURE — 83690 ASSAY OF LIPASE: CPT

## 2023-11-03 PROCEDURE — 96374 THER/PROPH/DIAG INJ IV PUSH: CPT

## 2023-11-03 PROCEDURE — 85025 COMPLETE CBC W/AUTO DIFF WBC: CPT

## 2023-11-03 PROCEDURE — 83605 ASSAY OF LACTIC ACID: CPT

## 2023-11-03 PROCEDURE — 36415 COLL VENOUS BLD VENIPUNCTURE: CPT

## 2023-11-03 PROCEDURE — 85007 BL SMEAR W/DIFF WBC COUNT: CPT

## 2023-11-03 PROCEDURE — 84484 ASSAY OF TROPONIN QUANT: CPT

## 2023-11-03 PROCEDURE — 96375 TX/PRO/DX INJ NEW DRUG ADDON: CPT

## 2023-11-03 PROCEDURE — 74177 CT ABD & PELVIS W/CONTRAST: CPT

## 2023-11-03 PROCEDURE — 99285 EMERGENCY DEPT VISIT HI MDM: CPT

## 2023-11-03 PROCEDURE — 93005 ELECTROCARDIOGRAM TRACING: CPT

## 2023-11-03 PROCEDURE — 81001 URINALYSIS AUTO W/SCOPE: CPT

## 2023-11-27 NOTE — TELEPHONE ENCOUNTER
Rx Refill Note  Requested Prescriptions     Pending Prescriptions Disp Refills    cyanocobalamin 1000 MCG/ML injection [Pharmacy Med Name: CYANOCOBALAMIN VIAL/INJ 1ML1S 1MG/ML] 3 mL 3     Sig: INJECT 1 ML UNDER THE SKIN INTO THE APPROPRIATE AREA AS DIRECTED EVERY 28 DAYS      Last office visit with prescribing clinician: 10/18/2023   Last telemedicine visit with prescribing clinician: Visit date not found   Next office visit with prescribing clinician: 4/24/2024                         Would you like a call back once the refill request has been completed: [] Yes [] No    If the office needs to give you a call back, can they leave a voicemail: [] Yes [] No    Halie Maldonado MA  11/27/23, 08:03 EST

## 2023-11-28 RX ORDER — CYANOCOBALAMIN 1000 UG/ML
INJECTION, SOLUTION INTRAMUSCULAR; SUBCUTANEOUS
Qty: 3 ML | Refills: 3 | Status: SHIPPED | OUTPATIENT
Start: 2023-11-28

## 2023-12-21 DIAGNOSIS — E11.65 UNCONTROLLED TYPE 2 DIABETES MELLITUS WITH HYPERGLYCEMIA: ICD-10-CM

## 2023-12-21 NOTE — TELEPHONE ENCOUNTER
Rx Refill Note  Requested Prescriptions     Pending Prescriptions Disp Refills    pregabalin (LYRICA) 300 MG capsule [Pharmacy Med Name: PREGABALIN 300MG CAPSULES] 60 capsule      Sig: TAKE 1 CAPSULE BY MOUTH TWICE DAILY      Last office visit with prescribing clinician: 10/18/2023   Last telemedicine visit with prescribing clinician: Visit date not found   Next office visit with prescribing clinician: 4/24/2024                         Would you like a call back once the refill request has been completed: [] Yes [] No    If the office needs to give you a call back, can they leave a voicemail: [] Yes [] No    Halie Maldonado MA  12/21/23, 15:53 EST

## 2023-12-26 RX ORDER — PREGABALIN 300 MG/1
CAPSULE ORAL
Qty: 60 CAPSULE | Refills: 5 | Status: SHIPPED | OUTPATIENT
Start: 2023-12-26

## 2024-01-11 ENCOUNTER — OFFICE VISIT (OUTPATIENT)
Dept: GYNECOLOGIC ONCOLOGY | Facility: CLINIC | Age: 54
End: 2024-01-11
Payer: COMMERCIAL

## 2024-01-11 VITALS
HEIGHT: 64 IN | TEMPERATURE: 97.3 F | DIASTOLIC BLOOD PRESSURE: 77 MMHG | SYSTOLIC BLOOD PRESSURE: 166 MMHG | HEART RATE: 110 BPM | WEIGHT: 165.8 LBS | RESPIRATION RATE: 18 BRPM | BODY MASS INDEX: 28.31 KG/M2 | OXYGEN SATURATION: 95 %

## 2024-01-11 DIAGNOSIS — N39.46 MIXED STRESS AND URGE URINARY INCONTINENCE: ICD-10-CM

## 2024-01-11 DIAGNOSIS — Z85.42 HISTORY OF ENDOMETRIAL CANCER: Primary | ICD-10-CM

## 2024-01-11 RX ORDER — OXYBUTYNIN CHLORIDE 5 MG/1
5 TABLET ORAL 2 TIMES DAILY
Qty: 60 TABLET | Refills: 2 | Status: SHIPPED | OUTPATIENT
Start: 2024-01-11 | End: 2024-01-11 | Stop reason: SDUPTHER

## 2024-01-11 RX ORDER — OXYBUTYNIN CHLORIDE 5 MG/1
5 TABLET ORAL 2 TIMES DAILY
Qty: 60 TABLET | Refills: 2 | Status: SHIPPED | OUTPATIENT
Start: 2024-01-11

## 2024-01-11 NOTE — PROGRESS NOTES
GYN ONCOLOGY CANCER SURVEILLANCE FOLLOW-UP    Ann Galdamez  [id number]  1970    Subjective   Chief Complaint: Uterine Cancer (C/o bladder leaking)        History of present illness:     Ann Galdamez is a 53 y.o. year old female who is here today for ongoing surveillance of Endometrial Cancer, see Cancer History. She is now over 2 years out from completion of treatment with surgery alone. She is accompanied by her . Upon arrival today patient c/o bladder leaking. This has been a chronic problem, but she noticed it worsening after her hysterectomy. She reports leaking with coughing/sneezing/bending over and leaking on her way to the bathroom without control. She reports urgency and frequency. She is interested in pursuing medical and/or surgical intervention as this is becoming very bothersome for her. Otherwise, she is feeling well. She denies vaginal bleeding or discharge, pelvic pain, concerning lesions, or changes in bowel function. Denies dysuria, hematuria, fevers, or flank pain.            Cancer History:   Oncology/Hematology History   Endometrial cancer   12/23/2021 Initial Diagnosis    Referral [facility]    12/14/2021: TVUS with uterus measuring 6.0 x 3.7 x 3.7 cm with an endometrial stripe of 9.8 mm.  Cervix with multiple echogenic foci consistent with possible nabothian cyst.  Right and left ovaries normal.  Small amount of free fluid.  12/16/2021: EMB with FIGO grade 2 endometrioid adenocarcinoma     12/28/2021 Surgery    Robotic assisted total laparoscopic hysterectomy with bilateral salpingo-oophorectomy and bilateral pelvic sentinel lymph node dissection    Pathology with focal residual noninvasive adenocarcinoma.  0% myometrial invasion.  Negative cervix and adnexa.  Negative sentinel lymph nodes.    Surgery at [facility] by Liane Liz MD        Cancer Staged    Cancer Staging  Endometrial cancer (HCC)  Staging form: Corpus Uteri - Carcinoma And  "Carcinosarcoma, AJCC 8th Edition  - Pathologic stage from 12/28/2021: FIGO Stage IA (pT1a, pN0(sn), cM0) - Signed by Liane Liz MD on 7/13/2022 12/28/2021 Cancer Staged    Staging form: Corpus Uteri - Carcinoma And Carcinosarcoma, AJCC 8th Edition  - Pathologic stage from 12/28/2021: FIGO Stage IA (pT1a, pN0(sn), cM0) - Signed by Liane Liz MD on 7/13/2022           The current medication list and allergy list were reviewed and reconciled.     Past Medical History, Past Surgical History, Social History, Family History have been reviewed and are without significant changes except as mentioned.      Review of Systems   Constitutional: Negative.    Gastrointestinal: Negative.    Genitourinary:  Positive for frequency, urgency and urinary incontinence.   Musculoskeletal:  Positive for arthralgias.   Neurological:  Positive for numbness (neuropathy in feet, chronic, DM-related).   Psychiatric/Behavioral: Negative.             Objective   Physical Exam  Vital Signs: /77   Pulse 110   Temp 97.3 °F (36.3 °C) (Temporal)   Resp 18   Ht 162.6 cm (64.02\")   Wt 75.2 kg (165 lb 12.8 oz)   SpO2 95%   BMI 28.44 kg/m²   Vitals:    01/11/24 1129   PainSc:   9   PainLoc: Foot           General Appearance:  alert, cooperative, no apparent distress and appears stated age   Neurologic/Psych: A&O x 3, gait steady, appropriate affect   HEENT:  Normocephalic, without obvious abnormality, mucous membranes moist   Abdomen:   Soft, non-tender, non-distended, and no organomegaly   Lymph nodes: No cervical, supraclavicular, inguinal adenopathy noted   Pelvic: External Genitalia  without lesions or skin changes  Vagina  is pink, moist, without lesions.   Vaginal Cuff  Female Vaginal Cuff: smooth, intact, and without visible lesions  Uterus  surgically absent and no palpable masses  Ovaries  surgically absent bilaterally  Parametria  smooth  Rectovaginal  Female rectovaginal: deferred     ECOG score: 1       "       Procedure Note:            Assessment and Plan:    Diagnoses and all orders for this visit:    1. History of endometrial cancer (Primary)  -     Ambulatory Referral to Gynecologic Oncology    2. Mixed stress and urge urinary incontinence  -     Ambulatory Referral to Gynecologic Urology  -     oxybutynin (DITROPAN) 5 MG tablet; Take 1 tablet by mouth 2 (Two) Times a Day.  Dispense: 60 tablet; Refill: 2        There is no evidence of disease upon today's exam. Patient and I discussed that Dr. Liz has moved to [place] and joined GynOncology at [place]. Patient is now 2 years out from surgery alone and doing very well. She may go to yearly visits. She prefers to stay under the care of Dr. Liz and does not mind to make the drive to [place] once a year or as needed. Referral placed today to Dr. Liz at [place]. She is understanding that this office will remain available for any problems or complications in the future.     Patient and I also discussed her bladder leaking. Symptoms are indicative of mixed UI, discussed underlying causes of both and treatment options for both. For urge incontinence, I recommend a trial of oxybutynin and patient agrees. She is s/p gastric sleeve so will prescribe immediate release form. We reviewed medication instructions, risks, benefits, and potential side effects. New Rx to pharmacy as above. Patient is also interested in surgical consult for stress UI. Recommended UroGynecology at . Referral ordered. She v/u and agrees with plan. Patient appreciative of the help.       Pain assessment was performed today as a part of patient’s care.  For patients with pain related to surgery, gynecologic malignancy or cancer treatment, the plan is as noted in the assessment/plan.  For patients with pain not related to these issues, they are to seek any further needed care from a more appropriate provider, such as PCP.        Follow-up:     Return to clinic PRN.       Electronically  signed by Mimi Renae, APRN on 01/11/2024

## 2024-01-15 NOTE — TELEPHONE ENCOUNTER
Rx Refill Note  Requested Prescriptions     Pending Prescriptions Disp Refills    omeprazole (priLOSEC) 20 MG capsule [Pharmacy Med Name: OMEPRAZOLE DR CAPS 20MG] 90 capsule 3     Sig: TAKE 1 CAPSULE DAILY      Last office visit with prescribing clinician: 10/18/2023   Last telemedicine visit with prescribing clinician: Visit date not found   Next office visit with prescribing clinician: 4/24/2024                         Would you like a call back once the refill request has been completed: [] Yes [] No    If the office needs to give you a call back, can they leave a voicemail: [] Yes [] No    Halie Maldonado MA  01/15/24, 08:23 EST

## 2024-01-16 RX ORDER — OMEPRAZOLE 20 MG/1
CAPSULE, DELAYED RELEASE ORAL
Qty: 90 CAPSULE | Refills: 3 | Status: SHIPPED | OUTPATIENT
Start: 2024-01-16

## 2024-01-19 ENCOUNTER — HOSPITAL ENCOUNTER (EMERGENCY)
Age: 54
Discharge: HOME | End: 2024-01-19
Payer: COMMERCIAL

## 2024-01-19 VITALS
OXYGEN SATURATION: 100 % | DIASTOLIC BLOOD PRESSURE: 71 MMHG | SYSTOLIC BLOOD PRESSURE: 182 MMHG | HEART RATE: 56 BPM | RESPIRATION RATE: 18 BRPM | TEMPERATURE: 97.6 F

## 2024-01-19 VITALS
OXYGEN SATURATION: 97 % | DIASTOLIC BLOOD PRESSURE: 78 MMHG | RESPIRATION RATE: 18 BRPM | SYSTOLIC BLOOD PRESSURE: 118 MMHG | TEMPERATURE: 98 F | HEART RATE: 70 BPM

## 2024-01-19 VITALS — DIASTOLIC BLOOD PRESSURE: 92 MMHG | OXYGEN SATURATION: 100 % | HEART RATE: 61 BPM | SYSTOLIC BLOOD PRESSURE: 179 MMHG

## 2024-01-19 VITALS — BODY MASS INDEX: 28.3 KG/M2

## 2024-01-19 VITALS — OXYGEN SATURATION: 100 % | HEART RATE: 53 BPM | DIASTOLIC BLOOD PRESSURE: 75 MMHG | SYSTOLIC BLOOD PRESSURE: 193 MMHG

## 2024-01-19 DIAGNOSIS — R19.7: ICD-10-CM

## 2024-01-19 DIAGNOSIS — R11.2: ICD-10-CM

## 2024-01-19 DIAGNOSIS — R10.9: Primary | ICD-10-CM

## 2024-01-19 LAB
ALBUMIN LEVEL: 4.5 G/DL (ref 3.5–5)
ALBUMIN/GLOB SERPL: 1.6 {RATIO} (ref 1.1–1.8)
ALP ISO SERPL-ACNC: 67 U/L (ref 38–126)
ALT SERPLBLD-CCNC: 20 U/L (ref 12–78)
ANION GAP SERPL CALC-SCNC: 13.8 MEQ/L (ref 5–15)
AST SERPL QL: 29 U/L (ref 14–36)
BILIRUBIN,TOTAL: 1 MG/DL (ref 0.2–1.3)
BUN SERPL-MCNC: 13 MG/DL (ref 7–17)
CALCIUM SPEC-MCNC: 9.8 MG/DL (ref 8.4–10.2)
CHLORIDE SPEC-SCNC: 103 MMOL/L (ref 98–107)
CO2 SERPL-SCNC: 25 MMOL/L (ref 22–30)
CREATININE CLEARANCE ESTIMATED: 95 ML/MIN (ref 50–200)
CREATININE,SERUM: 0.8 MG/DL (ref 0.52–1.04)
ESTIMATED GLOMERULAR FILT RATE: 75 ML/MIN (ref 60–?)
GFR (AFRICAN AMERICAN): 90 ML/MIN (ref 60–?)
GLOBULIN SER CALC-MCNC: 2.9 G/DL (ref 1.3–3.2)
GLUCOSE: 174 MG/DL (ref 74–100)
HCT VFR BLD CALC: 43.3 % (ref 37–47)
HGB BLD-MCNC: 15.7 G/DL (ref 12.2–16.2)
LIPASE: 66 U/L (ref 23–300)
MCHC RBC-ENTMCNC: 36.1 G/DL (ref 31.8–35.4)
MCV RBC: 87.4 FL (ref 81–99)
MEAN CORPUSCULAR HEMOGLOBIN: 31.6 PG (ref 27–31.2)
PLATELET # BLD: 236 K/MM3 (ref 142–424)
POTASSIUM: 3.8 MMOL/L (ref 3.5–5.1)
PROT SERPL-MCNC: 7.4 G/DL (ref 6.3–8.2)
RBC # BLD AUTO: 4.96 M/MM3 (ref 4.2–5.4)
SODIUM SPEC-SCNC: 138 MMOL/L (ref 136–145)
TROPONIN I: < 0.01 NG/ML (ref 0–0.03)
WBC # BLD AUTO: 12.8 K/MM3 (ref 4.8–10.8)

## 2024-01-19 PROCEDURE — 74174 CTA ABD&PLVS W/CONTRAST: CPT

## 2024-01-19 PROCEDURE — 83690 ASSAY OF LIPASE: CPT

## 2024-01-19 PROCEDURE — 80053 COMPREHEN METABOLIC PANEL: CPT

## 2024-01-19 PROCEDURE — 99285 EMERGENCY DEPT VISIT HI MDM: CPT

## 2024-01-19 PROCEDURE — 96374 THER/PROPH/DIAG INJ IV PUSH: CPT

## 2024-01-19 PROCEDURE — 85025 COMPLETE CBC W/AUTO DIFF WBC: CPT

## 2024-01-19 PROCEDURE — 87636 SARSCOV2 & INF A&B AMP PRB: CPT

## 2024-01-19 PROCEDURE — 96375 TX/PRO/DX INJ NEW DRUG ADDON: CPT

## 2024-01-19 PROCEDURE — 84484 ASSAY OF TROPONIN QUANT: CPT

## 2024-01-19 PROCEDURE — 96361 HYDRATE IV INFUSION ADD-ON: CPT

## 2024-01-19 NOTE — TELEPHONE ENCOUNTER
Rx Refill Note    Requested Prescriptions     Pending Prescriptions Disp Refills    traZODone (DESYREL) 50 MG tablet [Pharmacy Med Name: TRAZODONE HCL TABS 50MG] 90 tablet 3     Sig: TAKE 1 TABLET AT NIGHT AS NEEDED FOR SLEEP        Last office visit with prescribing clinician: 10/18/2023       Next office visit with prescribing clinician: 4/24/2024     {    Ashley Boo MA  01/19/24, 14:28 EST

## 2024-01-21 RX ORDER — TRAZODONE HYDROCHLORIDE 50 MG/1
TABLET ORAL
Qty: 90 TABLET | Refills: 1 | Status: SHIPPED | OUTPATIENT
Start: 2024-01-21

## 2024-01-22 NOTE — TELEPHONE ENCOUNTER
Rx Refill Note  Requested Prescriptions     Pending Prescriptions Disp Refills    Levoxyl 200 MCG tablet 30 tablet 6     Sig: Take 1 tablet by mouth Daily.      Last office visit with prescribing clinician: 10/18/2023     Next office visit with prescribing clinician: 4/24/2024

## 2024-01-23 RX ORDER — LEVOTHYROXINE SODIUM 200 UG/1
200 TABLET ORAL DAILY
Qty: 90 TABLET | Refills: 1 | Status: SHIPPED | OUTPATIENT
Start: 2024-01-23

## 2024-02-05 ENCOUNTER — PRIOR AUTHORIZATION (OUTPATIENT)
Dept: ENDOCRINOLOGY | Facility: CLINIC | Age: 54
End: 2024-02-05
Payer: COMMERCIAL

## 2024-02-05 NOTE — TELEPHONE ENCOUNTER
Approvedtoday  Your PA request has been approved. Additional information will be provided in the approval communication. (Message 1145)  Drug  Mounjaro 12.5MG/0.5ML pen-injectors  Form  [facility] Electronic PA Form (2017 NCPDP)

## 2024-03-01 RX ORDER — CLONIDINE HYDROCHLORIDE 0.1 MG/1
0.1 TABLET ORAL NIGHTLY
Qty: 90 TABLET | Refills: 3 | Status: SHIPPED | OUTPATIENT
Start: 2024-03-01

## 2024-03-01 NOTE — TELEPHONE ENCOUNTER
Rx Refill Note  Requested Prescriptions     Pending Prescriptions Disp Refills    cloNIDine (CATAPRES) 0.1 MG tablet [Pharmacy Med Name: CLONIDINE HCL TABS 0.1MG] 90 tablet 3     Sig: TAKE 1 TABLET EVERY NIGHT      Last office visit with prescribing clinician: 10/18/2023   Last telemedicine visit with prescribing clinician: Visit date not found   Next office visit with prescribing clinician: 4/24/2024                         Would you like a call back once the refill request has been completed: [] Yes [] No    If the office needs to give you a call back, can they leave a voicemail: [] Yes [] No    Halie Maldonado MA  03/01/24, 09:03 EST

## 2024-05-15 ENCOUNTER — TELEPHONE (OUTPATIENT)
Dept: ENDOCRINOLOGY | Facility: CLINIC | Age: 54
End: 2024-05-15

## 2024-05-15 NOTE — TELEPHONE ENCOUNTER
Please call patient and let her know that I have seen her last lab results - her thyroid function is low. Is she taking her 200 mcg Levoxyl consistently? Fid she skip any doses recently? If she didn't skip any doses I would send her a higher dose- increase to 225 mcg daily.     Spoke to pt - she has missed several doses of thyroid med.   She wants to know why her t3 is abnormal and antibodies are high?

## 2024-05-16 NOTE — TELEPHONE ENCOUNTER
If she skipped some doses of the thyroid medication then we can continue the same dose and be more consistent. Thyroid antibodies are elevated because of autoimmune thyroid disease (hashimoto thyroiditis). TSh is high and T3 is low because your thyroid function is low. We may need increase the dose. Please repeat labs 6 weeks after taking medication consistently. If the levels are low we would increase the dose.

## 2024-05-16 NOTE — TELEPHONE ENCOUNTER
Spoke with pt who states she is agreeable to maintain medication regimen and repeat labs in 6 weeks. Pt was wondering if she needs to reschedule upcoming appt 6/4/24 until he lab work date.     Pt also notes she has been told that she has Hashimoto's but has not had this diagnosed on record. Pt verbalizes concerns with this as it may be interfering with her ability to obtain disability benefits. Pt would like clarification on if this is a chronic condition noted by her previous lab work and elevated antibody levels.

## 2024-06-25 ENCOUNTER — OFFICE VISIT (OUTPATIENT)
Dept: ENDOCRINOLOGY | Facility: CLINIC | Age: 54
End: 2024-06-25
Payer: COMMERCIAL

## 2024-06-25 VITALS
WEIGHT: 162 LBS | SYSTOLIC BLOOD PRESSURE: 110 MMHG | DIASTOLIC BLOOD PRESSURE: 60 MMHG | HEART RATE: 88 BPM | BODY MASS INDEX: 27.66 KG/M2 | HEIGHT: 64 IN

## 2024-06-25 DIAGNOSIS — E06.3 HYPOTHYROIDISM DUE TO HASHIMOTO'S THYROIDITIS: ICD-10-CM

## 2024-06-25 DIAGNOSIS — E03.8 HYPOTHYROIDISM DUE TO HASHIMOTO'S THYROIDITIS: ICD-10-CM

## 2024-06-25 DIAGNOSIS — Z79.4 TYPE 2 DIABETES MELLITUS WITH DIABETIC POLYNEUROPATHY, WITH LONG-TERM CURRENT USE OF INSULIN: Primary | ICD-10-CM

## 2024-06-25 DIAGNOSIS — E11.42 TYPE 2 DIABETES MELLITUS WITH DIABETIC POLYNEUROPATHY, WITH LONG-TERM CURRENT USE OF INSULIN: Primary | ICD-10-CM

## 2024-06-25 LAB
EXPIRATION DATE: ABNORMAL
EXPIRATION DATE: NORMAL
GLUCOSE BLDC GLUCOMTR-MCNC: 218 MG/DL (ref 70–130)
HBA1C MFR BLD: 5.4 % (ref 4.5–5.7)
HBA1C MFR BLD: 5.4 % (ref 4.8–5.6)
Lab: ABNORMAL
Lab: NORMAL
T3 SERPL-MCNC: 261 NG/DL (ref 80–200)
T4 FREE SERPL-MCNC: 4.61 NG/DL (ref 0.92–1.68)
TSH SERPL DL<=0.05 MIU/L-ACNC: 0.04 UIU/ML (ref 0.27–4.2)

## 2024-06-25 PROCEDURE — 83036 HEMOGLOBIN GLYCOSYLATED A1C: CPT | Performed by: INTERNAL MEDICINE

## 2024-06-25 PROCEDURE — 84439 ASSAY OF FREE THYROXINE: CPT | Performed by: INTERNAL MEDICINE

## 2024-06-25 PROCEDURE — 84443 ASSAY THYROID STIM HORMONE: CPT | Performed by: INTERNAL MEDICINE

## 2024-06-25 PROCEDURE — 82947 ASSAY GLUCOSE BLOOD QUANT: CPT | Performed by: INTERNAL MEDICINE

## 2024-06-25 PROCEDURE — 84480 ASSAY TRIIODOTHYRONINE (T3): CPT | Performed by: INTERNAL MEDICINE

## 2024-06-25 PROCEDURE — 99214 OFFICE O/P EST MOD 30 MIN: CPT | Performed by: INTERNAL MEDICINE

## 2024-06-25 RX ORDER — THYROID 120 MG/1
120 TABLET ORAL DAILY
Qty: 30 TABLET | Refills: 11 | Status: SHIPPED | OUTPATIENT
Start: 2024-06-25 | End: 2024-06-28

## 2024-06-25 NOTE — PROGRESS NOTES
"Chief complaint  Diabetes    Subjective   Ann Galdamez is a 53 y.o. female is here today for follow-up.  Follow-up for DM type 2 dx 2012. After gastric sleeve surgery she has lost 35 lbs and came off all her diabetes medications, including insulin, victoza, metformin.   She is not taking Ozempic or Mounjaro for 6 months because of abdominal pain. She has episode of abdominal pain and vomiting, several visits to the ER with GI issues. The symptoms persisted after she stopped medications.      Hypothyroidism dx 2006. Levothyroxine resulted in dose variability due to poor absorption and we have transitioned to Tirosint 200 mcg daily. Later switched to Levoxyl 200 mcg due to coverage issues. She was changed to levothyroxine and the labs were abnormal in 5/2024: TSH 30.     C/o hot flushes, fatigue, GI symptoms, insomnia.   GI specialist recommended endoscopy, but insurance didn't cover it.       Medications    Current Outpatient Medications:     cloNIDine (CATAPRES) 0.1 MG tablet, TAKE 1 TABLET EVERY NIGHT, Disp: 90 tablet, Rfl: 3    CRANBERRY PO, Take 1 capsule by mouth daily., Disp: , Rfl:     cyanocobalamin 1000 MCG/ML injection, INJECT 1 ML UNDER THE SKIN INTO THE APPROPRIATE AREA AS DIRECTED EVERY 28 DAYS, Disp: 3 mL, Rfl: 3    diclofenac (VOLTAREN) 75 MG EC tablet, TAKE ONE TABLET BY MOUTH EVERY TWELVE HOURS --TAKE WITH FOOD--, Disp: , Rfl:     DULoxetine (CYMBALTA) 60 MG capsule, , Disp: , Rfl:     HYDROcodone-acetaminophen (NORCO) 5-325 MG per tablet, Take 1 tablet by mouth Every 6 (Six) Hours As Needed for Moderate Pain., Disp: , Rfl:     oxybutynin (DITROPAN) 5 MG tablet, Take 1 tablet by mouth 2 (Two) Times a Day., Disp: 60 tablet, Rfl: 2    pravastatin (PRAVACHOL) 20 MG tablet, Daily., Disp: , Rfl:     pregabalin (LYRICA) 300 MG capsule, TAKE 1 CAPSULE BY MOUTH TWICE DAILY, Disp: 60 capsule, Rfl: 5    Syringe 23G X 1\" 3 ML misc, 3 each Every 28 (Twenty-Eight) Days. Syringe for B12 vitamin administration, " "Disp: 3 each, Rfl: 3    Syringe/Needle, Disp, (B-D 3CC LUER-JORDY SYR 25GX1\") 25G X 1\" 3 ML misc, USE ONCE MONTHLY, Disp: 3 each, Rfl: 3    traZODone (DESYREL) 50 MG tablet, TAKE 1 TABLET AT NIGHT AS NEEDED FOR SLEEP, Disp: 90 tablet, Rfl: 1    vitamin D (ERGOCALCIFEROL) 1.25 MG (50146 UT) capsule capsule, Take 1 capsule by mouth Every 7 (Seven) Days., Disp: 12 capsule, Rfl: 1    Thyroid 120 MG PO tablet, Take 1 tablet by mouth Daily., Disp: 30 tablet, Rfl: 11      Review of systems  Review of Systems   Genitourinary:  Positive for urgency.       Physical exam  Objective   Blood pressure 110/60, pulse 88, height 162.6 cm (64.02\"), weight 73.5 kg (162 lb). Body mass index is 27.79 kg/m².   Physical Exam   Constitutional: She is oriented to person, place, and time. She appears well-developed.   HENT:   Head: Normocephalic and atraumatic.   Eyes: Conjunctivae are normal.   Neck: No thyromegaly present.   The neck is supple and symmetric   Cardiovascular: Normal rate, regular rhythm, normal heart sounds and normal pulses.   Pulmonary/Chest: Effort normal and breath sounds normal.   Musculoskeletal: Normal range of motion.   Neurological: She is alert and oriented to person, place, and time. She has normal reflexes.   Psychiatric: Thought content normal.   Vitals reviewed.      Results for orders placed or performed in visit on 06/25/24   POC Glucose, Blood    Specimen: Blood   Result Value Ref Range    Glucose 218 (A) 70 - 130 mg/dL    Lot Number [id number]     Expiration Date 10/05/2024    POC Glycosylated Hemoglobin (Hb A1C)    Specimen: Blood   Result Value Ref Range    Hemoglobin A1C 5.4 4.5 - 5.7 %    Lot Number [id number]     Expiration Date 10/23/2023      Lab Results   Component Value Date    HGBA1C 5.4 06/25/2024    HGBA1C 5.6 10/18/2023    HGBA1C 6.1 05/09/2023     Lab Results   Component Value Date    MICROALBUR 5.0 05/22/2018    CREATININE 1.44 (H) 10/18/2023       Assessment  Assessment & Plan   Problems " Addressed this Visit          Endocrine and Metabolic    Diabetes mellitus with diabetic polyneuropathy, with long-term current use of insulin - Primary    Relevant Orders    POC Glucose, Blood (Completed)    POC Glycosylated Hemoglobin (Hb A1C) (Completed)    Hemoglobin A1c    Hypothyroidism due to Hashimoto's thyroiditis    Relevant Medications    Thyroid 120 MG PO tablet    Other Relevant Orders    TSH    T4, Free    T3     Diagnoses         Codes Comments    Type 2 diabetes mellitus with diabetic polyneuropathy, with long-term current use of insulin    -  Primary ICD-10-CM: E11.42, Z79.4  ICD-9-CM: 250.60, 357.2, V58.67     Hypothyroidism due to Hashimoto's thyroiditis     ICD-10-CM: E03.8, E06.3  ICD-9-CM: 244.8, 245.2             Plan  Hypothyroidism, uncontrolled after change to levothyroxine. TSH was 30 on 200 mcg levothyroxine. In the past her levels were stable on Levoxyl or Tirosint brand.   We have reviewed different brands and trial of [place]/NP thyroid is recommended. I have given her a sample of 120 mg NP thyroid and we will recheck labs in 6 weeks.     -repeat TFT and adjust the dose.     2. Diabetes mellitus type 2   -Continue with diet. Discussed dietary modifications.   Continue to follow-up with GI specialist.     Follow-up in 4 months

## 2024-06-26 ENCOUNTER — PRIOR AUTHORIZATION (OUTPATIENT)
Dept: ENDOCRINOLOGY | Facility: CLINIC | Age: 54
End: 2024-06-26
Payer: COMMERCIAL

## 2024-06-26 NOTE — TELEPHONE ENCOUNTER
LUX CAMPBELL Key: [id number] - PA Case ID: [id number] - Rx #: [id number] help? Call us at [phone]  Status  Sent to [facility]  Drug  Niva Thyroid 120MG tablets  Form  [employer] Electronic PA Form (2017 NCPDP)  Original Claim Info  74 ?MUST USE LEVOTHYROXINE, LIOTHYRONINE, SYNTHROID OR MED NEC PA ONLY [id number] REQUIRES PRIOR AUTHORIZATION(PHARMACY HELP DESK [phone]) For RxLocal Coupon Price of: $51.16 submit to BIN: [id number] PCN: DEE Group: COUPON --Service provided at no cost and no switch fee to the pharmacy--

## 2024-06-28 RX ORDER — THYROID 90 MG/1
90 TABLET ORAL DAILY
Qty: 30 TABLET | Refills: 11 | Status: SHIPPED | OUTPATIENT
Start: 2024-06-28

## 2025-05-23 RX ORDER — ERGOCALCIFEROL 1.25 MG/1
50000 CAPSULE, LIQUID FILLED ORAL
Qty: 12 CAPSULE | Refills: 1 | Status: SHIPPED | OUTPATIENT
Start: 2025-05-23

## 2025-05-23 NOTE — TELEPHONE ENCOUNTER
Caller: Ann Galdamez    Relationship: Self    Best call back number: [phone]    Requested Prescriptions:   Requested Prescriptions     Pending Prescriptions Disp Refills    vitamin D (ERGOCALCIFEROL) 1.25 MG (55461 UT) capsule capsule 12 capsule 1     Sig: Take 1 capsule by mouth Every 7 (Seven) Days.    VITAMIN B12    Pharmacy where request should be sent:  [facility] [address] - [phone] [facility] [phone]      Last office visit with prescribing clinician: 6/25/2024   Last telemedicine visit with prescribing clinician: Visit date not found   Next office visit with prescribing clinician: Visit date not found     Additional details provided by patient: PT WANTS TO SEE ABOUT GETTING BACK ON THE OZEMPIC    Does the patient have less than a 3 day supply:  [] Yes  [] No    Would you like a call back once the refill request has been completed: [] Yes [] No    If the office needs to give you a call back, can they leave a voicemail: [] Yes [] No    Vinita Smalls Rep   05/23/25 14:15 EDT

## 2025-08-01 RX ORDER — THYROID, PORCINE 120 MG/1
120 TABLET ORAL DAILY
Qty: 30 TABLET | Refills: 0 | Status: SHIPPED | OUTPATIENT
Start: 2025-08-01 | End: 2025-08-01 | Stop reason: SDUPTHER

## 2025-08-01 RX ORDER — THYROID 120 MG/1
120 TABLET ORAL DAILY
Qty: 30 TABLET | Refills: 0 | Status: SHIPPED | OUTPATIENT
Start: 2025-08-01

## 2025-08-01 NOTE — TELEPHONE ENCOUNTER
Rx Refill Note  Requested Prescriptions     Pending Prescriptions Disp Refills    Niva Thyroid 120 MG tablet [Pharmacy Med Name: Niva Thyroid 120 mg tablet] 30 tablet 11     Sig: TAKE ONE TABLET BY MOUTH EVERY DAY      Last office visit with prescribing clinician: 6/25/2024   Last telemedicine visit with prescribing clinician: Visit date not found   Next office visit with prescribing clinician: Visit date not found                         Would you like a call back once the refill request has been completed: [] Yes [] No    If the office needs to give you a call back, can they leave a voicemail: [] Yes [] No    Marilee Singleton MA  08/01/25, 09:26 EDT

## 2025-08-04 RX ORDER — THYROID, PORCINE 120 MG/1
120 TABLET ORAL DAILY
Qty: 30 TABLET | Refills: 11 | OUTPATIENT
Start: 2025-08-04

## 2025-08-05 RX ORDER — THYROID 120 MG/1
120 TABLET ORAL DAILY
Qty: 30 TABLET | Refills: 3 | Status: SHIPPED | OUTPATIENT
Start: 2025-08-05

## (undated) DEVICE — ADHS SKIN PREMIERPRO EXOFIN TOPICAL HI/VISC .5ML

## (undated) DEVICE — SHEET, DRAPE, SPLIT, STERILE: Brand: MEDLINE

## (undated) DEVICE — APPL HEMOS FOR DELIVERY FLOSEAL

## (undated) DEVICE — MANIP UTER RUMI TP 6.7MMX8CM BLU

## (undated) DEVICE — COLUMN DRAPE

## (undated) DEVICE — TIP COVER ACCESSORY

## (undated) DEVICE — ARM DRAPE

## (undated) DEVICE — ANTIBACTERIAL UNDYED BRAIDED (POLYGLACTIN 910), SYNTHETIC ABSORBABLE SURGICAL SUTURE: Brand: COATED VICRYL

## (undated) DEVICE — TRENDELENBURG WINGPAD POSITIONING KIT DELUXE - WITHOUT BODY STRAP: Brand: SOULE MEDICAL

## (undated) DEVICE — SUT MNCRYL PLS ANTIB UD 3/0 PS2 27IN

## (undated) DEVICE — BLADELESS OBTURATOR: Brand: WECK VISTA

## (undated) DEVICE — GLV SURG SENSICARE W/ALOE PF LF 6.5 STRL

## (undated) DEVICE — CANNULA SEAL

## (undated) DEVICE — ENDOPATH PNEUMONEEDLE INSUFFLATION NEEDLES WITH LUER LOCK CONNECTORS 120MM: Brand: ENDOPATH

## (undated) DEVICE — PATIENT RETURN ELECTRODE, SINGLE-USE, CONTACT QUALITY MONITORING, ADULT, WITH 9FT CORD, FOR PATIENTS WEIGING OVER 33LBS. (15KG): Brand: MEGADYNE

## (undated) DEVICE — ANCHOR TISSUE RETRIEVAL SYSTEM, BAG SIZE 125 ML, PORT SIZE 8 MM: Brand: ANCHOR TISSUE RETRIEVAL SYSTEM

## (undated) DEVICE — APPL CHLORAPREP TINTED 26ML TEAL

## (undated) DEVICE — BLANKT WARM UPPR/BDY ARM/OUT 57X196CM

## (undated) DEVICE — PK MAJ GYN DAVINCI 10

## (undated) DEVICE — MANIP UTER RUMI 2 KOH EFFICIENT 3CM BL

## (undated) DEVICE — ANTIBACTERIAL UNDYED BRAIDED (POLYGLACTIN 910), SYNTHETIC ABSORBABLE SUTURE: Brand: COATED VICRYL

## (undated) DEVICE — SYR LUERLOK 5CC

## (undated) DEVICE — UNDERGLV SURG BIOGEL INDICAT PF 61/2 GRN

## (undated) DEVICE — ST TBG CONN PNEUMOCLEAR EVAC SMOKE HEAT/HUMID